# Patient Record
Sex: FEMALE | Race: WHITE | NOT HISPANIC OR LATINO | Employment: FULL TIME | URBAN - METROPOLITAN AREA
[De-identification: names, ages, dates, MRNs, and addresses within clinical notes are randomized per-mention and may not be internally consistent; named-entity substitution may affect disease eponyms.]

---

## 2018-07-11 ENCOUNTER — NON-PROVIDER VISIT (OUTPATIENT)
Dept: OCCUPATIONAL MEDICINE | Facility: CLINIC | Age: 43
End: 2018-07-11

## 2018-07-12 ENCOUNTER — NON-PROVIDER VISIT (OUTPATIENT)
Dept: OCCUPATIONAL MEDICINE | Facility: CLINIC | Age: 43
End: 2018-07-12

## 2018-07-12 DIAGNOSIS — Z02.1 DRUG SCREENING, PRE-EMPLOYMENT: ICD-10-CM

## 2018-07-12 PROCEDURE — 8907 PR URINE COLLECT ONLY: Performed by: PREVENTIVE MEDICINE

## 2018-11-01 ENCOUNTER — OFFICE VISIT (OUTPATIENT)
Dept: OTHER | Facility: IMAGING CENTER | Age: 43
End: 2018-11-01
Payer: COMMERCIAL

## 2018-11-01 VITALS
BODY MASS INDEX: 26.98 KG/M2 | RESPIRATION RATE: 14 BRPM | WEIGHT: 178 LBS | HEART RATE: 85 BPM | OXYGEN SATURATION: 100 % | HEIGHT: 68 IN | SYSTOLIC BLOOD PRESSURE: 110 MMHG | TEMPERATURE: 98.2 F | DIASTOLIC BLOOD PRESSURE: 70 MMHG

## 2018-11-01 DIAGNOSIS — F41.9 ANXIETY: ICD-10-CM

## 2018-11-01 DIAGNOSIS — K44.9 HIATAL HERNIA WITH GERD: ICD-10-CM

## 2018-11-01 DIAGNOSIS — K21.9 HIATAL HERNIA WITH GERD: ICD-10-CM

## 2018-11-01 DIAGNOSIS — Z00.00 PREVENTATIVE HEALTH CARE: ICD-10-CM

## 2018-11-01 DIAGNOSIS — Z23 NEEDS FLU SHOT: ICD-10-CM

## 2018-11-01 DIAGNOSIS — F17.200: ICD-10-CM

## 2018-11-01 PROCEDURE — 90686 IIV4 VACC NO PRSV 0.5 ML IM: CPT | Performed by: FAMILY MEDICINE

## 2018-11-01 PROCEDURE — 99204 OFFICE O/P NEW MOD 45 MIN: CPT | Performed by: FAMILY MEDICINE

## 2018-11-01 PROCEDURE — 90471 IMMUNIZATION ADMIN: CPT | Performed by: FAMILY MEDICINE

## 2018-11-01 RX ORDER — OMEPRAZOLE 20 MG/1
20 CAPSULE, DELAYED RELEASE ORAL DAILY
Status: ON HOLD | COMMUNITY
End: 2019-01-20

## 2018-11-01 ASSESSMENT — PATIENT HEALTH QUESTIONNAIRE - PHQ9: CLINICAL INTERPRETATION OF PHQ2 SCORE: 0

## 2018-11-01 NOTE — LETTER
Anonymess  Terrell Sanders D.O.  [address]  [place] [address]  Fax: [phone]   Authorization for Release/Disclosure of   Protected Health Information   Name: DAKSHA SENIOR : 1975 SSN: [id number]   Address: [address] [place] [address] [place] [address] Phone:    [phone] (home)    I authorize the entity listed below to release/disclose the PHI below to:   [facility]/Terrell Sanders D.O. and Terrell Sanders D.O.   Provider or Entity Name:     Address   City, State, Zip   Phone:      Fax:     Reason for request: continuity of care   Information to be released:    [  ] LAST COLONOSCOPY,  including any PATH REPORT and follow-up  [  ] LAST FIT/COLOGUARD RESULT [  ] LAST DEXA  [  ] LAST MAMMOGRAM  [  ] LAST PAP  [  ] LAST LABS [  ] RETINA EXAM REPORT  [  ] IMMUNIZATION RECORDS  [  ] Release all info      [  ] Check here and initial the line next to each item to release ALL health information INCLUDING  _____ Care and treatment for drug and / or alcohol abuse  _____ HIV testing, infection status, or AIDS  _____ Genetic Testing    DATES OF SERVICE OR TIME PERIOD TO BE DISCLOSED: _____________  I understand and acknowledge that:  * This Authorization may be revoked at any time by you in writing, except if your health information has already been used or disclosed.  * Your health information that will be used or disclosed as a result of you signing this authorization could be re-disclosed by the recipient. If this occurs, your re-disclosed health information may no longer be protected by State or Federal laws.  * You may refuse to sign this Authorization. Your refusal will not affect your ability to obtain treatment.  * This Authorization becomes effective upon signing and will  on (date) __________.      If no date is indicated, this Authorization will  one (1) year from the signature date.    Name: Daksha Senior    Signature:   Date:     2018       PLEASE FAX REQUESTED  RECORDS BACK TO: [phone]

## 2018-11-01 NOTE — PROGRESS NOTES
Chief Complaint   Patient presents with   • Gastrophageal Reflux     Daksha Senior is a 43 y.o. female who usually sees out of the area PCP presents today to establish care at [facility] and to discuss GERD.    HPI:  Anxiety  Increased her smoking with her increase in work stress and although she does not feel the panic level of her anxiety.  She moved to the area with the new job 6 months ago but has been very stressed with her new environment in the first 3 months.  Patient denies hyperventilating, no dyspnea, nor reduced work functionality during the peak anxiety.    Hiatal hernia with GERD  Patient had chronic issues with hiatal hernia but has not had feeling of reflux until her status post gastric sleeve 5 years ago.  She has been placed on omeprazole since then and has been fairly consistent except maybe for 1 day out of the week.  And since that patient would have the whole day of needing juan for about 4-5 pills in the 24 hours.  Patient described a sensation being burning in the epigastric area without pain or significant dyspnea.  Prior to moving team to the area patient had regular checkup forward the gastric sleeve and the EGD done in the last 5-month.    Preventative health care  Patient had family history of colon cancer and had an ovary colonoscopy done at age 40 with no reports of polyp during that time.    Depression Screening    Little interest or pleasure in doing things?  0 - not at all  Feeling down, depressed , or hopeless? 0 - not at all  Patient Health Questionnaire Score: 0    If depressive symptoms identified deferred to follow up visit unless specifically addressed in assesment and plan.      Interpretation of PHQ-9 Total Score   Score Severity   1-4 Minimal Depression   5-9 Mild Depression   10-14 Moderate Depression   15-19 Moderately Severe Depression   20-27 Severe Depression       Current medicines (including changes today)  Current Outpatient Prescriptions   Medication Sig Dispense  Refill   • B Complex Vitamins (VITAMIN-B COMPLEX PO) Take  by mouth.     • Multiple Vitamins-Minerals (MULTIVITAMIN ADULTS PO) Take  by mouth.     • omeprazole (PRILOSEC) 20 MG delayed-release capsule Take 20 mg by mouth every day.       No current facility-administered medications for this visit.      She  has no past medical history on file.  She  has no past surgical history on file.  Social History   Substance Use Topics   • Smoking status: Light Tobacco Smoker     Types: Cigarettes   • Smokeless tobacco: Never Used      Comment: socially   • Alcohol use 2.4 - 4.8 oz/week     2 - 4 Glasses of wine, 2 - 4 Cans of beer per week     Family History   Problem Relation Age of Onset   • Cancer Father      Family Status   Relation Status   • Fa (Not Specified)     Social History     Social History Narrative   • No narrative on file     ROS  Constitutional: Negative for fever, chills, weight loss and malaise/fatigue.   HENT: Negative for ear pain, nosebleeds, congestion, sore throat and neck pain.    Eyes: Negative for blurred vision.   Respiratory: Negative for cough, sputum production, shortness of breath and wheezing.    Cardiovascular: Negative for chest pain, palpitations, orthopnea and leg swelling.   Gastrointestinal: Negative for nausea, vomiting and abdominal pain. Positive for heartburn  Genitourinary: Negative for dysuria, urgency and frequency.   Musculoskeletal: Negative for myalgias, back pain and joint pain.   Skin: Negative for rash and itching.   Neurological: Negative for dizziness, tingling, tremors, sensory change, focal weakness and headaches.   Endo/Heme/Allergies: Does not bruise/bleed easily.   Psychiatric/Behavioral: Negative for depression, or memory loss.   Positive for anxiety  All other systems reviewed and are negative except as in HPI.    Labs reviewed with patient:  None reviewed, last labs done 3 years ago with normal results per patient.     Objective:   Blood pressure 110/70, pulse 85,  "temperature 36.8 °C (98.2 °F), resp. rate 14, height 1.727 m (5' 8\"), weight 80.7 kg (178 lb), SpO2 100 %. Body mass index is 27.06 kg/m².  Physical Exam:  Constitutional: Alert, no distress.  Skin: Warm, dry, good turgor, no rashes in visible areas.  Eye: Equal, round and reactive, conjunctiva clear, lids normal.  ENMT: Lips without lesions, good dentition, oropharynx clear.  Neck: Trachea midline, no masses, no thyromegaly.  Respiratory: Unlabored respiratory effort, lungs clear to auscultation, no wheezes, no ronchi.  Cardiovascular: Normal S1, S2, no murmur, no edema.  Abdomen: Soft, non-tender, no masses, no hepatosplenomegaly.  Psych: Alert and oriented x3, normal affect and mood.    Assessment and Plan:   The following treatment plan was discussed  1. Hiatal hernia with GERD  CBC WITH DIFFERENTIAL    COMP METABOLIC PANEL    TSH WITH REFLEX TO FT4    LIPID PROFILE    Continue omeprazole 20 mg daily, possible benefit from including slippery elm 400 mg twice a day with meal away from other medicatiopns intake within two hours.  Also discussed the benefit of using anti-inflammatory diet.  We will have laboratory monitoring upon the follow-up visit.   2. Anxiety      Improved in the last 3 months but still significant in that her craving for soical connection and social smoking can increase.  Discussed the possible benefit in private session acupuncture to reduce the smoking dependence and anxiety level.   3. Needs flu shot  Flu Quad Inj >3 Year Pre-Filled PF    Risks and benefit of flu vaccination discussed, patient's work place required for the flu shot evidence.  Discussed with patient to have vaccination performed today with andrographis 300 mg twice daily for the next 2 days.   4. Tobacco use disorder, mild, in controlled environment      Discussed of the barriers in smoking cessation, frequent smoke in the past and has increased in the last 3-month with the social situation of the smoking.  Encourage " patients efforts in smoking cessation in the consultation for smoking cessation over 15 minutes.   5. Preventative health care      Discussed the possble benefit in using additional garlic in cooking but combined with apple to reduce the reflux inducing potential.     Records requested.  Followup: Return in about 3 months (around 2/1/2019).    Spent 65 minutes in face-to-tace patient contact in which greater than 50% of the visit was spent in counseling and coordination of care including Anxiety management options, Answering patient questions about Smoking cessation, Concerns and potential risks related to Tobacco usage, Discussing in depth the importance of primary prevention of Hepatic failure, Discussing the nature of Tobacco cessation and Patient is also educated about lifestyle modifications which may improve Anxiety, gastroesophageal reflux disease, tobacco use disorder  We also reviewed PMH, family history, and each of her chronic diagnoses in regards to current management, specialty physicians, symptom control, and future planning.  Please refer to assessment and plan/discussion/recommendations for additional details.        I have worked with consultants from the vendor as well as technical experts from [employer] to optimize the interface. I have made every reasonable attempt to correct obvious errors, but I expect that there are errors of grammar and possibly content that I did not discover before finalizing the note.

## 2018-11-01 NOTE — ASSESSMENT & PLAN NOTE
Patient had chronic issues with hiatal hernia but has not had feeling of reflux until her status post gastric sleeve 5 years ago.  She has been placed on omeprazole since then and has been fairly consistent except maybe for 1 day out of the week.  And since that patient would have the whole day of needing juan for about 4-5 pills in the 24 hours.  Patient described a sensation being burning in the epigastric area without pain or significant dyspnea.  Prior to moving team to the area patient had regular checkup forward the gastric sleeve and the EGD done in the last 5-month.

## 2018-11-01 NOTE — ASSESSMENT & PLAN NOTE
Patient had family history of colon cancer and had an ovary colonoscopy done at age 40 with no reports of polyp during that time.

## 2018-11-01 NOTE — ASSESSMENT & PLAN NOTE
Increased her smoking with her increase in work stress and although she does not feel the panic level of her anxiety.  She moved to the area with the new job 6 months ago but has been very stressed with her new environment in the first 3 months.  Patient denies hyperventilating, no dyspnea, nor reduced work functionality during the peak anxiety.

## 2019-01-19 ENCOUNTER — HOSPITAL ENCOUNTER (OUTPATIENT)
Facility: MEDICAL CENTER | Age: 44
End: 2019-01-20
Attending: EMERGENCY MEDICINE | Admitting: HOSPITALIST
Payer: COMMERCIAL

## 2019-01-19 ENCOUNTER — APPOINTMENT (OUTPATIENT)
Dept: RADIOLOGY | Facility: MEDICAL CENTER | Age: 44
End: 2019-01-19
Payer: COMMERCIAL

## 2019-01-19 DIAGNOSIS — R07.9 ACUTE CHEST PAIN: ICD-10-CM

## 2019-01-19 LAB
ALBUMIN SERPL BCP-MCNC: 4.1 G/DL (ref 3.2–4.9)
ALBUMIN/GLOB SERPL: 1.6 G/DL
ALP SERPL-CCNC: 33 U/L (ref 30–99)
ALT SERPL-CCNC: 16 U/L (ref 2–50)
ANION GAP SERPL CALC-SCNC: 8 MMOL/L (ref 0–11.9)
AST SERPL-CCNC: 16 U/L (ref 12–45)
BASOPHILS # BLD AUTO: 0.5 % (ref 0–1.8)
BASOPHILS # BLD: 0.06 K/UL (ref 0–0.12)
BILIRUB SERPL-MCNC: 0.4 MG/DL (ref 0.1–1.5)
BUN SERPL-MCNC: 19 MG/DL (ref 8–22)
CALCIUM SERPL-MCNC: 9.4 MG/DL (ref 8.5–10.5)
CHLORIDE SERPL-SCNC: 107 MMOL/L (ref 96–112)
CO2 SERPL-SCNC: 24 MMOL/L (ref 20–33)
CREAT SERPL-MCNC: 0.81 MG/DL (ref 0.5–1.4)
EKG IMPRESSION: NORMAL
EOSINOPHIL # BLD AUTO: 0.18 K/UL (ref 0–0.51)
EOSINOPHIL NFR BLD: 1.4 % (ref 0–6.9)
ERYTHROCYTE [DISTWIDTH] IN BLOOD BY AUTOMATED COUNT: 42.9 FL (ref 35.9–50)
GLOBULIN SER CALC-MCNC: 2.6 G/DL (ref 1.9–3.5)
GLUCOSE SERPL-MCNC: 120 MG/DL (ref 65–99)
HCT VFR BLD AUTO: 35.6 % (ref 37–47)
HGB BLD-MCNC: 11.7 G/DL (ref 12–16)
IMM GRANULOCYTES # BLD AUTO: 0.03 K/UL (ref 0–0.11)
IMM GRANULOCYTES NFR BLD AUTO: 0.2 % (ref 0–0.9)
LIPASE SERPL-CCNC: 37 U/L (ref 11–82)
LYMPHOCYTES # BLD AUTO: 3.06 K/UL (ref 1–4.8)
LYMPHOCYTES NFR BLD: 24.4 % (ref 22–41)
MCH RBC QN AUTO: 28.6 PG (ref 27–33)
MCHC RBC AUTO-ENTMCNC: 32.9 G/DL (ref 33.6–35)
MCV RBC AUTO: 87 FL (ref 81.4–97.8)
MONOCYTES # BLD AUTO: 1.02 K/UL (ref 0–0.85)
MONOCYTES NFR BLD AUTO: 8.1 % (ref 0–13.4)
NEUTROPHILS # BLD AUTO: 8.19 K/UL (ref 2–7.15)
NEUTROPHILS NFR BLD: 65.4 % (ref 44–72)
NRBC # BLD AUTO: 0 K/UL
NRBC BLD-RTO: 0 /100 WBC
PLATELET # BLD AUTO: 310 K/UL (ref 164–446)
PMV BLD AUTO: 10.6 FL (ref 9–12.9)
POTASSIUM SERPL-SCNC: 3.8 MMOL/L (ref 3.6–5.5)
PROT SERPL-MCNC: 6.7 G/DL (ref 6–8.2)
RBC # BLD AUTO: 4.09 M/UL (ref 4.2–5.4)
SODIUM SERPL-SCNC: 139 MMOL/L (ref 135–145)
TROPONIN I SERPL-MCNC: <0.01 NG/ML (ref 0–0.04)
WBC # BLD AUTO: 12.5 K/UL (ref 4.8–10.8)

## 2019-01-19 PROCEDURE — 83540 ASSAY OF IRON: CPT

## 2019-01-19 PROCEDURE — 84484 ASSAY OF TROPONIN QUANT: CPT

## 2019-01-19 PROCEDURE — 80053 COMPREHEN METABOLIC PANEL: CPT

## 2019-01-19 PROCEDURE — 84443 ASSAY THYROID STIM HORMONE: CPT

## 2019-01-19 PROCEDURE — 83036 HEMOGLOBIN GLYCOSYLATED A1C: CPT

## 2019-01-19 PROCEDURE — 82728 ASSAY OF FERRITIN: CPT

## 2019-01-19 PROCEDURE — 83550 IRON BINDING TEST: CPT

## 2019-01-19 PROCEDURE — 85025 COMPLETE CBC W/AUTO DIFF WBC: CPT

## 2019-01-19 PROCEDURE — 93005 ELECTROCARDIOGRAM TRACING: CPT | Performed by: EMERGENCY MEDICINE

## 2019-01-19 PROCEDURE — 83690 ASSAY OF LIPASE: CPT

## 2019-01-19 PROCEDURE — 85652 RBC SED RATE AUTOMATED: CPT

## 2019-01-19 PROCEDURE — 85379 FIBRIN DEGRADATION QUANT: CPT

## 2019-01-19 PROCEDURE — 84703 CHORIONIC GONADOTROPIN ASSAY: CPT

## 2019-01-19 PROCEDURE — 71045 X-RAY EXAM CHEST 1 VIEW: CPT

## 2019-01-19 PROCEDURE — 36415 COLL VENOUS BLD VENIPUNCTURE: CPT

## 2019-01-19 PROCEDURE — 86140 C-REACTIVE PROTEIN: CPT

## 2019-01-19 PROCEDURE — 99285 EMERGENCY DEPT VISIT HI MDM: CPT

## 2019-01-19 PROCEDURE — 93005 ELECTROCARDIOGRAM TRACING: CPT

## 2019-01-19 RX ORDER — ASPIRIN 325 MG
325 TABLET ORAL ONCE
Status: COMPLETED | OUTPATIENT
Start: 2019-01-19 | End: 2019-01-20

## 2019-01-19 ASSESSMENT — PAIN SCALES - GENERAL: PAINLEVEL_OUTOF10: 3

## 2019-01-20 ENCOUNTER — APPOINTMENT (OUTPATIENT)
Dept: RADIOLOGY | Facility: MEDICAL CENTER | Age: 44
End: 2019-01-20
Attending: HOSPITALIST
Payer: COMMERCIAL

## 2019-01-20 ENCOUNTER — PATIENT OUTREACH (OUTPATIENT)
Dept: HEALTH INFORMATION MANAGEMENT | Facility: OTHER | Age: 44
End: 2019-01-20

## 2019-01-20 ENCOUNTER — APPOINTMENT (OUTPATIENT)
Dept: CARDIOLOGY | Facility: MEDICAL CENTER | Age: 44
End: 2019-01-20
Attending: HOSPITALIST
Payer: COMMERCIAL

## 2019-01-20 VITALS
OXYGEN SATURATION: 94 % | BODY MASS INDEX: 26.93 KG/M2 | WEIGHT: 177.69 LBS | DIASTOLIC BLOOD PRESSURE: 61 MMHG | HEART RATE: 85 BPM | RESPIRATION RATE: 17 BRPM | TEMPERATURE: 99.5 F | SYSTOLIC BLOOD PRESSURE: 134 MMHG | HEIGHT: 68 IN

## 2019-01-20 PROBLEM — D64.9 ANEMIA: Status: ACTIVE | Noted: 2019-01-20

## 2019-01-20 PROBLEM — D50.9 IRON DEFICIENCY ANEMIA: Status: ACTIVE | Noted: 2019-01-20

## 2019-01-20 PROBLEM — R07.81 PLEURITIC CHEST PAIN: Status: RESOLVED | Noted: 2019-01-20 | Resolved: 2019-01-20

## 2019-01-20 PROBLEM — R07.81 PLEURITIC CHEST PAIN: Status: ACTIVE | Noted: 2019-01-20

## 2019-01-20 LAB
CRP SERPL HS-MCNC: 0.32 MG/DL (ref 0–0.75)
D DIMER PPP IA.FEU-MCNC: <0.4 UG/ML (FEU) (ref 0–0.5)
EKG IMPRESSION: NORMAL
ERYTHROCYTE [SEDIMENTATION RATE] IN BLOOD BY WESTERGREN METHOD: 7 MM/HOUR (ref 0–20)
EST. AVERAGE GLUCOSE BLD GHB EST-MCNC: 114 MG/DL
FERRITIN SERPL-MCNC: 7.5 NG/ML (ref 10–291)
HBA1C MFR BLD: 5.6 % (ref 0–5.6)
HCG SERPL QL: NEGATIVE
IRON SATN MFR SERPL: 10 % (ref 15–55)
IRON SERPL-MCNC: 44 UG/DL (ref 40–170)
LV EJECT FRACT  99904: 65
LV EJECT FRACT MOD 2C 99903: 67.09
LV EJECT FRACT MOD 4C 99902: 60.34
LV EJECT FRACT MOD BP 99901: 63.46
TIBC SERPL-MCNC: 427 UG/DL (ref 250–450)
TROPONIN I SERPL-MCNC: <0.01 NG/ML (ref 0–0.04)
TROPONIN I SERPL-MCNC: <0.01 NG/ML (ref 0–0.04)
TSH SERPL DL<=0.005 MIU/L-ACNC: 1.82 UIU/ML (ref 0.38–5.33)

## 2019-01-20 PROCEDURE — 700102 HCHG RX REV CODE 250 W/ 637 OVERRIDE(OP): Performed by: HOSPITALIST

## 2019-01-20 PROCEDURE — A9270 NON-COVERED ITEM OR SERVICE: HCPCS | Performed by: HOSPITALIST

## 2019-01-20 PROCEDURE — 93010 ELECTROCARDIOGRAM REPORT: CPT | Performed by: INTERNAL MEDICINE

## 2019-01-20 PROCEDURE — G0378 HOSPITAL OBSERVATION PER HR: HCPCS

## 2019-01-20 PROCEDURE — 93005 ELECTROCARDIOGRAM TRACING: CPT | Performed by: HOSPITALIST

## 2019-01-20 PROCEDURE — A9502 TC99M TETROFOSMIN: HCPCS

## 2019-01-20 PROCEDURE — 93306 TTE W/DOPPLER COMPLETE: CPT

## 2019-01-20 PROCEDURE — 99236 HOSP IP/OBS SAME DATE HI 85: CPT | Performed by: HOSPITALIST

## 2019-01-20 PROCEDURE — 700111 HCHG RX REV CODE 636 W/ 250 OVERRIDE (IP)

## 2019-01-20 PROCEDURE — 84484 ASSAY OF TROPONIN QUANT: CPT

## 2019-01-20 PROCEDURE — 96372 THER/PROPH/DIAG INJ SC/IM: CPT

## 2019-01-20 PROCEDURE — A9270 NON-COVERED ITEM OR SERVICE: HCPCS | Performed by: STUDENT IN AN ORGANIZED HEALTH CARE EDUCATION/TRAINING PROGRAM

## 2019-01-20 PROCEDURE — 90471 IMMUNIZATION ADMIN: CPT

## 2019-01-20 PROCEDURE — 90732 PPSV23 VACC 2 YRS+ SUBQ/IM: CPT | Performed by: HOSPITALIST

## 2019-01-20 PROCEDURE — 700102 HCHG RX REV CODE 250 W/ 637 OVERRIDE(OP): Performed by: STUDENT IN AN ORGANIZED HEALTH CARE EDUCATION/TRAINING PROGRAM

## 2019-01-20 PROCEDURE — 700111 HCHG RX REV CODE 636 W/ 250 OVERRIDE (IP): Performed by: HOSPITALIST

## 2019-01-20 PROCEDURE — 36415 COLL VENOUS BLD VENIPUNCTURE: CPT

## 2019-01-20 PROCEDURE — 93306 TTE W/DOPPLER COMPLETE: CPT | Mod: 26 | Performed by: INTERNAL MEDICINE

## 2019-01-20 RX ORDER — ONDANSETRON 4 MG/1
4 TABLET, ORALLY DISINTEGRATING ORAL EVERY 4 HOURS PRN
Status: DISCONTINUED | OUTPATIENT
Start: 2019-01-20 | End: 2019-01-20 | Stop reason: HOSPADM

## 2019-01-20 RX ORDER — HYDROMORPHONE HYDROCHLORIDE 1 MG/ML
0.5 INJECTION, SOLUTION INTRAMUSCULAR; INTRAVENOUS; SUBCUTANEOUS
Status: DISCONTINUED | OUTPATIENT
Start: 2019-01-20 | End: 2019-01-20 | Stop reason: HOSPADM

## 2019-01-20 RX ORDER — OMEPRAZOLE 20 MG/1
20 CAPSULE, DELAYED RELEASE ORAL DAILY
Status: DISCONTINUED | OUTPATIENT
Start: 2019-01-20 | End: 2019-01-20 | Stop reason: HOSPADM

## 2019-01-20 RX ORDER — ONDANSETRON 2 MG/ML
4 INJECTION INTRAMUSCULAR; INTRAVENOUS EVERY 4 HOURS PRN
Status: DISCONTINUED | OUTPATIENT
Start: 2019-01-20 | End: 2019-01-20 | Stop reason: HOSPADM

## 2019-01-20 RX ORDER — ASPIRIN 325 MG
325 TABLET ORAL DAILY
Status: DISCONTINUED | OUTPATIENT
Start: 2019-01-20 | End: 2019-01-20 | Stop reason: HOSPADM

## 2019-01-20 RX ORDER — SUCRALFATE ORAL 1 G/10ML
1 SUSPENSION ORAL 4 TIMES DAILY
Qty: 560 ML | Refills: 0 | Status: SHIPPED | OUTPATIENT
Start: 2019-01-20 | End: 2019-02-22

## 2019-01-20 RX ORDER — OXYCODONE HYDROCHLORIDE 10 MG/1
10 TABLET ORAL
Status: DISCONTINUED | OUTPATIENT
Start: 2019-01-20 | End: 2019-01-20 | Stop reason: HOSPADM

## 2019-01-20 RX ORDER — PROMETHAZINE HYDROCHLORIDE 25 MG/1
12.5-25 SUPPOSITORY RECTAL EVERY 4 HOURS PRN
Status: DISCONTINUED | OUTPATIENT
Start: 2019-01-20 | End: 2019-01-20 | Stop reason: HOSPADM

## 2019-01-20 RX ORDER — AMOXICILLIN 250 MG
2 CAPSULE ORAL 2 TIMES DAILY
Status: DISCONTINUED | OUTPATIENT
Start: 2019-01-20 | End: 2019-01-20 | Stop reason: HOSPADM

## 2019-01-20 RX ORDER — BISACODYL 10 MG
10 SUPPOSITORY, RECTAL RECTAL
Status: DISCONTINUED | OUTPATIENT
Start: 2019-01-20 | End: 2019-01-20 | Stop reason: HOSPADM

## 2019-01-20 RX ORDER — OXYCODONE HYDROCHLORIDE 5 MG/1
5 TABLET ORAL
Status: DISCONTINUED | OUTPATIENT
Start: 2019-01-20 | End: 2019-01-20 | Stop reason: HOSPADM

## 2019-01-20 RX ORDER — HEPARIN SODIUM 5000 [USP'U]/ML
5000 INJECTION, SOLUTION INTRAVENOUS; SUBCUTANEOUS EVERY 8 HOURS
Status: DISCONTINUED | OUTPATIENT
Start: 2019-01-20 | End: 2019-01-20 | Stop reason: HOSPADM

## 2019-01-20 RX ORDER — LANOLIN ALCOHOL/MO/W.PET/CERES
325 CREAM (GRAM) TOPICAL 2 TIMES DAILY WITH MEALS
Qty: 60 TAB | Refills: 3 | Status: SHIPPED | OUTPATIENT
Start: 2019-01-20 | End: 2019-03-19 | Stop reason: CLARIF

## 2019-01-20 RX ORDER — POLYETHYLENE GLYCOL 3350 17 G/17G
1 POWDER, FOR SOLUTION ORAL
Status: DISCONTINUED | OUTPATIENT
Start: 2019-01-20 | End: 2019-01-20 | Stop reason: HOSPADM

## 2019-01-20 RX ORDER — REGADENOSON 0.08 MG/ML
INJECTION, SOLUTION INTRAVENOUS
Status: COMPLETED
Start: 2019-01-20 | End: 2019-01-20

## 2019-01-20 RX ORDER — ASPIRIN 300 MG/1
300 SUPPOSITORY RECTAL DAILY
Status: DISCONTINUED | OUTPATIENT
Start: 2019-01-20 | End: 2019-01-20 | Stop reason: HOSPADM

## 2019-01-20 RX ORDER — ASPIRIN 81 MG/1
324 TABLET, CHEWABLE ORAL DAILY
Status: DISCONTINUED | OUTPATIENT
Start: 2019-01-20 | End: 2019-01-20 | Stop reason: HOSPADM

## 2019-01-20 RX ORDER — PROMETHAZINE HYDROCHLORIDE 25 MG/1
12.5-25 TABLET ORAL EVERY 4 HOURS PRN
Status: DISCONTINUED | OUTPATIENT
Start: 2019-01-20 | End: 2019-01-20 | Stop reason: HOSPADM

## 2019-01-20 RX ORDER — OMEPRAZOLE 20 MG/1
20 CAPSULE, DELAYED RELEASE ORAL 2 TIMES DAILY
Qty: 60 CAP | Refills: 1 | Status: SHIPPED | OUTPATIENT
Start: 2019-01-20 | End: 2019-03-19 | Stop reason: CLARIF

## 2019-01-20 RX ADMIN — HEPARIN SODIUM 5000 UNITS: 5000 INJECTION, SOLUTION INTRAVENOUS; SUBCUTANEOUS at 02:22

## 2019-01-20 RX ADMIN — LIDOCAINE HYDROCHLORIDE 15 ML: 20 SOLUTION OROPHARYNGEAL at 01:57

## 2019-01-20 RX ADMIN — ASPIRIN 325 MG: 325 TABLET, FILM COATED ORAL at 00:05

## 2019-01-20 RX ADMIN — ASPIRIN 325 MG: 325 TABLET ORAL at 05:32

## 2019-01-20 RX ADMIN — PNEUMOCOCCAL VACCINE POLYVALENT 25 MCG
25; 25; 25; 25; 25; 25; 25; 25; 25; 25; 25; 25; 25; 25; 25; 25; 25; 25; 25; 25; 25; 25; 25 INJECTION, SOLUTION INTRAMUSCULAR; SUBCUTANEOUS at 12:23

## 2019-01-20 RX ADMIN — REGADENOSON 0.4 MG: 0.08 INJECTION, SOLUTION INTRAVENOUS at 10:09

## 2019-01-20 RX ADMIN — OMEPRAZOLE 20 MG: 20 CAPSULE, DELAYED RELEASE ORAL at 05:32

## 2019-01-20 ASSESSMENT — ENCOUNTER SYMPTOMS
NAUSEA: 0
BRUISES/BLEEDS EASILY: 0
DOUBLE VISION: 0
HALLUCINATIONS: 0
WEAKNESS: 0
PALPITATIONS: 0
DEPRESSION: 0
DIZZINESS: 0
SENSORY CHANGE: 0
HEMOPTYSIS: 0
EYE DISCHARGE: 0
FEVER: 0
DIAPHORESIS: 1
CHILLS: 0
ABDOMINAL PAIN: 0
HEARTBURN: 0
FOCAL WEAKNESS: 0
VOMITING: 0
COUGH: 0
FLANK PAIN: 0
SPEECH CHANGE: 0
SHORTNESS OF BREATH: 1
BLURRED VISION: 0
MYALGIAS: 0

## 2019-01-20 ASSESSMENT — PATIENT HEALTH QUESTIONNAIRE - PHQ9
SUM OF ALL RESPONSES TO PHQ9 QUESTIONS 1 AND 2: 0
2. FEELING DOWN, DEPRESSED, IRRITABLE, OR HOPELESS: NOT AT ALL
1. LITTLE INTEREST OR PLEASURE IN DOING THINGS: NOT AT ALL

## 2019-01-20 ASSESSMENT — LIFESTYLE VARIABLES
SUBSTANCE_ABUSE: 0
EVER_SMOKED: YES
ALCOHOL_USE: NO

## 2019-01-20 ASSESSMENT — PAIN SCALES - GENERAL: PAINLEVEL_OUTOF10: 2

## 2019-01-20 NOTE — ASSESSMENT & PLAN NOTE
Chest pain with good story, sudden onset radiating to jaw and arm  Does have leukocytosis no recent illnesses, will check esr and echo to r/o pericarditis   Trial GI cocktail for known gerd an hiatal hernia  Serial troponin and ekg   Cardiac monitoring   D dimer  Cardiac stress also ordered

## 2019-01-20 NOTE — DISCHARGE INSTRUCTIONS
Discharge Instructions    Discharged to home by car with relative. Discharged via walking, hospital escort: Yes.  Special equipment needed: Not Applicable    Be sure to schedule a follow-up appointment with your primary care doctor or any specialists as instructed.     Discharge Plan:   Diet Plan: Discussed  Activity Level: Discussed  Smoking Cessation Offered: Patient Refused (educated)  Confirmed Follow up Appointment: Patient to Call and Schedule Appointment  Confirmed Symptoms Management: Discussed  Medication Reconciliation Updated: Yes  Influenza Vaccine Indication: Not indicated: Previously immunized this influenza season and > 8 years of age    I understand that a diet low in cholesterol, fat, and sodium is recommended for good health. Unless I have been given specific instructions below for another diet, I accept this instruction as my diet prescription.   Other diet: heart healthy    Special Instructions: None    · Is patient discharged on Warfarin / Coumadin?   No               Depression / Suicide Risk    As you are discharged from this [facility] facility, it is important to learn how to keep safe from harming yourself.    Recognize the warning signs:  · Abrupt changes in personality, positive or negative- including increase in energy   · Giving away possessions  · Change in eating patterns- significant weight changes-  positive or negative  · Change in sleeping patterns- unable to sleep or sleeping all the time   · Unwillingness or inability to communicate  · Depression  · Unusual sadness, discouragement and loneliness  · Talk of wanting to die  · Neglect of personal appearance   · Rebelliousness- reckless behavior  · Withdrawal from people/activities they love  · Confusion- inability to concentrate     If you or a loved one observes any of these behaviors or has concerns about self-harm, here's what you can do:  · Talk about it- your feelings and reasons for harming yourself  · Remove any means  that you might use to hurt yourself (examples: pills, rope, extension cords, firearm)  · Get professional help from the community (Mental Health, Substance Abuse, psychological counseling)  · Do not be alone:Call your Safe Contact- someone whom you trust who will be there for you.  · Call your local CRISIS HOTLINE 229-9823 or 929-752-6734  · Call your local Children's Mobile Crisis Response Team [place] (853) 086-9484 or www.reBounces  · Call the toll free National Suicide Prevention Hotlines   · National Suicide Prevention Lifeline 352-751-WZFY (0352)  · National Hope Line Network 800-SUICIDE (124-6413)

## 2019-01-20 NOTE — ED TRIAGE NOTES
Patient to ED via [facility] from home. C/o of midsternal, crushing chest pain that radiates to bilat shoulders and both sides of her neck. Patient was sitting watching TV when pain started, states it gradually worsened over the course of several hours. Also c/o of SOB w deep inspiration and dizziness. Patient received 2 0.4mg of sublingual nitro en route which brought her pain from 8/10 to 3/10

## 2019-01-20 NOTE — DISCHARGE SUMMARY
Discharge Summary    CHIEF COMPLAINT ON ADMISSION  Chief Complaint   Patient presents with   • Chest Pain       Reason for Admission  EMS     Admission Date  1/19/2019    CODE STATUS  Full Code    HPI & HOSPITAL COURSE  This is a 43 y.o. female here with chest pain.  Please see the dictated history of present illness 1/19/2019 for complete details.  In short, patient has extensive GI history including reflux, hiatal hernia, gastric sleeve, as well as anxiety.  She developed chest pain radiating to the back, worse with lying flat and radiating to the left shoulder and jaw with shortness of breath and diaphoresis.  Initial EKG was interpreted as nonacute.  Initial troponin was negative.  She did have a mild leukocytosis at 12.5.  Mildly anemic at 11\35.  Iron saturation is low at 10, with normal iron and normal TIBC.  Chest x-ray was unremarkable.  She was therefore admitted to the observation unit for further monitoring and risk stratification.    Patient was given GI cocktail with reduction in her symptoms.  Serial troponins remain negative.  Echocardiogram is negative for evidence of heart disease or valvular problems.  D-dimer is negative.  Following morning the patient underwent nuclear medicine stress testing which was negative for reversible ischemia or wall motion abnormalities.    Due to the above, the patient will be given a prescription for Carafate, iron, and instructions to increase her PPI to twice daily.  She should follow-up with her PCP to discuss her response to this medication and obtain repeat iron studies in 1-2 months. Therefore, she is discharged in good and stable condition to home with close outpatient follow-up.    Discharge Date  1/20/2019    FOLLOW UP ITEMS POST DISCHARGE  None    DISCHARGE DIAGNOSES  Principal Problem:    Pleuritic chest pain POA: Unknown  Active Problems:    Hiatal hernia with GERD POA: Yes      Overview: S/p gastric sleeve 2013    Anxiety POA: Yes      Overview:  Resultant social smoking    Tobacco use disorder, mild, in controlled environment POA: Yes      Overview: Does not use in front of her kids, nor does she use at night to reduce       cough.    Anemia POA: Unknown  Resolved Problems:    * No resolved hospital problems. *      FOLLOW UP  No future appointments.  No follow-up provider specified.    MEDICATIONS ON DISCHARGE     Medication List      START taking these medications      Instructions   ferrous sulfate 325 (65 Fe) MG EC tablet   Take 1 Tab by mouth 2 times a day, with meals.  Dose:  325 mg     sucralfate 1 GM/10ML Susp  Commonly known as:  CARAFATE   Take 10 mL by mouth 4 times a day.  Dose:  1 g        CHANGE how you take these medications      Instructions   omeprazole 20 MG delayed-release capsule  What changed:  when to take this  Commonly known as:  PRILOSEC   Take 1 Cap by mouth 2 times a day.  Dose:  20 mg        CONTINUE taking these medications      Instructions   MULTIVITAMIN ADULTS PO   Take  by mouth.     VITAMIN-B COMPLEX PO   Take  by mouth.            Allergies  No Known Allergies    DIET  Orders Placed This Encounter   Procedures   • Diet Order Cardiac     Standing Status:   Standing     Number of Occurrences:   1     Order Specific Question:   Diet:     Answer:   Cardiac [6]       ACTIVITY  As tolerated.  Weight bearing as tolerated    CONSULTATIONS  None    PROCEDURES  None    LABORATORY  Lab Results   Component Value Date    SODIUM 139 01/19/2019    POTASSIUM 3.8 01/19/2019    CHLORIDE 107 01/19/2019    CO2 24 01/19/2019    GLUCOSE 120 (H) 01/19/2019    BUN 19 01/19/2019    CREATININE 0.81 01/19/2019        Lab Results   Component Value Date    WBC 12.5 (H) 01/19/2019    HEMOGLOBIN 11.7 (L) 01/19/2019    HEMATOCRIT 35.6 (L) 01/19/2019    PLATELETCT 310 01/19/2019        Total time of the discharge process exceeds 36 minutes.

## 2019-01-20 NOTE — CARE PLAN
Problem: Knowledge Deficit  Goal: Knowledge of the prescribed therapeutic regimen will improve  Outcome: PROGRESSING AS EXPECTED  Pt aware for stress test

## 2019-01-20 NOTE — H&P
Hospital Medicine History & Physical Note    Date of Service  1/20/2019    Primary Care Physician  Terrell Sanders D.O.    Consultants  none    Code Status  full    Chief Complaint  Chest pain    History of Presenting Illness  43 y.o. female who presented 1/19/2019 with past medical history of acid reflux, hiatal hernia, gastric sleeve and anxiety who presents with acute onset chest pain.  Patient was sitting and watching TV for prolonged period of time today.  Around 8 PM she developed acute onset chest pain that felt like a squeezing crushing pain.  8 out of 10 in severity continued pain in the emergency department.  This pain radiated to her back and was worse with lying flat.  Also radiated to the left shoulder and the jaw associated with shortness of breath diaphoresis.  Her pain improved with nitroglycerin and fentanyl.  She denies any recent travel or leg swelling.  She does smoke.  No known alleviating or exacerbating factors otherwise to her symptoms.    Review of Systems  Review of Systems   Constitutional: Positive for diaphoresis. Negative for chills and fever.   HENT: Negative for congestion, hearing loss and tinnitus.    Eyes: Negative for blurred vision, double vision and discharge.   Respiratory: Positive for shortness of breath. Negative for cough and hemoptysis.    Cardiovascular: Positive for chest pain. Negative for palpitations and leg swelling.   Gastrointestinal: Negative for abdominal pain, heartburn, nausea and vomiting.   Genitourinary: Negative for dysuria and flank pain.   Musculoskeletal: Negative for joint pain and myalgias.   Skin: Negative for rash.   Neurological: Negative for dizziness, sensory change, speech change, focal weakness and weakness.   Endo/Heme/Allergies: Negative for environmental allergies. Does not bruise/bleed easily.   Psychiatric/Behavioral: Negative for depression, hallucinations and substance abuse.       Past Medical History  Hiatal hernia, gerd,  anxiety    Surgical History  Gastric sleve     Family History  Reviewed and not pertinent    Social History   reports that she has been smoking Cigarettes.  She has never used smokeless tobacco. She reports that she drinks about 2.4 - 4.8 oz of alcohol per week . She reports that she does not use drugs.    Allergies  No Known Allergies    Medications  Prior to Admission Medications   Prescriptions Last Dose Informant Patient Reported? Taking?   B Complex Vitamins (VITAMIN-B COMPLEX PO) 1/19/2019 at Unknown time  Yes No   Sig: Take  by mouth.   Multiple Vitamins-Minerals (MULTIVITAMIN ADULTS PO) 1/19/2019 at Unknown time  Yes No   Sig: Take  by mouth.   omeprazole (PRILOSEC) 20 MG delayed-release capsule as needed  Yes No   Sig: Take 20 mg by mouth every day.      Facility-Administered Medications: None       Physical Exam  Temp:  [37.2 °C (99 °F)] 37.2 °C (99 °F)  Pulse:  [87] 87  Resp:  [19] 19  BP: (102)/(56) 102/56    Physical Exam   Constitutional: She is oriented to person, place, and time. She appears well-developed and well-nourished. She appears distressed.   HENT:   Head: Normocephalic and atraumatic.   Eyes: Pupils are equal, round, and reactive to light. Conjunctivae and EOM are normal.   Neck: Normal range of motion. Neck supple. No JVD present.   Cardiovascular: Normal rate, regular rhythm, normal heart sounds and intact distal pulses.    No murmur heard.  Pulmonary/Chest: Effort normal and breath sounds normal. No respiratory distress. She has no wheezes.   Abdominal: Soft. Bowel sounds are normal. She exhibits no distension. There is no tenderness.   Musculoskeletal: Normal range of motion. She exhibits no edema.   Neurological: She is alert and oriented to person, place, and time. She exhibits normal muscle tone.   Skin: Skin is warm and dry.   Psychiatric:   anxious   Nursing note and vitals reviewed.      Laboratory:  Recent Labs      01/19/19   2246   WBC  12.5*   RBC  4.09*   HEMOGLOBIN  11.7*    HEMATOCRIT  35.6*   MCV  87.0   MCH  28.6   MCHC  32.9*   RDW  42.9   PLATELETCT  310   MPV  10.6     Recent Labs      01/19/19   2246   SODIUM  139   POTASSIUM  3.8   CHLORIDE  107   CO2  24   GLUCOSE  120*   BUN  19   CREATININE  0.81   CALCIUM  9.4     Recent Labs      01/19/19 2246   ALTSGPT  16   ASTSGOT  16   ALKPHOSPHAT  33   TBILIRUBIN  0.4   LIPASE  37   GLUCOSE  120*                 Recent Labs      01/19/19 2246   TROPONINI  <0.01       Urinalysis:    No results found     Imaging:  DX-CHEST-LIMITED (1 VIEW)   Final Result      No radiographic evidence of acute cardiopulmonary process.      NM-CARDIAC STRESS TEST    (Results Pending)   EC-ECHOCARDIOGRAM COMPLETE W/O CONT    (Results Pending)         Assessment/Plan:  I anticipate this patient is appropriate for observation status at this time.    * Pleuritic chest pain   Assessment & Plan    Chest pain with good story, sudden onset radiating to jaw and arm  Does have leukocytosis no recent illnesses, will check esr and echo to r/o pericarditis   Trial GI cocktail for known gerd an hiatal hernia  Serial troponin and ekg   Cardiac monitoring   D dimer  Cardiac stress also ordered      Anemia   Assessment & Plan    Mild no evidence of bleeding   Will check iron panel      Tobacco use disorder, mild, in controlled environment- (present on admission)   Assessment & Plan    Encouraged cessation     Anxiety- (present on admission)   Assessment & Plan    Seems uncontrolled, consider SSRI therapy      Hiatal hernia with GERD- (present on admission)   Assessment & Plan    Resume home PPI   Trial on GI cocktail          VTE prophylaxis: heparin

## 2019-01-20 NOTE — PROGRESS NOTES
A/o, respirations are even and unlabored on room air ,assessment completed, vital signs stable, SR on the monitor with PVCs, pt complaining of chest pain 5/10 radiating to back,  updated communication board,  poc discussed and understood, verbalized understanding, pt aware NPO for stress test, all questions answered at this time , fall precautions in place, call button within reach, will continue to monitor

## 2019-01-20 NOTE — PROGRESS NOTES
Assessment completed. Pt A&Ox4. Respirations are even and unlabored on RA. Pt reports chest tightness, worse with inspiration and laying flat. Denies nausea. Monitors applied, VS stable, call light and belongings within reach. POC updated. Pt educated on room and call light, pt verbalized understanding. Communication board updated. Needs met. NPO for stress test.

## 2019-01-20 NOTE — PROGRESS NOTES
IV Dc 'd. Discharge instructions provided to patient. Pt verbalizes understanding. Pt states all questions have been answered. Copy of DC provided to patient. Signed copy in chart. 3 prescriptions sent to her pharmacy. Pt states all personal belongings are in possession.  Pt escorted off unit by tech via w/c without incident.

## 2019-01-20 NOTE — PROGRESS NOTES
Transported pt from ER to T207 via wheelchair , all belongings and charts with patient, hooked in the monitor-SR.

## 2019-02-21 DIAGNOSIS — K21.9 HIATAL HERNIA WITH GERD: ICD-10-CM

## 2019-02-21 DIAGNOSIS — K44.9 HIATAL HERNIA WITH GERD: ICD-10-CM

## 2019-02-22 ENCOUNTER — OFFICE VISIT (OUTPATIENT)
Dept: OTHER | Facility: IMAGING CENTER | Age: 44
End: 2019-02-22
Payer: COMMERCIAL

## 2019-02-22 VITALS
TEMPERATURE: 98.9 F | HEART RATE: 84 BPM | DIASTOLIC BLOOD PRESSURE: 70 MMHG | OXYGEN SATURATION: 99 % | WEIGHT: 173.5 LBS | SYSTOLIC BLOOD PRESSURE: 112 MMHG | RESPIRATION RATE: 14 BRPM | BODY MASS INDEX: 26.3 KG/M2 | HEIGHT: 68 IN

## 2019-02-22 DIAGNOSIS — K44.9 HIATAL HERNIA WITH GERD: ICD-10-CM

## 2019-02-22 DIAGNOSIS — D50.8 IRON DEFICIENCY ANEMIA SECONDARY TO INADEQUATE DIETARY IRON INTAKE: ICD-10-CM

## 2019-02-22 DIAGNOSIS — K21.9 HIATAL HERNIA WITH GERD: ICD-10-CM

## 2019-02-22 PROCEDURE — 99214 OFFICE O/P EST MOD 30 MIN: CPT | Performed by: FAMILY MEDICINE

## 2019-02-22 NOTE — PROGRESS NOTES
"Chief Complaint   Patient presents with   • Results     lab work    • Follow-Up     was in the ER about a month ago with chest pain    • Hiatal Hernia     Subjective:   Daksha Senior is a 43 y.o. female here today for multiple problems as listed below.      Iron deficiency anemia secondary to inadequate dietary iron intake  Her anemia has not caused any dizziness or lightheadedness and no fall.  She has not been having issue with continual iron supplementation and no gastric upset was noted.    Hiatal hernia with GERD  Patient has been on long term PPI and has not been consistent with her slippery elm since the incident from ED for the chest pressure and pain.  Patient had been found to have no cardiac issues and was informed of the possible hiatal hernia.     Current medicines (including changes today)  Current Outpatient Prescriptions   Medication Sig Dispense Refill   • omeprazole (PRILOSEC) 20 MG delayed-release capsule Take 1 Cap by mouth 2 times a day. 60 Cap 1   • ferrous sulfate 325 (65 Fe) MG EC tablet Take 1 Tab by mouth 2 times a day, with meals. 60 Tab 3   • B Complex Vitamins (VITAMIN-B COMPLEX PO) Take  by mouth.     • Multiple Vitamins-Minerals (MULTIVITAMIN ADULTS PO) Take  by mouth.       No current facility-administered medications for this visit.      She  has no past medical history on file.    ROS  No chest pain, no shortness of breath, no abdominal pain, no diarrhea/constipation, no urinary symptoms.     Objective:     Blood pressure 112/70, pulse 84, temperature 37.2 °C (98.9 °F), temperature source Temporal, resp. rate 14, height 1.727 m (5' 8\"), weight 78.7 kg (173 lb 8 oz), SpO2 99 %, not currently breastfeeding. Body mass index is 26.38 kg/m².  Physical Exam:  Alert, oriented in no acute distress.  Eye contact is good, speech goal directed, affect calm  HEENT: conjunctiva non-injected, sclera non-icteric.  Pinna normal. TM pearly gray. Oral mucous membranes pink and moist with no " lesions.  Neck: No adenopathy or masses in the neck or supraclavicular regions.  Lungs: clear to auscultation bilaterally with good excursion.  CV: regular rate and rhythm.  Abdomen: soft, nontender, No CVAT  Ext: no edema, color normal, vascularity normal, temperature normal    Assessment and Plan:   The following treatment plan was discussed  1. Hiatal hernia with GERD      Advised to use mastic gum 1000 milligrams daily for next 8 weeks or use  mg twice a day for next 6 weeks.   2. Iron deficiency anemia secondary to inadequate dietary iron intake      Discussed with patient of her CBC results as well as TSH, lipid panel, CMP.  Also will benefit from using iron rich food and vegetables to assist with the potential difficulty in absorption.     Followup: Return in about 4 weeks (around 3/22/2019).    Spent 25 minutes in face-to-tace patient contact in which greater than 50% of the visit was spent in counseling and coordination of care including hital hernia management options, Answering patient questions about iorn deficiency anemia, Concerns and potential risks related to anxiety and cardiac disease, Discussing in depth the importance of primary prevention of fracture, Discussing the nature of anemia and Patient is also educated about lifestyle modifications which may improve her GERD and hiatal hernia  We also reviewed PMH, family history, and each of her chronic diagnoses in regards to current management, specialty physicians, symptom control, and future planning. Please refer to assessment and plan/discussion/recommendations for additional details.        Please note that dictation has been dictated using voice recognition soft ware. I have made every reasonable attempt to correct obvious errors, but I expect that there are errors of grammar and possibly content that I did not discover before finalizing the note.

## 2019-02-25 NOTE — ASSESSMENT & PLAN NOTE
Her anemia has not caused any dizziness or lightheadedness and no fall.  She has not been having issue with continual iron supplementation and no gastric upset was noted.

## 2019-02-25 NOTE — ASSESSMENT & PLAN NOTE
Patient has been on long term PPI and has not been consistent with her slippery elm since the incident from ED for the chest pressure and pain.  Patient had been found to have no cardiac issues and was informed of the possible hiatal hernia.

## 2019-03-05 ENCOUNTER — OFFICE VISIT (OUTPATIENT)
Dept: OTHER | Facility: IMAGING CENTER | Age: 44
End: 2019-03-05
Payer: COMMERCIAL

## 2019-03-05 VITALS
SYSTOLIC BLOOD PRESSURE: 112 MMHG | DIASTOLIC BLOOD PRESSURE: 70 MMHG | BODY MASS INDEX: 26.22 KG/M2 | TEMPERATURE: 97.9 F | HEART RATE: 80 BPM | RESPIRATION RATE: 12 BRPM | HEIGHT: 68 IN | WEIGHT: 173 LBS | OXYGEN SATURATION: 100 %

## 2019-03-05 DIAGNOSIS — G44.221 CHRONIC TENSION-TYPE HEADACHE, INTRACTABLE: ICD-10-CM

## 2019-03-05 PROCEDURE — 99214 OFFICE O/P EST MOD 30 MIN: CPT | Performed by: FAMILY MEDICINE

## 2019-03-05 RX ORDER — IBUPROFEN 200 MG
200-600 TABLET ORAL EVERY 8 HOURS PRN
COMMUNITY

## 2019-03-05 RX ORDER — RIZATRIPTAN BENZOATE 5 MG/1
5 TABLET ORAL
Qty: 10 TAB | Refills: 3 | Status: SHIPPED | OUTPATIENT
Start: 2019-03-05 | End: 2019-03-18

## 2019-03-05 RX ORDER — DIPHENHYDRAMINE HCL 25 MG
25 TABLET ORAL EVERY 6 HOURS PRN
COMMUNITY
End: 2019-03-19 | Stop reason: CLARIF

## 2019-03-05 ASSESSMENT — PAIN SCALES - GENERAL: PAINLEVEL: 3=SLIGHT PAIN

## 2019-03-05 NOTE — PROGRESS NOTES
Chief Complaint   Patient presents with   • Headache     x 9 days, right sided, hazy vision     Subjective:   Daksha Senior is a 43 y.o. female here today for multiple problems as listed below.      Chronic tension-type headache, intractable  Patient described that she had chronic intermittent headache that is typically not like the pattern of this last 9 days.  Patient describes illness last 9 days patient had some right-sided headaches intermittently that seems to be more significant when she is hungry.  Patient described when she had some reflux sensation associated with this episode patient can take Tums which will relieve some of the symptoms though not completely.  There are also some pattern wise connection to the times that she seems to be more tired/stressed.  Patient describes some haze-like feelings of vision at the peripheral front when the headache starts to appear.  The vision disturbance seems to be fluctuating and non-sustained.  There are however not having nausea or vertigo sensation.  Patient also denies any loss of sensory or motor functions.  Patient described family had not seen any facial droop or gait disturbance.  Patient has a family history of migraine from the sister who has been taken sumatriptan.  Patient had not tried any other over-the-counter or family members prescription.       Current medicines (including changes today)  Current Outpatient Prescriptions   Medication Sig Dispense Refill   • diphenhydrAMINE (BENADRYL) 25 MG Tab Take 25 mg by mouth every 6 hours as needed for Sleep.     • ibuprofen (MOTRIN) 200 MG Tab Take 200 mg by mouth every 6 hours as needed.     • rizatriptan (MAXALT) 5 MG tablet Take 1 Tab by mouth Once PRN for Migraine for up to 1 dose. 10 Tab 3   • ferrous sulfate 325 (65 Fe) MG EC tablet Take 1 Tab by mouth 2 times a day, with meals. 60 Tab 3   • B Complex Vitamins (VITAMIN-B COMPLEX PO) Take  by mouth.     • Multiple Vitamins-Minerals (MULTIVITAMIN  "ADULTS PO) Take  by mouth.     • omeprazole (PRILOSEC) 20 MG delayed-release capsule Take 1 Cap by mouth 2 times a day. 60 Cap 1     No current facility-administered medications for this visit.      She  has no past medical history on file.    ROS  No chest pain, no shortness of breath, no abdominal pain, no diarrhea/constipation, no urinary symptoms.     Objective:     Blood pressure 112/70, pulse 80, temperature 36.6 °C (97.9 °F), resp. rate 12, height 1.727 m (5' 8\"), weight 78.5 kg (173 lb), SpO2 100 %, not currently breastfeeding. Body mass index is 26.3 kg/m².  Physical Exam:  Alert, oriented in no acute distress.  Eye contact is good, speech goal directed, affect calm  HEENT: conjunctiva non-injected, sclera non-icteric.  Pinna normal.   Neck: No adenopathy or masses in the neck or supraclavicular regions.  Lungs: clear to auscultation bilaterally with good excursion.  CV: regular rate and rhythm.  Abdomen: soft, nontender, No CVAT  Neuro: Negative Romberg, finger-nose-finger movement intact without intentional tremor, rapid alternating movement intact, heel-knee-shin and gait intact, negative for nystagmus, visual fields are full bilaterally, extraocular movements intact, negative facial asymmetry, negative tongue deviation, hearing, negative for dyarthia and aphasia, proprioception in bilateral upper and lower extremities are intact, two-point discrimination intact in facial, upper extremity, lower extremities.  Negative pronator drift , sharp, dull sensation bilaterally intact, negative for loss of frown line on the right forehead, motor strength 4/5 in bilateral upper and lower extremities.  Ext: no edema, color normal, vascularity normal, temperature normal    Assessment and Plan:   The following treatment plan was discussed  1. Chronic tension-type headache, intractable  rizatriptan (MAXALT) 5 MG tablet    MR-BRAIN-WITH & W/O    Discussed with patient of the family history of migraine, given the " correlation of patterns in her headache patient can possibly benefit from using Butterbur 50 mg tiwce daily as well as mastic gum 500 mg twice daily but with meal.  Given the lack of neurologic symptoms during our visit, patient is discussed about the warning signs of headaches that can present any neurologic disturbance, patient also will likely benefit from baseline brain imaging with anticipation of neurologic workup should headache not resolved with these measures.       Followup: Return in about 4 weeks (around 4/2/2019).    Spent 25 minutes in face-to-tace patient contact in which greater than 50% of the visit was spent in counseling and coordination of care including Headache management options, Answering patient questions about Epigastric discomfort, Concerns and potential risks related to PUD, Discussing in depth the importance of primary prevention of Gastric perforation, CVA, Discussing the nature of Dyspepsia and Patient is also educated about lifestyle modifications which may improve Headache, vision disturbance, anxiety, dyspepsia.  We also reviewed PMH, family history, and each of her chronic diagnoses in regards to current management, specialty physicians, symptom control, and future planning. Please refer to assessment and plan/discussion/recommendations for additional details.        Please note that dictation has been dictated using voice recognition soft ware. I have made every reasonable attempt to correct obvious errors, but I expect that there are errors of grammar and possibly content that I did not discover before finalizing the note.

## 2019-03-09 NOTE — ASSESSMENT & PLAN NOTE
Patient described that she had chronic intermittent headache that is typically not like the pattern of this last 9 days.  Patient describes illness last 9 days patient had some right-sided headaches intermittently that seems to be more significant when she is hungry.  Patient described when she had some reflux sensation associated with this episode patient can take Tums which will relieve some of the symptoms though not completely.  There are also some pattern wise connection to the times that she seems to be more tired/stressed.  Patient describes some haze-like feelings of vision at the peripheral front when the headache starts to appear.  The vision disturbance seems to be fluctuating and non-sustained.  There are however not having nausea or vertigo sensation.  Patient also denies any loss of sensory or motor functions.  Patient described family had not seen any facial droop or gait disturbance.  Patient has a family history of migraine from the sister who has been taken sumatriptan.  Patient had not tried any other over-the-counter or family members prescription.

## 2019-03-13 DIAGNOSIS — G44.221 CHRONIC TENSION-TYPE HEADACHE, INTRACTABLE: ICD-10-CM

## 2019-03-18 ENCOUNTER — OFFICE VISIT (OUTPATIENT)
Dept: OTHER | Facility: IMAGING CENTER | Age: 44
End: 2019-03-18
Payer: COMMERCIAL

## 2019-03-18 VITALS
DIASTOLIC BLOOD PRESSURE: 64 MMHG | WEIGHT: 172.62 LBS | TEMPERATURE: 98.8 F | HEART RATE: 85 BPM | RESPIRATION RATE: 14 BRPM | HEIGHT: 68 IN | BODY MASS INDEX: 26.16 KG/M2 | OXYGEN SATURATION: 100 % | SYSTOLIC BLOOD PRESSURE: 112 MMHG

## 2019-03-18 DIAGNOSIS — F41.9 ANXIETY: ICD-10-CM

## 2019-03-18 DIAGNOSIS — R90.82: ICD-10-CM

## 2019-03-18 DIAGNOSIS — K21.9 HIATAL HERNIA WITH GERD: ICD-10-CM

## 2019-03-18 DIAGNOSIS — K44.9 HIATAL HERNIA WITH GERD: ICD-10-CM

## 2019-03-18 DIAGNOSIS — G44.221 CHRONIC TENSION-TYPE HEADACHE, INTRACTABLE: ICD-10-CM

## 2019-03-18 DIAGNOSIS — G44.52 NEW DAILY PERSISTENT HEADACHE: ICD-10-CM

## 2019-03-18 PROCEDURE — 99214 OFFICE O/P EST MOD 30 MIN: CPT | Performed by: FAMILY MEDICINE

## 2019-03-18 RX ORDER — DEXAMETHASONE 0.5 MG/1
TABLET ORAL
Qty: 42 TAB | Refills: 0 | Status: SHIPPED | OUTPATIENT
Start: 2019-03-18 | End: 2019-03-18 | Stop reason: SDUPTHER

## 2019-03-18 RX ORDER — DEXAMETHASONE 0.5 MG/1
TABLET ORAL
Qty: 54 TAB | Refills: 0 | Status: ON HOLD
Start: 2019-03-18 | End: 2019-03-22

## 2019-03-18 NOTE — ASSESSMENT & PLAN NOTE
Patient experienced improvement with mastic gum and reports resolution of symptoms almost immediately.

## 2019-03-18 NOTE — PROGRESS NOTES
Chief Complaint   Patient presents with   • Headache     still there   • Results     MRI     Subjective:   Daksha Senior is a 43 y.o. female here today for multiple problems as listed below.      Hiatal hernia with GERD  Patient experienced improvement with mastic gum and reports resolution of symptoms almost immediately.    Anxiety  Worsened with the letter of results of her brain MRI.  In the meantime, can use Saffron 30 mg daily for the next 6 months.    New daily persistent headache  Patient's right-sided headache with vision peripheral vision has persisted in the last month despite of using butterbur and as needed triptan without any symptom modification.  Patient has not persist to have any other focal weakness or neurologic manifestation.  Patient's ophthalmologist follow-up has not revealed any retinal abnormalities patient did not have any family history of multiple sclerosis and while her sister has a history of migraines but typically was able to be managed by the triptan.     Current medicines (including changes today)  Current Outpatient Prescriptions   Medication Sig Dispense Refill   • [START ON 3/27/2019] vitamin D, Ergocalciferol, (DRISDOL) 30611 units Cap capsule Take 1 Cap by mouth every 7 days for 8 doses. 8 Cap 0   • omeprazole (PRILOSEC) 20 MG delayed-release capsule Take 1 Cap by mouth every day. 30 Cap 0   • NS SOLN 100 mL with methylPREDNISolone sod succ 1000 MG SOLR 1,000 mg 1,000 mg by Intravenous route every day for 2 days. 2000 mg 0   • ALPRAZolam (XANAX) 0.5 MG Tab Take 1 Tab by mouth 3 times a day as needed for Anxiety for up to 30 days. 30 Tab 0   • predniSONE (DELTASONE) 20 MG Tab Take 3.5 Tabs by mouth every day for 13 days. 46 Tab 0   • oxyCODONE immediate-release (ROXICODONE) 5 MG Tab Take 1 Tab by mouth every four hours as needed for Severe Pain for up to 10 days. 20 Tab 0   • NON SPECIFIED Take 1 Tab by mouth every day. [place] Gum     • NON SPECIFIED Take 1 Tab by mouth  "every day. Butter Bur     • ibuprofen (MOTRIN) 200 MG Tab Take 200-600 mg by mouth every 8 hours as needed.       No current facility-administered medications for this visit.      She  has no past medical history on file.    ROS  No chest pain, no shortness of breath, no abdominal pain, no diarrhea/constipation, no urinary symptoms.     Objective:     Blood pressure 112/64, pulse 85, temperature 37.1 °C (98.8 °F), temperature source Temporal, resp. rate 14, height 1.727 m (5' 8\"), weight 78.3 kg (172 lb 9.9 oz), SpO2 100 %, not currently breastfeeding. Body mass index is 26.25 kg/m².  Physical Exam:  Alert, oriented in no acute distress.  Eye contact is good, speech goal directed, affect calm  HEENT: conjunctiva non-injected, sclera non-icteric.  Pinna normal.   Neck: No adenopathy or masses in the neck or supraclavicular regions.  Lungs: clear to auscultation bilaterally with good excursion.  CV: regular rate and rhythm.  Abdomen: soft, nontender, No CVAT  Ext: no edema, color normal, vascularity normal, temperature normal    Assessment and Plan:   The following treatment plan was discussed  1.  Subacute daily persistent headache, intractable  AQUAPORIN-4 RECEPTOR AB    WESTERGREN SED RATE    CRP QUANTITIVE (NON-CARDIAC)    KEM TITER    DIXON AB IGG    ANTI-DNA (DS)    MR-CERVICAL SPINE-WITH & W/O    REFERRAL TO NEUROLOGY    DISCONTINUED: dexamethasone (DECADRON) 0.5 MG Tab    DISCONTINUED: dexamethasone (DECADRON) 0.5 MG Tab    CANCELED: REFERRAL TO NEUROLOGY    Consider freeze-dried skullcap 350 mg twice daily after the tapered course of dexamethasone starting with 2 mg twice daily.  Should the sympotms persist.  Stop John at this time.  Discussed with patient of her MRI has not been able to have contrast at that time that it would be beneficial for further investigation of the cervical spine with and without and perhaps repeat the brain MRI with contrast.  Discussed with patient of the ED precaution should " the symptoms not improve.  Discussed with patient and patient's sister of needed precaution.   2. Hiatal hernia with GERD      Continue with mastic gum 1000 mg.  Clinically improved discussed with patient of the importance in continuation gastrointestinal symptom improvement with the mucosa protecting.   3. Anxiety      Patient possibly can benefit from using continued acupucnture with additional help from qigong.  Also possibly consider utilizing lemon balm should the dexamethasone use resulted into described emotional stimulating side effect.   4. Confluent subcortical white matter abnormalities present on MRI  REFERRAL TO NEUROLOGY    Discussed with the result of confluent subcortical white matter abnormalities which had made her very anxious.  Discussed the continual vision disturbance but has not modified with triptan's.  Discussed with patient and patient agrees for the neurology referral for further investigation of the finding.  Discussed with patient in detail of the possible routine in diagnosis algorithm and possible lumbar puncture in the future.       Followup: Return in about 4 weeks (around 4/15/2019).    Spent 25 minutes in face-to-tace patient contact in which greater than 50% of the visit was spent in counseling and coordination of care including Headache management options, Answering patient questions about AQP4, Concerns and potential risks related to dexamethasone, Discussing in depth the importance of primary prevention of CVA and systemic immune response, Discussing the nature of MRI findings and Patient is also educated about lifestyle modifications which may improve her headache, her vision change, reflux  We also reviewed PMH, family history, and each of her chronic diagnoses in regards to current management, specialty physicians, symptom control, and future planning. Please refer to assessment and plan/discussion/recommendations for additional details.        Please note that dictation has  been dictated using voice recognition soft ware. I have made every reasonable attempt to correct obvious errors, but I expect that there are errors of grammar and possibly content that I did not discover before finalizing the note.

## 2019-03-18 NOTE — ASSESSMENT & PLAN NOTE
Worsened with the letter of results of her brain MRI.  In the meantime, can use Saffron 30 mg daily for the next 6 months.

## 2019-03-19 ENCOUNTER — HOSPITAL ENCOUNTER (INPATIENT)
Facility: MEDICAL CENTER | Age: 44
LOS: 3 days | DRG: 103 | End: 2019-03-22
Attending: EMERGENCY MEDICINE | Admitting: HOSPITALIST
Payer: COMMERCIAL

## 2019-03-19 ENCOUNTER — HOSPITAL ENCOUNTER (OUTPATIENT)
Dept: RADIOLOGY | Facility: MEDICAL CENTER | Age: 44
End: 2019-03-19

## 2019-03-19 ENCOUNTER — APPOINTMENT (OUTPATIENT)
Dept: RADIOLOGY | Facility: MEDICAL CENTER | Age: 44
DRG: 103 | End: 2019-03-19
Attending: HOSPITALIST
Payer: COMMERCIAL

## 2019-03-19 DIAGNOSIS — H53.8 BLURRY VISION, LEFT EYE: ICD-10-CM

## 2019-03-19 DIAGNOSIS — G35 MULTIPLE SCLEROSIS (HCC): ICD-10-CM

## 2019-03-19 DIAGNOSIS — R51.9 NONINTRACTABLE HEADACHE, UNSPECIFIED CHRONICITY PATTERN, UNSPECIFIED HEADACHE TYPE: ICD-10-CM

## 2019-03-19 DIAGNOSIS — F41.9 ANXIETY: ICD-10-CM

## 2019-03-19 DIAGNOSIS — G44.221 CHRONIC TENSION-TYPE HEADACHE, INTRACTABLE: ICD-10-CM

## 2019-03-19 PROBLEM — F17.200 SMOKER: Status: ACTIVE | Noted: 2019-03-19

## 2019-03-19 LAB
ALBUMIN SERPL BCP-MCNC: 4.9 G/DL (ref 3.2–4.9)
ALBUMIN/GLOB SERPL: 1.8 G/DL
ALP SERPL-CCNC: 32 U/L (ref 30–99)
ALT SERPL-CCNC: 17 U/L (ref 2–50)
ANION GAP SERPL CALC-SCNC: 11 MMOL/L (ref 0–11.9)
APPEARANCE UR: CLEAR
APTT PPP: 29.3 SEC (ref 24.7–36)
AST SERPL-CCNC: 23 U/L (ref 12–45)
BASOPHILS # BLD AUTO: 0.1 % (ref 0–1.8)
BASOPHILS # BLD: 0.01 K/UL (ref 0–0.12)
BILIRUB SERPL-MCNC: 0.6 MG/DL (ref 0.1–1.5)
BILIRUB UR QL STRIP.AUTO: NEGATIVE
BUN SERPL-MCNC: 11 MG/DL (ref 8–22)
CALCIUM SERPL-MCNC: 9.5 MG/DL (ref 8.5–10.5)
CHLORIDE SERPL-SCNC: 104 MMOL/L (ref 96–112)
CO2 SERPL-SCNC: 23 MMOL/L (ref 20–33)
COLOR UR: YELLOW
CREAT SERPL-MCNC: 0.73 MG/DL (ref 0.5–1.4)
EOSINOPHIL # BLD AUTO: 0 K/UL (ref 0–0.51)
EOSINOPHIL NFR BLD: 0 % (ref 0–6.9)
ERYTHROCYTE [DISTWIDTH] IN BLOOD BY AUTOMATED COUNT: 41.3 FL (ref 35.9–50)
GLOBULIN SER CALC-MCNC: 2.8 G/DL (ref 1.9–3.5)
GLUCOSE SERPL-MCNC: 109 MG/DL (ref 65–99)
GLUCOSE UR STRIP.AUTO-MCNC: NEGATIVE MG/DL
HCG SERPL QL: NEGATIVE
HCT VFR BLD AUTO: 38.3 % (ref 37–47)
HGB BLD-MCNC: 13.1 G/DL (ref 12–16)
IMM GRANULOCYTES # BLD AUTO: 0.02 K/UL (ref 0–0.11)
IMM GRANULOCYTES NFR BLD AUTO: 0.2 % (ref 0–0.9)
INR PPP: 1.12 (ref 0.87–1.13)
KETONES UR STRIP.AUTO-MCNC: 40 MG/DL
LEUKOCYTE ESTERASE UR QL STRIP.AUTO: NEGATIVE
LYMPHOCYTES # BLD AUTO: 0.84 K/UL (ref 1–4.8)
LYMPHOCYTES NFR BLD: 9.1 % (ref 22–41)
MAGNESIUM SERPL-MCNC: 1.9 MG/DL (ref 1.5–2.5)
MCH RBC QN AUTO: 30.2 PG (ref 27–33)
MCHC RBC AUTO-ENTMCNC: 34.2 G/DL (ref 33.6–35)
MCV RBC AUTO: 88.2 FL (ref 81.4–97.8)
MICRO URNS: ABNORMAL
MONOCYTES # BLD AUTO: 0.25 K/UL (ref 0–0.85)
MONOCYTES NFR BLD AUTO: 2.7 % (ref 0–13.4)
NEUTROPHILS # BLD AUTO: 8.16 K/UL (ref 2–7.15)
NEUTROPHILS NFR BLD: 87.9 % (ref 44–72)
NITRITE UR QL STRIP.AUTO: NEGATIVE
NRBC # BLD AUTO: 0 K/UL
NRBC BLD-RTO: 0 /100 WBC
PH UR STRIP.AUTO: 5.5 [PH]
PLATELET # BLD AUTO: 317 K/UL (ref 164–446)
PMV BLD AUTO: 10.9 FL (ref 9–12.9)
POTASSIUM SERPL-SCNC: 4.1 MMOL/L (ref 3.6–5.5)
PROT SERPL-MCNC: 7.7 G/DL (ref 6–8.2)
PROT UR QL STRIP: NEGATIVE MG/DL
PROTHROMBIN TIME: 14.5 SEC (ref 12–14.6)
RBC # BLD AUTO: 4.34 M/UL (ref 4.2–5.4)
RBC UR QL AUTO: NEGATIVE
SODIUM SERPL-SCNC: 138 MMOL/L (ref 135–145)
SP GR UR STRIP.AUTO: 1.01
UROBILINOGEN UR STRIP.AUTO-MCNC: 0.2 MG/DL
WBC # BLD AUTO: 9.3 K/UL (ref 4.8–10.8)

## 2019-03-19 PROCEDURE — 86235 NUCLEAR ANTIGEN ANTIBODY: CPT

## 2019-03-19 PROCEDURE — 86225 DNA ANTIBODY NATIVE: CPT

## 2019-03-19 PROCEDURE — 85730 THROMBOPLASTIN TIME PARTIAL: CPT

## 2019-03-19 PROCEDURE — 84703 CHORIONIC GONADOTROPIN ASSAY: CPT

## 2019-03-19 PROCEDURE — 83735 ASSAY OF MAGNESIUM: CPT

## 2019-03-19 PROCEDURE — 96365 THER/PROPH/DIAG IV INF INIT: CPT

## 2019-03-19 PROCEDURE — 85610 PROTHROMBIN TIME: CPT

## 2019-03-19 PROCEDURE — 85025 COMPLETE CBC W/AUTO DIFF WBC: CPT

## 2019-03-19 PROCEDURE — 80053 COMPREHEN METABOLIC PANEL: CPT

## 2019-03-19 PROCEDURE — 99223 1ST HOSP IP/OBS HIGH 75: CPT | Performed by: HOSPITALIST

## 2019-03-19 PROCEDURE — 700111 HCHG RX REV CODE 636 W/ 250 OVERRIDE (IP): Performed by: EMERGENCY MEDICINE

## 2019-03-19 PROCEDURE — 700102 HCHG RX REV CODE 250 W/ 637 OVERRIDE(OP): Performed by: HOSPITALIST

## 2019-03-19 PROCEDURE — A9270 NON-COVERED ITEM OR SERVICE: HCPCS | Performed by: HOSPITALIST

## 2019-03-19 PROCEDURE — 700111 HCHG RX REV CODE 636 W/ 250 OVERRIDE (IP): Performed by: HOSPITALIST

## 2019-03-19 PROCEDURE — 81003 URINALYSIS AUTO W/O SCOPE: CPT

## 2019-03-19 PROCEDURE — 700105 HCHG RX REV CODE 258: Performed by: EMERGENCY MEDICINE

## 2019-03-19 PROCEDURE — 99285 EMERGENCY DEPT VISIT HI MDM: CPT

## 2019-03-19 PROCEDURE — 700105 HCHG RX REV CODE 258: Performed by: HOSPITALIST

## 2019-03-19 PROCEDURE — 36415 COLL VENOUS BLD VENIPUNCTURE: CPT

## 2019-03-19 PROCEDURE — 86038 ANTINUCLEAR ANTIBODIES: CPT

## 2019-03-19 PROCEDURE — 94760 N-INVAS EAR/PLS OXIMETRY 1: CPT

## 2019-03-19 PROCEDURE — 770001 HCHG ROOM/CARE - MED/SURG/GYN PRIV*

## 2019-03-19 RX ORDER — DIAZEPAM 5 MG/1
7.5 TABLET ORAL ONCE
Status: COMPLETED | OUTPATIENT
Start: 2019-03-20 | End: 2019-03-19

## 2019-03-19 RX ORDER — PROMETHAZINE HYDROCHLORIDE 25 MG/1
12.5-25 TABLET ORAL EVERY 4 HOURS PRN
Status: DISCONTINUED | OUTPATIENT
Start: 2019-03-19 | End: 2019-03-22 | Stop reason: HOSPADM

## 2019-03-19 RX ORDER — VALPROATE SODIUM 100 MG/ML
500 INJECTION INTRAVENOUS ONCE
Status: COMPLETED | OUTPATIENT
Start: 2019-03-19 | End: 2019-03-20

## 2019-03-19 RX ORDER — OXYCODONE HYDROCHLORIDE 5 MG/1
2.5 TABLET ORAL
Status: DISCONTINUED | OUTPATIENT
Start: 2019-03-19 | End: 2019-03-22 | Stop reason: HOSPADM

## 2019-03-19 RX ORDER — CALCIUM CARBONATE 500 MG/1
500 TABLET, CHEWABLE ORAL 3 TIMES DAILY PRN
Status: DISCONTINUED | OUTPATIENT
Start: 2019-03-19 | End: 2019-03-22 | Stop reason: HOSPADM

## 2019-03-19 RX ORDER — MAGNESIUM SULFATE 1 G/100ML
1 INJECTION INTRAVENOUS ONCE
Status: COMPLETED | OUTPATIENT
Start: 2019-03-19 | End: 2019-03-19

## 2019-03-19 RX ORDER — ONDANSETRON 4 MG/1
4 TABLET, ORALLY DISINTEGRATING ORAL EVERY 4 HOURS PRN
Status: DISCONTINUED | OUTPATIENT
Start: 2019-03-19 | End: 2019-03-22 | Stop reason: HOSPADM

## 2019-03-19 RX ORDER — OXYCODONE HYDROCHLORIDE 5 MG/1
5 TABLET ORAL
Status: DISCONTINUED | OUTPATIENT
Start: 2019-03-19 | End: 2019-03-22 | Stop reason: HOSPADM

## 2019-03-19 RX ORDER — DIPHENHYDRAMINE HYDROCHLORIDE 50 MG/ML
25 INJECTION INTRAMUSCULAR; INTRAVENOUS ONCE
Status: COMPLETED | OUTPATIENT
Start: 2019-03-19 | End: 2019-03-19

## 2019-03-19 RX ORDER — IBUPROFEN 200 MG
200-600 TABLET ORAL EVERY 8 HOURS PRN
Status: DISCONTINUED | OUTPATIENT
Start: 2019-03-19 | End: 2019-03-21

## 2019-03-19 RX ORDER — POLYETHYLENE GLYCOL 3350 17 G/17G
1 POWDER, FOR SOLUTION ORAL
Status: DISCONTINUED | OUTPATIENT
Start: 2019-03-19 | End: 2019-03-22 | Stop reason: HOSPADM

## 2019-03-19 RX ORDER — AMOXICILLIN 250 MG
2 CAPSULE ORAL 2 TIMES DAILY
Status: DISCONTINUED | OUTPATIENT
Start: 2019-03-20 | End: 2019-03-22 | Stop reason: HOSPADM

## 2019-03-19 RX ORDER — METHYLPREDNISOLONE SODIUM SUCCINATE 125 MG/2ML
250 INJECTION, POWDER, LYOPHILIZED, FOR SOLUTION INTRAMUSCULAR; INTRAVENOUS ONCE
Status: DISCONTINUED | OUTPATIENT
Start: 2019-03-19 | End: 2019-03-19

## 2019-03-19 RX ORDER — ACETAMINOPHEN 325 MG/1
650 TABLET ORAL EVERY 6 HOURS PRN
Status: DISCONTINUED | OUTPATIENT
Start: 2019-03-19 | End: 2019-03-22 | Stop reason: HOSPADM

## 2019-03-19 RX ORDER — SODIUM CHLORIDE 9 MG/ML
INJECTION, SOLUTION INTRAVENOUS CONTINUOUS
Status: DISCONTINUED | OUTPATIENT
Start: 2019-03-19 | End: 2019-03-20

## 2019-03-19 RX ORDER — PROMETHAZINE HYDROCHLORIDE 25 MG/1
12.5-25 SUPPOSITORY RECTAL EVERY 4 HOURS PRN
Status: DISCONTINUED | OUTPATIENT
Start: 2019-03-19 | End: 2019-03-22 | Stop reason: HOSPADM

## 2019-03-19 RX ORDER — ONDANSETRON 2 MG/ML
4 INJECTION INTRAMUSCULAR; INTRAVENOUS EVERY 4 HOURS PRN
Status: DISCONTINUED | OUTPATIENT
Start: 2019-03-19 | End: 2019-03-22 | Stop reason: HOSPADM

## 2019-03-19 RX ORDER — MORPHINE SULFATE 4 MG/ML
2 INJECTION, SOLUTION INTRAMUSCULAR; INTRAVENOUS
Status: DISCONTINUED | OUTPATIENT
Start: 2019-03-19 | End: 2019-03-22 | Stop reason: HOSPADM

## 2019-03-19 RX ORDER — BISACODYL 10 MG
10 SUPPOSITORY, RECTAL RECTAL
Status: DISCONTINUED | OUTPATIENT
Start: 2019-03-19 | End: 2019-03-22 | Stop reason: HOSPADM

## 2019-03-19 RX ADMIN — ANTACID TABLETS 500 MG: 500 TABLET, CHEWABLE ORAL at 20:02

## 2019-03-19 RX ADMIN — SODIUM CHLORIDE: 9 INJECTION, SOLUTION INTRAVENOUS at 19:02

## 2019-03-19 RX ADMIN — MORPHINE SULFATE 2 MG: 4 INJECTION INTRAVENOUS at 19:01

## 2019-03-19 RX ADMIN — DIPHENHYDRAMINE HYDROCHLORIDE 25 MG: 50 INJECTION INTRAMUSCULAR; INTRAVENOUS at 22:32

## 2019-03-19 RX ADMIN — DIAZEPAM 7.5 MG: 5 TABLET ORAL at 23:47

## 2019-03-19 RX ADMIN — SODIUM CHLORIDE 250 MG: 9 INJECTION, SOLUTION INTRAVENOUS at 17:53

## 2019-03-19 RX ADMIN — IBUPROFEN 600 MG: 200 TABLET, FILM COATED ORAL at 20:03

## 2019-03-19 RX ADMIN — MAGNESIUM SULFATE IN DEXTROSE 1 G: 10 INJECTION, SOLUTION INTRAVENOUS at 22:32

## 2019-03-19 ASSESSMENT — COPD QUESTIONNAIRES
DO YOU EVER COUGH UP ANY MUCUS OR PHLEGM?: NO/ONLY WITH OCCASIONAL COLDS OR INFECTIONS
HAVE YOU SMOKED AT LEAST 100 CIGARETTES IN YOUR ENTIRE LIFE: NO/DON'T KNOW
DURING THE PAST 4 WEEKS HOW MUCH DID YOU FEEL SHORT OF BREATH: NONE/LITTLE OF THE TIME
COPD SCREENING SCORE: 0

## 2019-03-19 ASSESSMENT — ENCOUNTER SYMPTOMS
TREMORS: 0
NAUSEA: 0
DOUBLE VISION: 0
PHOTOPHOBIA: 1
FEVER: 0
CHILLS: 0
BACK PAIN: 0
DIZZINESS: 0
ORTHOPNEA: 0
SINUS PAIN: 0
DEPRESSION: 0
PALPITATIONS: 0
WEAKNESS: 0
HEMOPTYSIS: 0
COUGH: 0
NECK PAIN: 0
HEADACHES: 1
MYALGIAS: 0
HEARTBURN: 0
BLURRED VISION: 1
TINGLING: 0

## 2019-03-19 ASSESSMENT — COGNITIVE AND FUNCTIONAL STATUS - GENERAL
SUGGESTED CMS G CODE MODIFIER DAILY ACTIVITY: CI
MOBILITY SCORE: 24
DAILY ACTIVITIY SCORE: 23
DRESSING REGULAR LOWER BODY CLOTHING: A LITTLE
SUGGESTED CMS G CODE MODIFIER MOBILITY: CH

## 2019-03-19 ASSESSMENT — LIFESTYLE VARIABLES
EVER_SMOKED: NEVER
DO YOU DRINK ALCOHOL: NO

## 2019-03-19 ASSESSMENT — PATIENT HEALTH QUESTIONNAIRE - PHQ9
1. LITTLE INTEREST OR PLEASURE IN DOING THINGS: NOT AT ALL
2. FEELING DOWN, DEPRESSED, IRRITABLE, OR HOPELESS: NOT AT ALL
SUM OF ALL RESPONSES TO PHQ9 QUESTIONS 1 AND 2: 0

## 2019-03-19 NOTE — ED NOTES
Pt ambulatory to . Agree with triage note. Pt changed into gown and connected to monitor. VS stable, HR decreased. PIV initiated, blood drawn and sent to lab. Pt sister now at bedside. no unilateral weakness assessed. PERRLA. Pt reports some numbness to arms occasionally. Chart up for ERP.

## 2019-03-19 NOTE — ED NOTES
Pt ambulatory to restroom now, clean catch instructions given, pt verbalized understanding. Urine sent to lab.

## 2019-03-19 NOTE — ED PROVIDER NOTES
"ED Provider Note    CHIEF COMPLAINT  Chief Complaint   Patient presents with   • Headache   • Blurred Vision       HPI  Daksha Senior is a 43 y.o. female who presents for evaluation of headache and blurred vision.  Patient states she has been having over a month of right-sided headache along with some blurred vision in her right eye.  The patient was seen by her optometrist and evaluation showed no evidence of papilledema or other abnormalities on funduscopic exam.  The patient ultimately had an MRI at [facility] which showed findings suggestive of multiple sclerosis.  The patient was seen by her primary care physician yesterday and started on Decadron.  Patient presents because of increased headache over the right side along with blurred vision in both her right and now her left eyes.  She denies: Fever, chills, URI symptoms, cardiorespiratory symptoms, gastrointestinal symptoms, genitourinary symptoms, unilateral motor weakness or paresthesias, vertigo, ataxia.  No other acute symptomatology or complaints.    REVIEW OF SYSTEMS  See HPI for further details.  Patient's had previous cardiac workup recently which was negative.  There is no history of: Hypertension, diabetes, thyroid dysfunction, seizures, heart disease, cancer, stroke.  Patient has an IUD in place.  All other systems negative.    PAST MEDICAL HISTORY  No past medical history on file.    FAMILY HISTORY  Family History   Problem Relation Age of Onset   • Cancer Father        SOCIAL HISTORY  Positive tobacco use; positive alcohol use;    SURGICAL HISTORY  No past surgical history on file.    CURRENT MEDICATIONS  See nurse's notes    ALLERGIES  No Known Allergies    PHYSICAL EXAM  VITAL SIGNS: /76   Pulse 84   Temp 37.1 °C (98.7 °F) (Temporal)   Resp 16   Ht 1.727 m (5' 8\")   Wt 77 kg (169 lb 12.1 oz)   SpO2 98%   BMI 25.81 kg/m²    Constitutional: Pleasant 43-year-old female, well developed, Well nourished, No acute distress, " Non-toxic appearance.   HENT: Normocephalic, Atraumatic, Nares:Clear, Oropharynx: moist, well hydrated, posterior pharynx:clear   Eyes: PERRL, EOMI, no APD, no visual field defects, conjunctiva normal, No discharge.   Neck: Normal range of motion, No tenderness, Supple, No stridor.  No appreciable thyromegaly; no carotid bruit;  Lymphatic: No lymphadenopathy noted.   Cardiovascular: Regular rate and rhythm without mumurs, gallups, rubs   Thorax & Lungs: Normal Equal breath sounds, No respiratory distress, No wheezing, no stridor, no rales. No chest tenderness.   Abdomen: Soft, nontender, nondistended, no organomegaly, positive bowel sounds normal in quality. No guarding or rebound.  Skin: Good skin turgor, pink, warm, dry. No rashes, petechiae, purpura. Normal capillary refill.   Back: No tenderness, No CVA tenderness.   Extremities: Intact distal pulses, No edema, No tenderness, No cyanosis,  Vascular: Pulses are 2+, symmetric in the upper and lower extremities.  Musculoskeletal: Good range of motion in all major joints. No tenderness to palpation or major deformities noted.   Neurologic: Alert & oriented x 3,  No gross focal deficits noted.  Motor 5/5; sensation intact light touch; finger-nose normal;  Psychiatric: Affect normal, Judgment normal, Mood normal.       RADIOLOGY/PROCEDURES  MRI from [place] diagnostic showed:  Midline structures are intact.  Vascular flow voids at the base of the brain are patent.  No intra-or extra axial fluid collections.  There is no evidence of restricted diffusion to suggest the presence of acute infarct.  No masses, midline shift or mass-effect.  There is a moderate burden of periventricular and deep white arm matter signal abnormality.  These white matter plaques demonstrate perpendicular configuration with the ependymal surface with a flame-shaped, characteristic of multiple sclerosis.  Mild chronic mucosal thickening in the right maxillary sinus.  Mastoid air cells are clear.       COURSE & MEDICAL DECISION MAKING  Pertinent Labs & Imaging studies reviewed. (See chart for details)  1.  Saline lock  2.  Solu-Medrol 250 mg IV    Laboratory studies: CBC shows white count 9.3, 87% neutrophils, 9% lymphocytes, 2% monocytes, hemoglobin 13.1 hematocrit 30.3; CMP within normal; coags within normal; beta-hCG is negative;    At this time, patient presents complaining of headache along with increased blurred vision in her right eye along with her left eye.  The most likely cause of this would seem to be an exacerbation of recently diagnosed multiple sclerosis.  At this time, I spoke with the hospitalist on call.  The patient will be admitted for further monitoring, treatment, and care.    FINAL IMPRESSION  1. Multiple sclerosis (HCC)    2. Blurry vision, left eye    3. Nonintractable headache, unspecified chronicity pattern, unspecified headache type        PLAN  1.  Patient will be admitted for further monitoring, treatment, and care.    Electronically signed by: Guy G Gansert, 3/19/2019 2:55 PM

## 2019-03-19 NOTE — ED TRIAGE NOTES
"Pt c/o headache and blurred vision to right eye since feb. Pt had MRI and [facility] and told possible MS. Pt c/o worsened blurred vision now to bilateral eyes and seeing \"bright lights\". Pt states she is currently on steroids since yesterday. Pt c/o pain behind eye and down into shoulders.   "

## 2019-03-19 NOTE — ED NOTES
Med Rec completed per patient  Allergies reviewed  No ORAL antibiotics in last 30 days    Patient has had 3 doses of her Dexamethasone

## 2019-03-20 PROBLEM — G35 MULTIPLE SCLEROSIS (HCC): Status: ACTIVE | Noted: 2019-03-20

## 2019-03-20 LAB
25(OH)D3 SERPL-MCNC: 20 NG/ML (ref 30–100)
ANION GAP SERPL CALC-SCNC: 8 MMOL/L (ref 0–11.9)
BUN SERPL-MCNC: 14 MG/DL (ref 8–22)
CALCIUM SERPL-MCNC: 9 MG/DL (ref 8.5–10.5)
CHLORIDE SERPL-SCNC: 107 MMOL/L (ref 96–112)
CO2 SERPL-SCNC: 23 MMOL/L (ref 20–33)
CREAT SERPL-MCNC: 0.76 MG/DL (ref 0.5–1.4)
ERYTHROCYTE [DISTWIDTH] IN BLOOD BY AUTOMATED COUNT: 40.4 FL (ref 35.9–50)
ERYTHROCYTE [SEDIMENTATION RATE] IN BLOOD BY WESTERGREN METHOD: 1 MM/HOUR (ref 0–20)
GLUCOSE BLD-MCNC: 161 MG/DL (ref 65–99)
GLUCOSE BLD-MCNC: 167 MG/DL (ref 65–99)
GLUCOSE SERPL-MCNC: 160 MG/DL (ref 65–99)
HCT VFR BLD AUTO: 36.5 % (ref 37–47)
HGB BLD-MCNC: 12.3 G/DL (ref 12–16)
MCH RBC QN AUTO: 29.6 PG (ref 27–33)
MCHC RBC AUTO-ENTMCNC: 33.7 G/DL (ref 33.6–35)
MCV RBC AUTO: 88 FL (ref 81.4–97.8)
PLATELET # BLD AUTO: 312 K/UL (ref 164–446)
PMV BLD AUTO: 11 FL (ref 9–12.9)
POTASSIUM SERPL-SCNC: 4.3 MMOL/L (ref 3.6–5.5)
RBC # BLD AUTO: 4.15 M/UL (ref 4.2–5.4)
SODIUM SERPL-SCNC: 138 MMOL/L (ref 135–145)
WBC # BLD AUTO: 8.4 K/UL (ref 4.8–10.8)

## 2019-03-20 PROCEDURE — 70553 MRI BRAIN STEM W/O & W/DYE: CPT

## 2019-03-20 PROCEDURE — A9270 NON-COVERED ITEM OR SERVICE: HCPCS | Performed by: PSYCHIATRY & NEUROLOGY

## 2019-03-20 PROCEDURE — 700105 HCHG RX REV CODE 258: Performed by: PSYCHIATRY & NEUROLOGY

## 2019-03-20 PROCEDURE — A9270 NON-COVERED ITEM OR SERVICE: HCPCS | Performed by: HOSPITALIST

## 2019-03-20 PROCEDURE — 700102 HCHG RX REV CODE 250 W/ 637 OVERRIDE(OP): Performed by: PSYCHIATRY & NEUROLOGY

## 2019-03-20 PROCEDURE — 700117 HCHG RX CONTRAST REV CODE 255: Performed by: HOSPITALIST

## 2019-03-20 PROCEDURE — 80048 BASIC METABOLIC PNL TOTAL CA: CPT

## 2019-03-20 PROCEDURE — 36415 COLL VENOUS BLD VENIPUNCTURE: CPT

## 2019-03-20 PROCEDURE — 82962 GLUCOSE BLOOD TEST: CPT | Mod: 91

## 2019-03-20 PROCEDURE — 700102 HCHG RX REV CODE 250 W/ 637 OVERRIDE(OP): Performed by: HOSPITALIST

## 2019-03-20 PROCEDURE — 82306 VITAMIN D 25 HYDROXY: CPT

## 2019-03-20 PROCEDURE — 86140 C-REACTIVE PROTEIN: CPT

## 2019-03-20 PROCEDURE — 700101 HCHG RX REV CODE 250: Performed by: HOSPITALIST

## 2019-03-20 PROCEDURE — 86618 LYME DISEASE ANTIBODY: CPT

## 2019-03-20 PROCEDURE — 770001 HCHG ROOM/CARE - MED/SURG/GYN PRIV*

## 2019-03-20 PROCEDURE — 700111 HCHG RX REV CODE 636 W/ 250 OVERRIDE (IP): Performed by: PSYCHIATRY & NEUROLOGY

## 2019-03-20 PROCEDURE — 85027 COMPLETE CBC AUTOMATED: CPT

## 2019-03-20 PROCEDURE — 99233 SBSQ HOSP IP/OBS HIGH 50: CPT | Performed by: HOSPITALIST

## 2019-03-20 PROCEDURE — 85652 RBC SED RATE AUTOMATED: CPT

## 2019-03-20 PROCEDURE — 700105 HCHG RX REV CODE 258: Performed by: HOSPITALIST

## 2019-03-20 PROCEDURE — A9585 GADOBUTROL INJECTION: HCPCS | Performed by: HOSPITALIST

## 2019-03-20 PROCEDURE — 99223 1ST HOSP IP/OBS HIGH 75: CPT | Mod: GC | Performed by: PSYCHIATRY & NEUROLOGY

## 2019-03-20 PROCEDURE — 700111 HCHG RX REV CODE 636 W/ 250 OVERRIDE (IP): Performed by: HOSPITALIST

## 2019-03-20 RX ORDER — OMEPRAZOLE 20 MG/1
20 CAPSULE, DELAYED RELEASE ORAL DAILY
Status: DISCONTINUED | OUTPATIENT
Start: 2019-03-20 | End: 2019-03-22 | Stop reason: HOSPADM

## 2019-03-20 RX ORDER — SODIUM CHLORIDE 9 MG/ML
INJECTION, SOLUTION INTRAVENOUS CONTINUOUS
Status: DISPENSED | OUTPATIENT
Start: 2019-03-20 | End: 2019-03-21

## 2019-03-20 RX ORDER — ERGOCALCIFEROL 1.25 MG/1
50000 CAPSULE ORAL
Status: DISCONTINUED | OUTPATIENT
Start: 2019-03-20 | End: 2019-03-22 | Stop reason: HOSPADM

## 2019-03-20 RX ORDER — ALPRAZOLAM 0.5 MG/1
0.5 TABLET ORAL 3 TIMES DAILY PRN
Status: DISCONTINUED | OUTPATIENT
Start: 2019-03-20 | End: 2019-03-22 | Stop reason: HOSPADM

## 2019-03-20 RX ORDER — GADOBUTROL 604.72 MG/ML
10 INJECTION INTRAVENOUS ONCE
Status: COMPLETED | OUTPATIENT
Start: 2019-03-20 | End: 2019-03-20

## 2019-03-20 RX ORDER — DEXTROSE MONOHYDRATE 25 G/50ML
25 INJECTION, SOLUTION INTRAVENOUS
Status: DISCONTINUED | OUTPATIENT
Start: 2019-03-20 | End: 2019-03-22 | Stop reason: HOSPADM

## 2019-03-20 RX ADMIN — ACETAMINOPHEN 650 MG: 325 TABLET, FILM COATED ORAL at 06:37

## 2019-03-20 RX ADMIN — SENNOSIDES AND DOCUSATE SODIUM 2 TABLET: 8.6; 5 TABLET ORAL at 06:00

## 2019-03-20 RX ADMIN — INSULIN HUMAN 3 UNITS: 100 INJECTION, SOLUTION PARENTERAL at 22:01

## 2019-03-20 RX ADMIN — OXYCODONE HYDROCHLORIDE 5 MG: 5 TABLET ORAL at 18:45

## 2019-03-20 RX ADMIN — ANTACID TABLETS 500 MG: 500 TABLET, CHEWABLE ORAL at 23:08

## 2019-03-20 RX ADMIN — ERGOCALCIFEROL 50000 UNITS: 1.25 CAPSULE ORAL at 21:44

## 2019-03-20 RX ADMIN — MORPHINE SULFATE 2 MG: 4 INJECTION INTRAVENOUS at 09:01

## 2019-03-20 RX ADMIN — LIDOCAINE HYDROCHLORIDE 30 ML: 20 SOLUTION OROPHARYNGEAL at 12:58

## 2019-03-20 RX ADMIN — ANTACID TABLETS 500 MG: 500 TABLET, CHEWABLE ORAL at 08:31

## 2019-03-20 RX ADMIN — SODIUM CHLORIDE: 9 INJECTION, SOLUTION INTRAVENOUS at 12:37

## 2019-03-20 RX ADMIN — SENNOSIDES AND DOCUSATE SODIUM 2 TABLET: 8.6; 5 TABLET ORAL at 18:00

## 2019-03-20 RX ADMIN — ALPRAZOLAM 0.5 MG: 0.5 TABLET ORAL at 12:36

## 2019-03-20 RX ADMIN — OMEPRAZOLE 20 MG: 20 CAPSULE, DELAYED RELEASE ORAL at 10:27

## 2019-03-20 RX ADMIN — GADOBUTROL 10 ML: 604.72 INJECTION INTRAVENOUS at 00:36

## 2019-03-20 RX ADMIN — ALPRAZOLAM 0.5 MG: 0.5 TABLET ORAL at 23:06

## 2019-03-20 RX ADMIN — SODIUM CHLORIDE 1000 MG: 9 INJECTION, SOLUTION INTRAVENOUS at 12:59

## 2019-03-20 RX ADMIN — LIDOCAINE HYDROCHLORIDE 20 ML: 10 INJECTION, SOLUTION INFILTRATION; PERINEURAL at 13:00

## 2019-03-20 RX ADMIN — OXYCODONE HYDROCHLORIDE 5 MG: 5 TABLET ORAL at 06:37

## 2019-03-20 RX ADMIN — VALPROATE SODIUM 500 MG: 500 INJECTION INTRAVENOUS at 01:31

## 2019-03-20 ASSESSMENT — ENCOUNTER SYMPTOMS
SHORTNESS OF BREATH: 0
RESPIRATORY NEGATIVE: 1
CARDIOVASCULAR NEGATIVE: 1
SORE THROAT: 0
GASTROINTESTINAL NEGATIVE: 1
NEUROLOGICAL NEGATIVE: 1
MUSCULOSKELETAL NEGATIVE: 1
DOUBLE VISION: 0
NERVOUS/ANXIOUS: 0
BLURRED VISION: 1
CONSTITUTIONAL NEGATIVE: 1
PALPITATIONS: 0
MYALGIAS: 0
BACK PAIN: 0
HEADACHES: 1
CHILLS: 0
NAUSEA: 0
DOUBLE VISION: 1
VOMITING: 0
COUGH: 0
DIZZINESS: 0
EYE PAIN: 0
PSYCHIATRIC NEGATIVE: 1
PHOTOPHOBIA: 0
DEPRESSION: 0
FEVER: 0

## 2019-03-20 NOTE — PROGRESS NOTES
Pt medicated for c/o HA with morphine IV PRN. NS infusion started. Discussed pt's care with NOC RN.

## 2019-03-20 NOTE — DISCHARGE PLANNING
Current documentation reveals a potential for acute inpatient rehabilitation, pending final w/u and TX evals.  Please consider a PMR referral to assist with discharge planning.

## 2019-03-20 NOTE — ED NOTES
Report received from YUSRA Tatum.    Patient resting on gurney. No acute distress. Denies needs at this time. Call bell within reach. Family remains at bedside. Updated regarding plan of care - awaiting admit.

## 2019-03-20 NOTE — PROGRESS NOTES
[facility] Medicine Daily Progress Note    Date of Service  3/20/2019    Chief Complaint  43 y.o. female admitted 3/19/2019 with right-sided headache and blurry vision.      Hospital Course    Patient is a 43-year-old female with a history of anxiety presented to the blurry vision.  Her symptoms have been ongoing and persistent but fluctuating in intensity for approximately 1 month.  She has seen an optometrist and was told her vision is normal.  She had an outpatient MRI which showed concerning findings for multiple sclerosis.  She presented to the hospital for further evaluation.  MRI with and without contrast is consistent with classic multiple sclerosis.  Neurology was consulted.  MRI of her cervical spine has been ordered.  She will need a lumbar puncture after the MRI.  Studies have been ordered as well.  She is on high-dose steroids for 5 days and then will need to be on prednisone for another 11 days.      Interval Problem Update  No acute events  MRI abnormal  Continues to complain of a headache    Consultants/Specialty  Neurology    Code Status  Full code    Disposition  Home in 1-2 days hopefully    Review of Systems  Review of Systems   Constitutional: Negative for chills and fever.   HENT: Negative for hearing loss and sore throat.    Eyes: Positive for blurred vision (Right eye). Negative for double vision.   Respiratory: Negative for cough and shortness of breath.    Cardiovascular: Negative for chest pain and palpitations.   Gastrointestinal: Negative for nausea and vomiting.   Genitourinary: Negative for dysuria and frequency.   Musculoskeletal: Negative for back pain and myalgias.   Skin: Negative for itching and rash.   Neurological: Positive for headaches. Negative for dizziness.   Psychiatric/Behavioral: Negative for depression. The patient is not nervous/anxious.         Physical Exam  Temp:  [36 °C (96.8 °F)-37.2 °C (98.9 °F)] 36.5 °C (97.7 °F)  Pulse:  [] 91  Resp:  [16-18] 18  BP:  "()/(60-71) 102/71  SpO2:  [95 %-100 %] 99 %    Physical Exam   Constitutional: She is oriented to person, place, and time. She appears well-developed and well-nourished.   HENT:   Head: Normocephalic and atraumatic.   Eyes: Pupils are equal, round, and reactive to light. Conjunctivae and EOM are normal.   Neck: Normal range of motion. Neck supple.   Cardiovascular: Normal rate and regular rhythm.    Pulmonary/Chest: Effort normal and breath sounds normal.   Abdominal: Soft. Bowel sounds are normal. She exhibits no distension.   Musculoskeletal: Normal range of motion. She exhibits no edema or tenderness.   Neurological: She is alert and oriented to person, place, and time.   Skin: Skin is warm and dry.   Nursing note and vitals reviewed.      Fluids    Intake/Output Summary (Last 24 hours) at 03/20/19 1620  Last data filed at 03/19/19 1902   Gross per 24 hour   Intake              250 ml   Output                0 ml   Net              250 ml       Laboratory  Recent Labs      03/19/19   1445  03/20/19   0231   WBC  9.3  8.4   RBC  4.34  4.15*   HEMOGLOBIN  13.1  12.3   HEMATOCRIT  38.3  36.5*   MCV  88.2  88.0   MCH  30.2  29.6   MCHC  34.2  33.7   RDW  41.3  40.4   PLATELETCT  317  312   MPV  10.9  11.0     Recent Labs      03/19/19   1445  03/20/19   0231   SODIUM  138  138   POTASSIUM  4.1  4.3   CHLORIDE  104  107   CO2  23  23   GLUCOSE  109*  160*   BUN  11  14   CREATININE  0.73  0.76   CALCIUM  9.5  9.0     Recent Labs      03/19/19   1445   APTT  29.3   INR  1.12               Imaging  MR-BRAIN-WITH & W/O   Final Result      There are multiple abnormal T2 hyperintensities in the subcortical and periventricular white matter consistent with demyelinating plaques. Abnormal contrast enhancement is seen in the 2 of the periventricular lesions likely representing \"active\"    demyelinating lesions.      OUTSIDE IMAGES-MR BRAIN   Final Result      MR-CERVICAL SPINE-WITH & W/O    (Results Pending)    "     Assessment/Plan  * Multiple sclerosis (HCC)- (present on admission)   Assessment & Plan    New diagnosis  MRI of her brain has been reviewed  Neurology following  MRI of her cervical spine has been ordered  Neurology recommended lumbar puncture which will need to be done after MRI  Lumbar puncture CSF studies have been ordered  High-dose steroids for 5 days; she can continue these on an outpatient basis once lumbar puncture and MRI have been done  She will need oral steroids thereafter for 11 days  Pain control for her headache     Headache- (present on admission)   Assessment & Plan    Findings consistent with MS  Pain control and symptom control     Smoker- (present on admission)   Assessment & Plan    Needs to quit     Anxiety- (present on admission)   Assessment & Plan    Xanax as needed     Hiatal hernia with GERD- (present on admission)   Assessment & Plan    Continue as needed Tums          VTE prophylaxis: SCDs

## 2019-03-20 NOTE — H&P
Hospital Medicine History & Physical Note    Date of Service  3/19/2019    Primary Care Physician  Terrell Sanders D.O.    Consultants  Neurology (phone)    Code Status  Full code    Chief Complaint  Right-sided headache    History of Presenting Illness  43 y.o. female who presented 3/19/2019 with past medical history of anxiety, smoker, is coming today complaining of right-sided headache and right-sided blurry vision for a month, she denies any trauma, headache is constant, intensity can increased up to 10 out of 10, and then will decrease to 3 out of 10, complaining of mild photophobia, denies any nausea or vomiting, no focal weakness, no abdominal pain diarrhea or constipation, patient was seen by primary care physician and had a MRI of the brain without contrast showing possible MS patient was started on oral Decadron with mild improvement on her headache, patient was also seen by ophthalmology as outpatient as per patient she was told that eye examination was normal, patient in the emergency room was started on IV Solu-Medrol, I have discussed with neurology who recommended to get a new MRI brain with contrast for better assessment, will continue with conservative management/supportive management, patient denies any history of chronic migraines or family history or personal history of MS, patient will be admitted to neurology floor, patient expressed understanding of her plan of care and agree with it, all questions answered.    Review of Systems  Review of Systems   Constitutional: Negative for chills and fever.   HENT: Negative for congestion, hearing loss, sinus pain and tinnitus.    Eyes: Positive for blurred vision (Right-sided) and photophobia (Mild). Negative for double vision.   Respiratory: Negative for cough and hemoptysis.    Cardiovascular: Negative for chest pain, palpitations and orthopnea.   Gastrointestinal: Negative for heartburn and nausea.   Genitourinary: Negative for dysuria, frequency and  urgency.   Musculoskeletal: Negative for back pain, myalgias and neck pain.   Neurological: Positive for headaches. Negative for dizziness, tingling, tremors and weakness.   Psychiatric/Behavioral: Negative for depression and suicidal ideas.       Past Medical History  Anxiety    Surgical History  No recent surgeries    Family History  family history includes Cancer in her father.     Social History   reports that she has been smoking Cigarettes.  She has never used smokeless tobacco. She reports that she drinks about 1.2 oz of alcohol per week . She reports that she does not use drugs.    Allergies  No Known Allergies    Medications  Prior to Admission Medications   Prescriptions Last Dose Informant Patient Reported? Taking?   NON SPECIFIED 1 WEEK Patient Yes Yes   Sig: Take 1 Tab by mouth every day. [place] Gum   NON SPECIFIED 1 WEEK Patient Yes Yes   Sig: Take 1 Tab by mouth every day. Butter Bur   dexamethasone (DECADRON) 0.5 MG Tab 3/19/2019 at 1200 Patient No No   Si mg morning and noon for 3 days then 1 mg twice daily for 4 days.  Then 0.5 mg morning and noon for 5 days, 0.5 mg every morning for 4 days.   ibuprofen (MOTRIN) 200 MG Tab 3 DAYS at PRN Patient Yes No   Sig: Take 200-600 mg by mouth every 8 hours as needed.      Facility-Administered Medications: None       Physical Exam  Temp:  [37.1 °C (98.7 °F)] 37.1 °C (98.7 °F)  Pulse:  [] 102  Resp:  [16-20] 16  BP: (104-130)/(60-88) 104/64  SpO2:  [98 %-100 %] 100 %    Physical Exam   Constitutional: She is oriented to person, place, and time. She appears well-developed and well-nourished. No distress.   HENT:   Head: Normocephalic and atraumatic.   Mouth/Throat: No oropharyngeal exudate.   Eyes: Pupils are equal, round, and reactive to light. Conjunctivae and EOM are normal. No scleral icterus.   Neck: Normal range of motion. Neck supple. No JVD present.   Cardiovascular: Regular rhythm and normal heart sounds.  Exam reveals no friction rub.     Pulmonary/Chest: Effort normal and breath sounds normal. No respiratory distress. She has no wheezes. She has no rales.   Abdominal: Soft. Bowel sounds are normal. She exhibits no distension. There is no tenderness. There is no rebound.   Musculoskeletal: Normal range of motion. She exhibits no tenderness.   Lymphadenopathy:     She has no cervical adenopathy.   Neurological: She is alert and oriented to person, place, and time. She exhibits normal muscle tone.   Skin: Skin is dry. No erythema.   Psychiatric: She has a normal mood and affect. Her behavior is normal.   Nursing note and vitals reviewed.      Laboratory:  Recent Labs      03/19/19   1445   WBC  9.3   RBC  4.34   HEMOGLOBIN  13.1   HEMATOCRIT  38.3   MCV  88.2   MCH  30.2   MCHC  34.2   RDW  41.3   PLATELETCT  317   MPV  10.9     Recent Labs      03/19/19   1445   SODIUM  138   POTASSIUM  4.1   CHLORIDE  104   CO2  23   GLUCOSE  109*   BUN  11   CREATININE  0.73   CALCIUM  9.5     Recent Labs      03/19/19   1445   ALTSGPT  17   ASTSGOT  23   ALKPHOSPHAT  32   TBILIRUBIN  0.6   GLUCOSE  109*     Recent Labs      03/19/19   1445   APTT  29.3   INR  1.12             No results for input(s): TROPONINI in the last 72 hours.    Urinalysis:    Recent Labs      03/19/19   1645   SPECGRAVITY  1.014   GLUCOSEUR  Negative   KETONES  40*   NITRITE  Negative   LEUKESTERAS  Negative        Imaging:  OUTSIDE IMAGES-MR BRAIN   Final Result      MR-BRAIN-WITH    (Results Pending)         Assessment/Plan:  I anticipate this patient will require at least two midnights for appropriate medical management, necessitating inpatient admission.    Headache- (present on admission)   Assessment & Plan    Right-sided headache with right-sided blurry vision with MRI without contrast at outside facility showing possible MS I have discussed with neurology Dr. Granados who recommended getting MRI with contrast, will treat for possible migraine with IV mag, IV Depakote (pregnancy test  negative), IV Benadryl.     Smoker- (present on admission)   Assessment & Plan    Needs to quit     Anxiety- (present on admission)   Assessment & Plan    Stable we will continue p.o. diazepam 50 minutes before MRI.     Hiatal hernia with GERD- (present on admission)   Assessment & Plan    Continue as needed Tums         VTE prophylaxis: SCDs

## 2019-03-20 NOTE — ASSESSMENT & PLAN NOTE
New diagnosis  MRI of her brain has been reviewed  Neurology following  MRI of her cervical spine unremarkable  Lumbar puncture to be done with interventional radiology this afternoon  Lumbar puncture CSF studies have been ordered  High-dose steroids for 5 days; she can continue these on an outpatient basis once lumbar puncture and MRI have been done  She will need oral steroids thereafter for 11 days  Pain control for her headache

## 2019-03-20 NOTE — ED NOTES
Report given via telephone to Neurosurgery RN Le.    Patient transported to floor via gurney in stable condition accompanied by transport and family. All belongings accounted for.

## 2019-03-20 NOTE — PROGRESS NOTES
Received report from NOC RN, all questions answered, call light within reach, bed locked and in lowest position. Hourly rounding in place.

## 2019-03-20 NOTE — DISCHARGE PLANNING
Care Transition Team Assessment    Information Source  Orientation : Oriented x 4  Who is responsible for making decisions for patient? : Patient         Elopement Risk  Legal Hold: No  Ambulatory or Self Mobile in Wheelchair: Yes  Disoriented: No  Psychiatric Symptoms: None  History of Wandering: No  Elopement this Admit: No  Vocalizing Wanting to Leave: No  Displays Behaviors, Body Language Wanting to Leave: No-Not at Risk for Elopement  Elopement Risk: Not at Risk for Elopement    Interdisciplinary Discharge Planning  Does Admitting Nurse Feel This Could be a Complex Discharge?: No  Primary Care Physician: Dr. Terrell Sanders  Lives with - Patient's Self Care Capacity: Spouse, Child Less than 18 Years of Age  Patient or legal guardian wants to designate a caregiver (see row info): No  Support Systems: Spouse / Significant Other  Housing / Facility: 2 Story House (14 steps)  Do You Take your Prescribed Medications Regularly: Yes  Able to Return to Previous ADL's: Yes  Mobility Issues: No  Prior Services: Home-Independent  Patient Expects to be Discharged to:: home  Assistance Needed: No  Durable Medical Equipment: Not Applicable    Discharge Preparedness  What is your plan after discharge?: Home with help  What are your discharge supports?: Spouse  Prior Functional Level: Ambulatory, Drives Self, Independent with Activities of Daily Living, Independent with Medication Management  Difficulity with ADLs: None  Difficulity with IADLs: None    Functional Assesment  Prior Functional Level: Ambulatory, Drives Self, Independent with Activities of Daily Living, Independent with Medication Management    Finances  Financial Barriers to Discharge: No  Prescription Coverage: Yes    Vision / Hearing Impairment  Right Eye Vision: Wears Glasses  Left Eye Vision: Wears Glasses              Domestic Abuse  Have you ever been the victim of abuse or violence?: No              Anticipated Discharge Information  Anticipated discharge  disposition: Home

## 2019-03-20 NOTE — DISCHARGE PLANNING
Anticipated Discharge Disposition:   Home with outpt IV steroid infusion.     Action:  Discussed with IDT :  MD indicated that the plan will be [facility] for IV steroid infusion.   MS work up being completed.     Met with pt   Pt is agreeable with [facility].  She works in the community and drives.  also works and may not be able to help her. Pt has 3 minor children ages 5,9,11 years old. Pt is normally independent.   Pt mentioned that she still needs to do an MRI and LP then the MD will decide when she can be discharge home with [facility]     Barriers to Discharge:   Medical Clearance  [facility] set up      Plan:   Will need IV steroid prescription for [facility].  Call [facility] to set up appointments once prescription is received.   Update pt.

## 2019-03-20 NOTE — CONSULTS
Neurology consult     The reason for this consult is MS  Note Author: Luis Antonio Grande M.D.       Name Daksha Senior 1975   Age/Sex 43 y.o. female   MRN [id number]       Chief Complaint:   R sided headache     HPI:  43 year old female with past medical history of anxiety came with headache for a month, The headache is constant on the R side, not radiated, increasing and decreasing in severity but no triggers, no nausea or vomiting and no weakness or numbness on her arms or legs, she has pain on her R eye when she moves she looks around, no redness or drainage on her eyes, MRI was done as out patient and suspect MS, started with decdran she received only one dose and later she had severe blurry vision so she decided to come to the hospital, MRI at the hospital showed active demyelination lesions and sulemedrol 1 g has been started.   The patient denied any other symptoms in all her life, no numbness or weakness and no other neuro deficit, no family history of MS or immune diseases.      Review of Systems   Constitutional: Negative.    HENT: Negative.    Eyes: Positive for double vision. Negative for photophobia and pain.   Respiratory: Negative.    Cardiovascular: Negative.    Gastrointestinal: Negative.    Genitourinary: Negative.    Musculoskeletal: Negative.    Skin: Negative.    Neurological: Negative.    Psychiatric/Behavioral: Negative.                Past Surgical History:  No past surgical history on file.    Current Outpatient Medications:  Home Medications     Reviewed by Yoseph Bolivar (Pharmacy Tech) on 19 at 1639  Med List Status: Complete   Medication Last Dose Status   dexamethasone (DECADRON) 0.5 MG Tab 3/19/2019 Active   ibuprofen (MOTRIN) 200 MG Tab 3 DAYS Active   NON SPECIFIED 1 WEEK Active   NON SPECIFIED 1 WEEK Active                Medication Allergy/Sensitivities:  No Known Allergies      Family History (mandatory)   Family History   Problem Relation Age of  "Onset   • Cancer Father        Social History (mandatory)   Social History     Social History   • Marital status:      Spouse name: N/A   • Number of children: N/A   • Years of education: N/A     Occupational History   • Not on file.     Social History Main Topics   • Smoking status: Light Tobacco Smoker     Types: Cigarettes   • Smokeless tobacco: Never Used      Comment: socially   • Alcohol use 1.2 oz/week     2 Glasses of wine per week   • Drug use: No   • Sexual activity: Yes     Partners: Male     Birth control/ protection: IUD     Other Topics Concern   • Not on file     Social History Narrative   • No narrative on file       Physical Exam     Vitals:    03/19/19 1941 03/19/19 2308 03/20/19 0400 03/20/19 0724   BP:  (!) 97/60 100/60 102/71   Pulse: 96 75 62 91   Resp: 18 18 16 18   Temp:  36.4 °C (97.5 °F) 36 °C (96.8 °F) 36.5 °C (97.7 °F)   TempSrc:  Temporal Temporal Temporal   SpO2: 98% 96% 95% 99%   Weight:       Height:         Body mass index is 25.81 kg/m².  /71   Pulse 91   Temp 36.5 °C (97.7 °F) (Temporal)   Resp 18   Ht 1.727 m (5' 8\")   Wt 77 kg (169 lb 12.1 oz)   SpO2 99%   Breastfeeding? No   BMI 25.81 kg/m²   O2 therapy: Pulse Oximetry: 99 %, O2 (LPM): 0, FiO2%: 21 %, O2 Delivery: None (Room Air)    Physical Exam   Constitutional: She is oriented to person, place, and time and well-developed, well-nourished, and in no distress. No distress.   Neck: No JVD present. No tracheal deviation present.   Cardiovascular: Normal rate.    No murmur heard.  Pulmonary/Chest: No respiratory distress. She has no wheezes.   Abdominal: She exhibits no distension. There is no tenderness.   Musculoskeletal: She exhibits no edema.   Neurological: She is alert and oriented to person, place, and time. She is not agitated. She displays normal reflexes. No cranial nerve deficit or sensory deficit. She displays no Babinski's sign on the right side. She displays no Babinski's sign on the left side.   "   Reflex Scores:       Tricep reflexes are 2+ on the right side and 2+ on the left side.       Bicep reflexes are 2+ on the right side and 2+ on the left side.       Brachioradialis reflexes are 2+ on the right side and 2+ on the left side.       Patellar reflexes are 2+ on the right side and 2+ on the left side.       Achilles reflexes are 2+ on the right side and 2+ on the left side.  Slight dilation on the R pupils and slow reaction to the light.    Skin: No rash noted. No erythema.         Data Review       Lab Data Review:  Recent Results (from the past 24 hour(s))   URINALYSIS CULTURE, IF INDICATED    Collection Time: 03/19/19  4:45 PM   Result Value Ref Range    Color Yellow     Character Clear     Specific Gravity 1.014 <1.035    Ph 5.5 5.0 - 8.0    Glucose Negative Negative mg/dL    Ketones 40 (A) Negative mg/dL    Protein Negative Negative mg/dL    Bilirubin Negative Negative    Urobilinogen, Urine 0.2 Negative    Nitrite Negative Negative    Leukocyte Esterase Negative Negative    Occult Blood Negative Negative    Micro Urine Req see below    CBC without Differential    Collection Time: 03/20/19  2:31 AM   Result Value Ref Range    WBC 8.4 4.8 - 10.8 K/uL    RBC 4.15 (L) 4.20 - 5.40 M/uL    Hemoglobin 12.3 12.0 - 16.0 g/dL    Hematocrit 36.5 (L) 37.0 - 47.0 %    MCV 88.0 81.4 - 97.8 fL    MCH 29.6 27.0 - 33.0 pg    MCHC 33.7 33.6 - 35.0 g/dL    RDW 40.4 35.9 - 50.0 fL    Platelet Count 312 164 - 446 K/uL    MPV 11.0 9.0 - 12.9 fL   Basic Metabolic Panel (BMP)    Collection Time: 03/20/19  2:31 AM   Result Value Ref Range    Sodium 138 135 - 145 mmol/L    Potassium 4.3 3.6 - 5.5 mmol/L    Chloride 107 96 - 112 mmol/L    Co2 23 20 - 33 mmol/L    Glucose 160 (H) 65 - 99 mg/dL    Bun 14 8 - 22 mg/dL    Creatinine 0.76 0.50 - 1.40 mg/dL    Calcium 9.0 8.5 - 10.5 mg/dL    Anion Gap 8.0 0.0 - 11.9   ESTIMATED GFR    Collection Time: 03/20/19  2:31 AM   Result Value Ref Range    GFR If  >60 >60  "mL/min/1.73 m 2    GFR If Non African American >60 >60 mL/min/1.73 m 2       Imaging/Procedures Review:      MR-BRAIN-WITH & W/O   Final Result      There are multiple abnormal T2 hyperintensities in the subcortical and periventricular white matter consistent with demyelinating plaques. Abnormal contrast enhancement is seen in the 2 of the periventricular lesions likely representing \"active\"    demyelinating lesions.      OUTSIDE IMAGES-MR BRAIN   Final Result               Assessment/Plan       43 year old female no significant pmx came with R side headache:    # Possible MS:  - headache and blurry vision.  - no other episodes.  - MRI active periventricular demyelinating lesions.   - Her symptoms come with optic neuritis but with the lesions on her brain most Likely she has MS.   - Her vision is intact on both sides.   - continue high dose of steroid 1 g daily for 3- 5 days depends on her response.   - LP to be done after cervical spine MRI  - oral prednisone after IV.  - follow up with ophthalmologist as outpatient.   - Check Vit D and check for Lym disease.         Quality-Core Measures    "

## 2019-03-21 ENCOUNTER — APPOINTMENT (OUTPATIENT)
Dept: RADIOLOGY | Facility: MEDICAL CENTER | Age: 44
DRG: 103 | End: 2019-03-21
Attending: HOSPITALIST
Payer: COMMERCIAL

## 2019-03-21 ENCOUNTER — APPOINTMENT (OUTPATIENT)
Dept: RADIOLOGY | Facility: MEDICAL CENTER | Age: 44
DRG: 103 | End: 2019-03-21
Attending: PSYCHIATRY & NEUROLOGY
Payer: COMMERCIAL

## 2019-03-21 PROBLEM — E55.9 VITAMIN D DEFICIENCY: Status: ACTIVE | Noted: 2019-03-21

## 2019-03-21 LAB
ANION GAP SERPL CALC-SCNC: 10 MMOL/L (ref 0–11.9)
BASOPHILS # BLD AUTO: 0.3 % (ref 0–1.8)
BASOPHILS # BLD: 0.04 K/UL (ref 0–0.12)
BUN SERPL-MCNC: 16 MG/DL (ref 8–22)
BURR CELLS/RBC NFR CSF MANUAL: 0 %
BURR CELLS/RBC NFR CSF MANUAL: 0 %
CALCIUM SERPL-MCNC: 8.8 MG/DL (ref 8.5–10.5)
CHLORIDE SERPL-SCNC: 110 MMOL/L (ref 96–112)
CLARITY CSF: CLEAR
CLARITY CSF: CLEAR
CO2 SERPL-SCNC: 20 MMOL/L (ref 20–33)
COLOR CSF: COLORLESS
COLOR CSF: COLORLESS
COLOR SPUN CSF: COLORLESS
COLOR SPUN CSF: COLORLESS
CREAT SERPL-MCNC: 0.59 MG/DL (ref 0.5–1.4)
CRP SERPL HS-MCNC: 0.19 MG/DL (ref 0–0.75)
EOSINOPHIL # BLD AUTO: 0 K/UL (ref 0–0.51)
EOSINOPHIL NFR BLD: 0 % (ref 0–6.9)
ERYTHROCYTE [DISTWIDTH] IN BLOOD BY AUTOMATED COUNT: 42.5 FL (ref 35.9–50)
GLUCOSE BLD-MCNC: 128 MG/DL (ref 65–99)
GLUCOSE BLD-MCNC: 134 MG/DL (ref 65–99)
GLUCOSE BLD-MCNC: 157 MG/DL (ref 65–99)
GLUCOSE CSF-MCNC: 89 MG/DL (ref 40–80)
GLUCOSE SERPL-MCNC: 146 MG/DL (ref 65–99)
HCT VFR BLD AUTO: 37 % (ref 37–47)
HGB BLD-MCNC: 12.1 G/DL (ref 12–16)
IMM GRANULOCYTES # BLD AUTO: 0.09 K/UL (ref 0–0.11)
IMM GRANULOCYTES NFR BLD AUTO: 0.6 % (ref 0–0.9)
LYMPHOCYTES # BLD AUTO: 0.82 K/UL (ref 1–4.8)
LYMPHOCYTES NFR BLD: 5.7 % (ref 22–41)
LYMPHOCYTES NFR CSF: 84 %
MCH RBC QN AUTO: 29.8 PG (ref 27–33)
MCHC RBC AUTO-ENTMCNC: 32.7 G/DL (ref 33.6–35)
MCV RBC AUTO: 91.1 FL (ref 81.4–97.8)
MONOCYTES # BLD AUTO: 0.36 K/UL (ref 0–0.85)
MONOCYTES NFR BLD AUTO: 2.5 % (ref 0–13.4)
MONONUC CELLS NFR CSF: 16 %
NEUTROPHILS # BLD AUTO: 13.19 K/UL (ref 2–7.15)
NEUTROPHILS NFR BLD: 90.9 % (ref 44–72)
NRBC # BLD AUTO: 0 K/UL
NRBC BLD-RTO: 0 /100 WBC
PLATELET # BLD AUTO: 310 K/UL (ref 164–446)
PMV BLD AUTO: 11.2 FL (ref 9–12.9)
POTASSIUM SERPL-SCNC: 4.5 MMOL/L (ref 3.6–5.5)
PROT CSF-MCNC: 48 MG/DL (ref 15–45)
RBC # BLD AUTO: 4.06 M/UL (ref 4.2–5.4)
RBC # CSF: 3 CELLS/UL
RBC # CSF: 7 CELLS/UL
SODIUM SERPL-SCNC: 140 MMOL/L (ref 135–145)
SPECIMEN VOL CSF: 9 ML
SPECIMEN VOL CSF: 9 ML
TUBE # CSF: 1
TUBE # CSF: 3
WBC # BLD AUTO: 14.5 K/UL (ref 4.8–10.8)
WBC # CSF: 7 CELLS/UL (ref 0–10)
WBC # CSF: 9 CELLS/UL (ref 0–10)

## 2019-03-21 PROCEDURE — 700102 HCHG RX REV CODE 250 W/ 637 OVERRIDE(OP): Performed by: HOSPITALIST

## 2019-03-21 PROCEDURE — A9270 NON-COVERED ITEM OR SERVICE: HCPCS | Performed by: HOSPITALIST

## 2019-03-21 PROCEDURE — 700111 HCHG RX REV CODE 636 W/ 250 OVERRIDE (IP): Performed by: HOSPITALIST

## 2019-03-21 PROCEDURE — 700111 HCHG RX REV CODE 636 W/ 250 OVERRIDE (IP): Performed by: PSYCHIATRY & NEUROLOGY

## 2019-03-21 PROCEDURE — 99232 SBSQ HOSP IP/OBS MODERATE 35: CPT | Performed by: HOSPITALIST

## 2019-03-21 PROCEDURE — 770001 HCHG ROOM/CARE - MED/SURG/GYN PRIV*

## 2019-03-21 PROCEDURE — 86618 LYME DISEASE ANTIBODY: CPT

## 2019-03-21 PROCEDURE — 700105 HCHG RX REV CODE 258: Performed by: HOSPITALIST

## 2019-03-21 PROCEDURE — 80048 BASIC METABOLIC PNL TOTAL CA: CPT

## 2019-03-21 PROCEDURE — 82042 OTHER SOURCE ALBUMIN QUAN EA: CPT

## 2019-03-21 PROCEDURE — 009U3ZX DRAINAGE OF SPINAL CANAL, PERCUTANEOUS APPROACH, DIAGNOSTIC: ICD-10-PCS | Performed by: RADIOLOGY

## 2019-03-21 PROCEDURE — 72156 MRI NECK SPINE W/O & W/DYE: CPT

## 2019-03-21 PROCEDURE — 84157 ASSAY OF PROTEIN OTHER: CPT

## 2019-03-21 PROCEDURE — 36415 COLL VENOUS BLD VENIPUNCTURE: CPT

## 2019-03-21 PROCEDURE — 700105 HCHG RX REV CODE 258: Performed by: PSYCHIATRY & NEUROLOGY

## 2019-03-21 PROCEDURE — 83916 OLIGOCLONAL BANDS: CPT

## 2019-03-21 PROCEDURE — 82784 ASSAY IGA/IGD/IGG/IGM EACH: CPT

## 2019-03-21 PROCEDURE — 82040 ASSAY OF SERUM ALBUMIN: CPT

## 2019-03-21 PROCEDURE — 700117 HCHG RX CONTRAST REV CODE 255: Performed by: PSYCHIATRY & NEUROLOGY

## 2019-03-21 PROCEDURE — 82945 GLUCOSE OTHER FLUID: CPT

## 2019-03-21 PROCEDURE — 85025 COMPLETE CBC W/AUTO DIFF WBC: CPT

## 2019-03-21 PROCEDURE — 62270 DX LMBR SPI PNXR: CPT

## 2019-03-21 PROCEDURE — 82962 GLUCOSE BLOOD TEST: CPT

## 2019-03-21 PROCEDURE — B01B1ZZ FLUOROSCOPY OF SPINAL CORD USING LOW OSMOLAR CONTRAST: ICD-10-PCS | Performed by: RADIOLOGY

## 2019-03-21 PROCEDURE — A9585 GADOBUTROL INJECTION: HCPCS | Performed by: PSYCHIATRY & NEUROLOGY

## 2019-03-21 PROCEDURE — 99232 SBSQ HOSP IP/OBS MODERATE 35: CPT | Performed by: PSYCHIATRY & NEUROLOGY

## 2019-03-21 PROCEDURE — 89051 BODY FLUID CELL COUNT: CPT

## 2019-03-21 RX ORDER — DIPHENHYDRAMINE HYDROCHLORIDE 50 MG/ML
25 INJECTION INTRAMUSCULAR; INTRAVENOUS ONCE
Status: COMPLETED | OUTPATIENT
Start: 2019-03-21 | End: 2019-03-21

## 2019-03-21 RX ORDER — IBUPROFEN 200 MG
200-600 TABLET ORAL EVERY 8 HOURS PRN
Status: DISCONTINUED | OUTPATIENT
Start: 2019-03-22 | End: 2019-03-22 | Stop reason: HOSPADM

## 2019-03-21 RX ORDER — KETOROLAC TROMETHAMINE 30 MG/ML
30 INJECTION, SOLUTION INTRAMUSCULAR; INTRAVENOUS EVERY 6 HOURS PRN
Status: DISCONTINUED | OUTPATIENT
Start: 2019-03-21 | End: 2019-03-22 | Stop reason: HOSPADM

## 2019-03-21 RX ORDER — GADOBUTROL 604.72 MG/ML
7.5 INJECTION INTRAVENOUS ONCE
Status: COMPLETED | OUTPATIENT
Start: 2019-03-21 | End: 2019-03-21

## 2019-03-21 RX ORDER — SODIUM CHLORIDE 9 MG/ML
INJECTION, SOLUTION INTRAVENOUS CONTINUOUS
Status: DISPENSED | OUTPATIENT
Start: 2019-03-21 | End: 2019-03-22

## 2019-03-21 RX ADMIN — OXYCODONE HYDROCHLORIDE 5 MG: 5 TABLET ORAL at 15:06

## 2019-03-21 RX ADMIN — ACETAMINOPHEN 650 MG: 325 TABLET, FILM COATED ORAL at 15:06

## 2019-03-21 RX ADMIN — OXYCODONE HYDROCHLORIDE 5 MG: 5 TABLET ORAL at 04:27

## 2019-03-21 RX ADMIN — ANTACID TABLETS 500 MG: 500 TABLET, CHEWABLE ORAL at 13:33

## 2019-03-21 RX ADMIN — KETOROLAC TROMETHAMINE 30 MG: 30 INJECTION, SOLUTION INTRAMUSCULAR; INTRAVENOUS at 12:56

## 2019-03-21 RX ADMIN — MORPHINE SULFATE 2 MG: 4 INJECTION INTRAVENOUS at 15:53

## 2019-03-21 RX ADMIN — SENNOSIDES AND DOCUSATE SODIUM 2 TABLET: 8.6; 5 TABLET ORAL at 04:27

## 2019-03-21 RX ADMIN — ANTACID TABLETS 500 MG: 500 TABLET, CHEWABLE ORAL at 21:34

## 2019-03-21 RX ADMIN — OMEPRAZOLE 20 MG: 20 CAPSULE, DELAYED RELEASE ORAL at 04:27

## 2019-03-21 RX ADMIN — ALPRAZOLAM 0.5 MG: 0.5 TABLET ORAL at 12:55

## 2019-03-21 RX ADMIN — ALPRAZOLAM 0.5 MG: 0.5 TABLET ORAL at 07:45

## 2019-03-21 RX ADMIN — OXYCODONE HYDROCHLORIDE 5 MG: 5 TABLET ORAL at 09:30

## 2019-03-21 RX ADMIN — GADOBUTROL 7.5 ML: 604.72 INJECTION INTRAVENOUS at 08:48

## 2019-03-21 RX ADMIN — SODIUM CHLORIDE: 9 INJECTION, SOLUTION INTRAVENOUS at 21:42

## 2019-03-21 RX ADMIN — SODIUM CHLORIDE: 9 INJECTION, SOLUTION INTRAVENOUS at 12:59

## 2019-03-21 RX ADMIN — SENNOSIDES AND DOCUSATE SODIUM 2 TABLET: 8.6; 5 TABLET ORAL at 17:03

## 2019-03-21 RX ADMIN — INSULIN HUMAN 3 UNITS: 100 INJECTION, SOLUTION PARENTERAL at 11:05

## 2019-03-21 RX ADMIN — MAGNESIUM HYDROXIDE 30 ML: 400 SUSPENSION ORAL at 11:00

## 2019-03-21 RX ADMIN — SODIUM CHLORIDE: 9 INJECTION, SOLUTION INTRAVENOUS at 04:27

## 2019-03-21 RX ADMIN — DIPHENHYDRAMINE HYDROCHLORIDE 25 MG: 50 INJECTION INTRAMUSCULAR; INTRAVENOUS at 17:03

## 2019-03-21 RX ADMIN — LIDOCAINE HYDROCHLORIDE 30 ML: 20 SOLUTION OROPHARYNGEAL at 15:06

## 2019-03-21 RX ADMIN — ACETAMINOPHEN 650 MG: 325 TABLET, FILM COATED ORAL at 07:54

## 2019-03-21 RX ADMIN — PROCHLORPERAZINE EDISYLATE 10 MG: 5 INJECTION INTRAMUSCULAR; INTRAVENOUS at 17:05

## 2019-03-21 RX ADMIN — SODIUM CHLORIDE 1000 MG: 9 INJECTION, SOLUTION INTRAVENOUS at 04:28

## 2019-03-21 ASSESSMENT — ENCOUNTER SYMPTOMS
BLURRED VISION: 1
HEADACHES: 1
SHORTNESS OF BREATH: 0
NAUSEA: 0
WEAKNESS: 0
BACK PAIN: 0
VOMITING: 0
DEPRESSION: 0
DIZZINESS: 0
FEVER: 0
NERVOUS/ANXIOUS: 0
SORE THROAT: 0
COUGH: 0
MYALGIAS: 0
DOUBLE VISION: 0

## 2019-03-21 NOTE — PROGRESS NOTES
Pt A/Ox4. Pt reports headache to right side of her head, reports blurred vision in right eye that she states has been present since start of headache several days ago. Pt has IV present and patent, saline locked. No skin issues to note. Call light within reach. No complaints at this time. MRI/Lumbar puncture scheduled for today, pt aware of POC.

## 2019-03-21 NOTE — PROGRESS NOTES
1330- Pt left with transport to LP procedure.  1450 - Pt arrived to floor with transport from LP procedure. Pt educated on need to lay flat x4hrs.

## 2019-03-22 ENCOUNTER — PATIENT OUTREACH (OUTPATIENT)
Dept: HEALTH INFORMATION MANAGEMENT | Facility: OTHER | Age: 44
End: 2019-03-22

## 2019-03-22 VITALS
WEIGHT: 169.75 LBS | DIASTOLIC BLOOD PRESSURE: 73 MMHG | OXYGEN SATURATION: 97 % | TEMPERATURE: 98.5 F | HEART RATE: 77 BPM | RESPIRATION RATE: 18 BRPM | HEIGHT: 68 IN | SYSTOLIC BLOOD PRESSURE: 104 MMHG | BODY MASS INDEX: 25.73 KG/M2

## 2019-03-22 LAB
B BURGDOR AB SER IA-ACNC: 0.17 LIV (ref 0–1.2)
DSDNA AB TITR SER CLIF: NORMAL {TITER}
ENA SM IGG SER-ACNC: 0 AU/ML (ref 0–40)
GLUCOSE BLD-MCNC: 112 MG/DL (ref 65–99)
GLUCOSE BLD-MCNC: 85 MG/DL (ref 65–99)
NUCLEAR IGG SER QL IA: NORMAL

## 2019-03-22 PROCEDURE — A9270 NON-COVERED ITEM OR SERVICE: HCPCS | Performed by: HOSPITALIST

## 2019-03-22 PROCEDURE — 700102 HCHG RX REV CODE 250 W/ 637 OVERRIDE(OP): Performed by: HOSPITALIST

## 2019-03-22 PROCEDURE — 700105 HCHG RX REV CODE 258: Performed by: PSYCHIATRY & NEUROLOGY

## 2019-03-22 PROCEDURE — 82962 GLUCOSE BLOOD TEST: CPT

## 2019-03-22 PROCEDURE — 700111 HCHG RX REV CODE 636 W/ 250 OVERRIDE (IP): Performed by: PSYCHIATRY & NEUROLOGY

## 2019-03-22 PROCEDURE — 700111 HCHG RX REV CODE 636 W/ 250 OVERRIDE (IP): Performed by: HOSPITALIST

## 2019-03-22 PROCEDURE — 99239 HOSP IP/OBS DSCHRG MGMT >30: CPT | Performed by: HOSPITALIST

## 2019-03-22 RX ORDER — OXYCODONE HYDROCHLORIDE 5 MG/1
5 TABLET ORAL EVERY 4 HOURS PRN
Qty: 20 TAB | Refills: 0 | Status: SHIPPED | OUTPATIENT
Start: 2019-03-22 | End: 2019-04-01

## 2019-03-22 RX ORDER — ERGOCALCIFEROL 1.25 MG/1
50000 CAPSULE ORAL
Qty: 8 CAP | Refills: 0 | Status: SHIPPED | OUTPATIENT
Start: 2019-03-27 | End: 2019-05-16

## 2019-03-22 RX ORDER — OMEPRAZOLE 20 MG/1
20 CAPSULE, DELAYED RELEASE ORAL DAILY
Qty: 30 CAP | Refills: 0 | Status: SHIPPED | OUTPATIENT
Start: 2019-03-23 | End: 2019-07-17

## 2019-03-22 RX ORDER — PREDNISONE 20 MG/1
70 TABLET ORAL DAILY
Qty: 39 TAB | Refills: 0 | Status: SHIPPED | OUTPATIENT
Start: 2019-03-22 | End: 2019-03-22

## 2019-03-22 RX ORDER — PREDNISONE 20 MG/1
70 TABLET ORAL DAILY
Qty: 46 TAB | Refills: 0 | Status: SHIPPED | OUTPATIENT
Start: 2019-03-22 | End: 2019-03-26

## 2019-03-22 RX ORDER — ALPRAZOLAM 0.5 MG/1
0.5 TABLET ORAL 3 TIMES DAILY PRN
Qty: 30 TAB | Refills: 0 | Status: SHIPPED | OUTPATIENT
Start: 2019-03-22 | End: 2019-04-21

## 2019-03-22 RX ADMIN — ANTACID TABLETS 500 MG: 500 TABLET, CHEWABLE ORAL at 00:17

## 2019-03-22 RX ADMIN — ACETAMINOPHEN 650 MG: 325 TABLET, FILM COATED ORAL at 13:31

## 2019-03-22 RX ADMIN — MAGNESIUM HYDROXIDE 30 ML: 400 SUSPENSION ORAL at 07:28

## 2019-03-22 RX ADMIN — SODIUM CHLORIDE 1000 MG: 9 INJECTION, SOLUTION INTRAVENOUS at 09:48

## 2019-03-22 RX ADMIN — OMEPRAZOLE 20 MG: 20 CAPSULE, DELAYED RELEASE ORAL at 06:23

## 2019-03-22 RX ADMIN — KETOROLAC TROMETHAMINE 30 MG: 30 INJECTION, SOLUTION INTRAMUSCULAR; INTRAVENOUS at 07:29

## 2019-03-22 RX ADMIN — KETOROLAC TROMETHAMINE 30 MG: 30 INJECTION, SOLUTION INTRAMUSCULAR; INTRAVENOUS at 15:48

## 2019-03-22 RX ADMIN — OXYCODONE HYDROCHLORIDE 5 MG: 5 TABLET ORAL at 13:31

## 2019-03-22 RX ADMIN — SENNOSIDES AND DOCUSATE SODIUM 2 TABLET: 8.6; 5 TABLET ORAL at 06:00

## 2019-03-22 NOTE — PROGRESS NOTES
RN MOBILITY NOTE     Surgery patient?: no  Date of surgery: n/a  Ambulated 50 ft on day of surgery? (N/A if today is not date of surgery): n/a  Number of times ambulated 50 feet or greater today: 5  Patient has been up to chair, edge of bed or HOB 90 degrees for all meals?: yes  Goal met? (goal is ambulating at least 50 feet 2 times on day shift, one time on night shift): yes  If patient did not meet mobility goal, why?: n/a

## 2019-03-22 NOTE — PROGRESS NOTES
Pt A/ox4. Denies numbness/tingling. Reports improved blurred vision to right eye.Reporting headache on the right/left anterior sides of her head, as opposed to just the right side. Pt has IV present and patent. No further complaints at this time. Family at bedside.

## 2019-03-22 NOTE — DISCHARGE PLANNING
Anticipated Discharge Disposition:   Home , MD recommends [facility] for IV steroid x2 days.     Action:   Received a written prescription from Dr. Little for the IV steroid.   Called [phone] for [facility]. Erika from [facility] stated that they cannot accept an order from the hospitalist because if there is a reaction then they will not be able to get a hold of the hospitalist. It has to be an outpt MD or PCP.    Called Dr. Terrell Sanders's office but no answer so  faxed a copy of the IV steroid order and asked if Dr. Terrell Sanders can cosign order.    Barriers to Discharge:   Pending [facility] set up.    Plan:   Update Dr. Little if [facility] can accomodate pt.   Will wait for Dr. Terrell Sanders's office to respond to request.

## 2019-03-22 NOTE — PROGRESS NOTES
"NEUROLOGY CONSULT PROGRESS NOTE:    Requesting Physician: Ana Little M.D.  Admission Date: 3/19/2019    Reason for Consult: optic neuritis, MS suspected.    Subjective:  Pt reports doing better w/ her vision but having migrainous headaches spreading to L side w/ sparkles in both eyes. No new deficits reported.     Physical Exam:  /70   Pulse 70   Temp 36.9 °C (98.4 °F) (Temporal)   Resp 17   Ht 1.727 m (5' 8\")   Wt 77 kg (169 lb 12.1 oz)   SpO2 99%   Breastfeeding? No   BMI 25.81 kg/m²     1. GEN: comfortable, in NAD.   2. EYES: Pupils see below, non-icteric sclerae, moist conjunctivae; no lid-lag.  3. NECK: Trachea midline, supple.   4. EXT: pedal pulses 2+ bilaterally, no digital cyanosis.   5. PSYCH: normal mood and affect, oriented to self, place, persons & time.     6. Neurological Examination:  1. Higher Functions: alert and oriented to self, place, time, person, normal language, speech, prosody and voice. Intact short and long term memories. Intact simple & complex calculations. No left to right confusion.  2. Cranial Nerves:  CN I: Not examined  CN II: normal acuity 20/20 even in OD and normal fields to confrontation  CN III, IV, VI: EOMI; no nystagmus; pupils 4mm->2mm bilaterally to light direct & consensual and accommodation. +RAPD due sluggish reflex in OD  CN V: sensation intact bilaterally  CN VII: face symmetric  CN VIII: hearing intact to finger rub bilaterally  CN IX, X: palate elevates symmetrically  CN XI: SCMs & trapezii 5/5 bilaterally  CN XII: tongue protrudes midline  3. Motor: normal muscle bulks, normal tones, 5/5 symmetric strength in b/l UEs and LEs.  4. Sensory: intact to pinprick and temp, light touch, vibration and proprioception of big toes.  5. Coordination/Gait: normal finger to nose and heel to shin test. Normal rapid fingers and foot motions.   6. Involuntary Movements/Tremor: none  7. Reflexes: 2/2 symmetric throughout, no cross adduction, No Babinski. No clonus. " No Martins.  8. Gait: deferred      Labs: personally reviewed    Recent Labs      03/19/19   1445  03/20/19   0231  03/21/19   0232   SODIUM  138  138  140   POTASSIUM  4.1  4.3  4.5   CHLORIDE  104  107  110   CO2  23  23  20   GLUCOSE  109*  160*  146*   BUN  11  14  16     Radiology/Reports: independently reviewed by me (see below).    ASSESSMENT:  43 yr old lady with anxiety disorder only admitted on 3/19/19 for >1 months of hot flushes in the chest, R eye blurriness, pain on motion, desaturation of red and green and sluggish reflex (RAPD) all indicate optic neuritis and MRI brain showing demyelinating lesions highly suggestive of MS. MRI cervical spine showed no lesion making Neuromyelitis optica unlikely.  CSF collected today. Vitamin D very low. Her R vision improving well w/ steroid but new migraine persisting.     RECOMMENDATIONS:  - ordered IV combination for the migraine headache: IV Compazine 10 mg + IV Benadryl 25 mg.  - continue IV solumedrol 1000 mg for total 5 days. It can be continued as outpatient.  After that she will benefit from Prednisone 70 mg daily for 11 days only. No taper needed since it is a short course.   - Ordered Ergocalciferol 50,000 u q7days. Target D 25OH level >70.  - F/u w/ Dr. Mariah Reid for further eval and tx.   - No further emergent neurological intervention needed.     Neurology signing off. Thank you for the consultation. Please feel free to call back w/ any further questions.    The above was discussed in details with the primary team including Dr. Ana Little M.D.    I have spent 28 minutes total more than half of which was spent reviewing the images and lab results with the patient and the sister present, and answering all questions and explaining in details the assessment and recommendation sections above as well as coordinating the care with the primary team. The patient and the family expressed their understanding and agreement to the above.    Sukhjinder Myers  "Darian Gonzáles MD  Acute Neurology Consultant  Tiger Text ID \"Darian Gonzáles\"    "

## 2019-03-22 NOTE — DISCHARGE PLANNING
Anticipated Discharge Disposition:   Home with help    Action:    Tiger text to Dr. Little regarding coordination for home infusion today or tomorrow. Dr. Little replied and will wait until tomorrow to make decision.  Pt may have to stay for duration of Solumedrol.    Barriers to Discharge:    Headache Pain control    Plan:    Collaborate with medical team

## 2019-03-22 NOTE — DISCHARGE PLANNING
note:  Erika from [facility] said that even if we give them the personal number of Dr. Little she would not be able to accept the order. They need an outpt MD to co sign the order.     Attempted to call Dr. Terrell Sanders's office.     Per Vicky at Dr. Sanders's office, MD will sign the IV steroid prescription.   Ilir said she has available schedules for Saturday and Sunday.     Addendum:  Erika at [facility] gave pt a schedule for tomorrow , Saturday at 345 pm. RN hospitalist Amisha aware and she will let the pt know. Dr. Little aware. CN aware.

## 2019-03-22 NOTE — DISCHARGE INSTRUCTIONS
Discharge Instructions    Discharged to home by car with relative. Discharged via wheelchair, hospital escort: Yes.  Special equipment needed: Not Applicable    Be sure to schedule a follow-up appointment with your primary care doctor or any specialists as instructed.     Discharge Plan:   Diet Plan: Discussed  Activity Level: Discussed  Confirmed Follow up Appointment: Patient to Call and Schedule Appointment  Confirmed Symptoms Management: Discussed  Medication Reconciliation Updated: Yes  Influenza Vaccine Indication: Not indicated: Previously immunized this influenza season and > 8 years of age    I understand that a diet low in cholesterol, fat, and sodium is recommended for good health. Unless I have been given specific instructions below for another diet, I accept this instruction as my diet prescription.   Other diet: n/a    Special Instructions: None    · Is patient discharged on Warfarin / Coumadin? No       Multiple Sclerosis  Multiple sclerosis (MS) is a disease of the central nervous system. It leads to the loss of the insulating covering of the nerves (myelin sheath) of your brain. When this happens, brain signals do not get sent properly or may not get sent at all. The age of onset of MS varies.  What are the causes?  The cause of MS is unknown. However, it is more common in the [place] than in the [place].  What increases the risk?  There is a higher number of women with MS than men. MS is not an illness that is passed down to you from your family members (inherited). However, your risk of MS is higher if you have a relative with MS.  What are the signs or symptoms?  The symptoms of MS occur in episodes or attacks. These attacks may last weeks to months. There may be long periods of almost no symptoms between attacks. The symptoms of MS vary. This is because of the many different ways it affects the central nervous system. The main symptoms of MS include:  · Vision  problems and eye pain.  · Numbness.  · Weakness.  · Inability to move your arms, hands, feet, or legs (paralysis).  · Balance problems.  · Tremors.  How is this diagnosed?  Your health care provider can diagnose MS with the help of imaging exams and lab tests. These may include specialized X-ray exams and spinal fluid tests. The best imaging exam to confirm a diagnosis of MS is an MRI.  How is this treated?  There is no known cure for MS, but there are medicines that can decrease the number and frequency of attacks. Steroids are often used for short-term relief. Physical and occupational therapy may also help. There are also many new alternative or complementary treatments available to help control the symptoms of MS. Ask your health care provider if any of these other options are right for you.  Follow these instructions at home:  · Take medicines as directed by your health care provider.  · Exercise as directed by your health care provider.  Contact a health care provider if:  You begin to feel depressed.  Get help right away if:  · You develop paralysis.  · You have problems with bladder, bowel, or sexual function.  · You develop mental changes, such as forgetfulness or mood swings.  · You have a period of uncontrolled movements (seizure).  This information is not intended to replace advice given to you by your health care provider. Make sure you discuss any questions you have with your health care provider.  Document Released: 12/15/2001 Document Revised: 05/25/2017 Document Reviewed: 08/25/2014  [employer] Patient Education © 2017 [employer]     Alprazolam tablets  What is this medicine?  ALPRAZOLAM (al PRAY sterling el) is a benzodiazepine. It is used to treat anxiety and panic attacks.  This medicine may be used for other purposes; ask your health care provider or pharmacist if you have questions.  COMMON BRAND NAME(S): Xanax  What should I tell my health care provider before I take this  medicine?  They need to know if you have any of these conditions:  -an alcohol or drug abuse problem  -bipolar disorder, depression, psychosis or other mental health conditions  -glaucoma  -kidney or liver disease  -lung or breathing disease  -myasthenia gravis  -Parkinson's disease  -porphyria  -seizures or a history of seizures  -suicidal thoughts  -an unusual or allergic reaction to alprazolam, other benzodiazepines, foods, dyes, or preservatives  -pregnant or trying to get pregnant  -breast-feeding  How should I use this medicine?  Take this medicine by mouth with a glass of water. Follow the directions on the prescription label. Take your medicine at regular intervals. Do not take it more often than directed. Do not stop taking except on your doctor's advice.  A special MedGuide will be given to you by the pharmacist with each prescription and refill. Be sure to read this information carefully each time.  Talk to your pediatrician regarding the use of this medicine in children. Special care may be needed.  Overdosage: If you think you have taken too much of this medicine contact a poison control center or emergency room at once.  NOTE: This medicine is only for you. Do not share this medicine with others.  What if I miss a dose?  If you miss a dose, take it as soon as you can. If it is almost time for your next dose, take only that dose. Do not take double or extra doses.  What may interact with this medicine?  Do not take this medicine with any of the following medications:  -certain antiviral medicines for HIV or AIDS like delavirdine, indinavir  -certain medicines for fungal infections like ketoconazole and itraconazole  -narcotic medicines for cough  -sodium oxybate  This medicine may also interact with the following medications:  -alcohol  -antihistamines for allergy, cough and cold  -certain antibiotics like clarithromycin, erythromycin, isoniazid, rifampin, rifapentine, rifabutin, and  troleandomycin  -certain medicines for blood pressure, heart disease, irregular heart beat  -certain medicines for depression, like amitriptyline, fluoxetine, sertraline  -certain medicines for seizures like carbamazepine, oxcarbazepine, phenobarbital, phenytoin, primidone  -cimetidine  -cyclosporine  -female hormones, like estrogens or progestins and birth control pills, patches, rings, or injections  -general anesthetics like halothane, isoflurane, methoxyflurane, propofol  -grapefruit juice  -local anesthetics like lidocaine, pramoxine, tetracaine  -medicines that relax muscles for surgery  -narcotic medicines for pain  -other antiviral medicines for HIV or AIDS  -phenothiazines like chlorpromazine, mesoridazine, prochlorperazine, thioridazine  This list may not describe all possible interactions. Give your health care provider a list of all the medicines, herbs, non-prescription drugs, or dietary supplements you use. Also tell them if you smoke, drink alcohol, or use illegal drugs. Some items may interact with your medicine.  What should I watch for while using this medicine?  Tell your doctor or health care professional if your symptoms do not start to get better or if they get worse.  Do not stop taking except on your doctor's advice. You may develop a severe reaction. Your doctor will tell you how much medicine to take.  You may get drowsy or dizzy. Do not drive, use machinery, or do anything that needs mental alertness until you know how this medicine affects you. To reduce the risk of dizzy and fainting spells, do not stand or sit up quickly, especially if you are an older patient. Alcohol may increase dizziness and drowsiness. Avoid alcoholic drinks.  If you are taking another medicine that also causes drowsiness, you may have more side effects. Give your health care provider a list of all medicines you use. Your doctor will tell you how much medicine to take. Do not take more medicine than directed. Call  emergency for help if you have problems breathing or unusual sleepiness.  What side effects may I notice from receiving this medicine?  Side effects that you should report to your doctor or health care professional as soon as possible:  -allergic reactions like skin rash, itching or hives, swelling of the face, lips, or tongue  -breathing problems  -confusion  -loss of balance or coordination  -signs and symptoms of low blood pressure like dizziness; feeling faint or lightheaded, falls; unusually weak or tired  -suicidal thoughts or other mood changes  Side effects that usually do not require medical attention (report to your doctor or health care professional if they continue or are bothersome):  -dizziness  -dry mouth  -nausea, vomiting  -tiredness  This list may not describe all possible side effects. Call your doctor for medical advice about side effects. You may report side effects to FDA at 3-153-FDA-8407.  Where should I keep my medicine?  Keep out of the reach of children. This medicine can be abused. Keep your medicine in a safe place to protect it from theft. Do not share this medicine with anyone. Selling or giving away this medicine is dangerous and against the law.  Store at room temperature between 20 and 25 degrees C (68 and 77 degrees F). This medicine may cause accidental overdose and death if taken by other adults, children, or pets. Mix any unused medicine with a substance like cat litter or coffee grounds. Then throw the medicine away in a sealed container like a sealed bag or a coffee can with a lid. Do not use the medicine after the expiration date.  NOTE: This sheet is a summary. It may not cover all possible information. If you have questions about this medicine, talk to your doctor, pharmacist, or health care provider.  © 2018 [employer] (2016-09-15 13:47:25)    Methylprednisolone Suspension for Injection  What is this medicine?  METHYLPREDNISOLONE (meth ill pred NISS oh lone) is  a corticosteroid. It is commonly used to treat inflammation of the skin, joints, lungs, and other organs. Common conditions treated include asthma, allergies, and arthritis. It is also used for other conditions, such as blood disorders and diseases of the adrenal glands.  This medicine may be used for other purposes; ask your health care provider or pharmacist if you have questions.  COMMON BRAND NAME(S): Depmedalone-40, Depmedalone-80, Depo-Medrol  What should I tell my health care provider before I take this medicine?  They need to know if you have any of these conditions:  -Cushing's syndrome  -eye disease, vision problems  -diabetes  -glaucoma  -heart disease  -high blood pressure  -infection (especially a virus infection such as chickenpox, cold sores, or herpes)  -liver disease  -mental illness  -myasthenia gravis  -osteoporosis  -recently received or scheduled to receive a vaccine  -seizures  -stomach or intestine problems  -thyroid disease  -an unusual or allergic reaction to lactose, methylprednisolone, other medicines, foods, dyes, or preservatives  -pregnant or trying to get pregnant  -breast-feeding  How should I use this medicine?  This medicine is for injection into a muscle, joint, or other tissue. It is given by a health care professional in a hospital or clinic setting.  Talk to your pediatrician regarding the use of this medicine in children. While this drug may be prescribed for selected conditions, precautions do apply.  Overdosage: If you think you have taken too much of this medicine contact a poison control center or emergency room at once.  NOTE: This medicine is only for you. Do not share this medicine with others.  What if I miss a dose?  This does not apply.  What may interact with this medicine?  Do not take this medicine with any of the following medications:  -alefacept  -echinacea  -iopamidol  -live virus vaccines  -metyrapone  -mifepristone  This medicine may also interact with the  following medications:  -amphotericin B  -aspirin and aspirin-like medicines  -certain antibiotics like erythromycin, clarithromycin, troleandomycin  -certain medicines for diabetes  -certain medicines for fungal infections like ketoconazole  -certain medicines for seizures like carbamazepine, phenobarbital, phenytoin  -certain medicines that treat or prevent blood clots like warfarin  -cyclosporine  -digoxin  -diuretics  -female hormones, like estrogens and birth control pills  -isoniazid  -NSAIDs, medicines for pain inflammation, like ibuprofen or naproxen  -other medicines for myasthenia gravis  -rifampin  -vaccines  This list may not describe all possible interactions. Give your health care provider a list of all the medicines, herbs, non-prescription drugs, or dietary supplements you use. Also tell them if you smoke, drink alcohol, or use illegal drugs. Some items may interact with your medicine.  What should I watch for while using this medicine?  Tell your doctor or healthcare professional if your symptoms do not start to get better or if they get worse. Do not stop taking except on your doctor's advice. You may develop a severe reaction. Your doctor will tell you how much medicine to take.  Your condition will be monitored carefully while you are receiving this medicine.  This medicine may increase your risk of getting an infection. Tell your doctor or health care professional if you are around anyone with measles or chickenpox, or if you develop sores or blisters that do not heal properly.  This medicine may affect blood sugar levels. If you have diabetes, check with your doctor or health care professional before you change your diet or the dose of your diabetic medicine.  Tell your doctor or health care professional right away if you have any change in your eyesight.  Using this medicine for a long time may increase your risk of low bone mass. Talk to your doctor about bone health.  What side effects may I  notice from receiving this medicine?  Side effects that you should report to your doctor or health care professional as soon as possible:  -allergic reactions like skin rash, itching or hives, swelling of the face, lips, or tongue  -bloody or tarry stools  -changes in vision  -hallucination, loss of contact with reality  -muscle cramps  -muscle pain  -palpitations  -signs and symptoms of high blood sugar such as dizziness; dry mouth; dry skin; fruity breath; nausea; stomach pain; increased hunger or thirst; increased urination  -signs and symptoms of infection like fever or chills; cough; sore throat; pain or trouble passing urine  -trouble passing urine or change in the amount of urine  Side effects that usually do not require medical attention (report to your doctor or health care professional if they continue or are bothersome):  -changes in emotions or mood  -constipation  -diarrhea  -excessive hair growth on the face or body  -headache  -nausea, vomiting  -pain, redness, or irritation at site where injected  -trouble sleeping  -weight gain  This list may not describe all possible side effects. Call your doctor for medical advice about side effects. You may report side effects to FDA at 6-473-FDA-7743.  Where should I keep my medicine?  This drug is given in a hospital or clinic and will not be stored at home.  NOTE: This sheet is a summary. It may not cover all possible information. If you have questions about this medicine, talk to your doctor, pharmacist, or health care provider.  © 2018 [employer] (2017-02-23 16:17:02)    Omeprazole capsules (sprinkle caps) - Rx  What is this medicine?  OMEPRAZOLE (oh ME pray zol) prevents the production of acid in the stomach. It is used to treat gastroesophageal reflux disease (GERD), ulcers, certain bacteria in the stomach, inflammation of the esophagus, and Zollinger-Forrest Syndrome. It is also used to treat other conditions that cause too much stomach  acid.  This medicine may be used for other purposes; ask your health care provider or pharmacist if you have questions.  COMMON BRAND NAME(S): Prilosec  What should I tell my health care provider before I take this medicine?  They need to know if you have any of these conditions:  -liver disease  -low levels of magnesium in the blood  -lupus  -an unusual or allergic reaction to omeprazole, other medicines, foods, dyes, or preservatives  -pregnant or trying to get pregnant  -breast-feeding  How should I use this medicine?  Take this medicine by mouth with a glass of water. Follow the directions on the prescription label. Do not crush, break or chew the capsules. They can be opened and the contents sprinkled on a small amount of applesauce or yogurt, given with fruit juices, or swallowed immediately with water. This medicine works best if taken on an empty stomach 30 to 60 minutes before breakfast. Take your doses at regular intervals. Do not take your medicine more often than directed.  Talk to your pediatrician regarding the use of this medicine in children. Special care may be needed.  Overdosage: If you think you have taken too much of this medicine contact a poison control center or emergency room at once.  NOTE: This medicine is only for you. Do not share this medicine with others.  What if I miss a dose?  If you miss a dose, take it as soon as you can. If it is almost time for your next dose, take only that dose. Do not take double or extra doses.  What may interact with this medicine?  Do not take this medicine with any of the following medications:  -atazanavir  -clopidogrel  -nelfinavir  This medicine may also interact with the following medications:  -ampicillin  -certain medicines for anxiety or sleep  -certain medicines that treat or prevent blood clots like warfarin  -cyclosporine  -diazepam  -digoxin  -disulfiram  -diuretics  -iron salts  -methotrexate  -mycophenolate mofetil  -phenytoin  -prescription  medicine for fungal or yeast infection like itraconazole, ketoconazole, voriconazole  -saquinavir  -tacrolimus  This list may not describe all possible interactions. Give your health care provider a list of all the medicines, herbs, non-prescription drugs, or dietary supplements you use. Also tell them if you smoke, drink alcohol, or use illegal drugs. Some items may interact with your medicine.  What should I watch for while using this medicine?  It can take several days before your stomach pain gets better. Check with your doctor or health care professional if your condition does not start to get better, or if it gets worse.  You may need blood work done while you are taking this medicine.  What side effects may I notice from receiving this medicine?  Side effects that you should report to your doctor or health care professional as soon as possible:  -allergic reactions like skin rash, itching or hives, swelling of the face, lips, or tongue  -bone, muscle or joint pain  -breathing problems  -chest pain or chest tightness  -dark yellow or brown urine  -dizziness  -fast, irregular heartbeat  -feeling faint or lightheaded  -fever or sore throat  -muscle spasm  -palpitations  -rash on cheeks or arms that gets worse in the sun  -redness, blistering, peeling or loosening of the skin, including inside the mouth  -seizures  -tremors  -unusual bleeding or bruising  -unusually weak or tired  -yellowing of the eyes or skin  Side effects that usually do not require medical attention (report to your doctor or health care professional if they continue or are bothersome):  -constipation  -diarrhea  -dry mouth  -headache  -nausea  This list may not describe all possible side effects. Call your doctor for medical advice about side effects. You may report side effects to FDA at 5-114-FDA-5409.  Where should I keep my medicine?  Keep out of the reach of children.  Store at room temperature between 15 and 30 degrees C (59 and 86  degrees F). Protect from light and moisture. Throw away any unused medicine after the expiration date.  NOTE: This sheet is a summary. It may not cover all possible information. If you have questions about this medicine, talk to your doctor, pharmacist, or health care provider.  © 2018 [employer] (2017-01-19 12:18:47)    Prednisone tablets  What is this medicine?  PREDNISONE (PRED ni sone) is a corticosteroid. It is commonly used to treat inflammation of the skin, joints, lungs, and other organs. Common conditions treated include asthma, allergies, and arthritis. It is also used for other conditions, such as blood disorders and diseases of the adrenal glands.  This medicine may be used for other purposes; ask your health care provider or pharmacist if you have questions.  COMMON BRAND NAME(S): Deltasone, Predone, Sterapred, Sterapred DS  What should I tell my health care provider before I take this medicine?  They need to know if you have any of these conditions:  -Cushing's syndrome  -diabetes  -glaucoma  -heart disease  -high blood pressure  -infection (especially a virus infection such as chickenpox, cold sores, or herpes)  -kidney disease  -liver disease  -mental illness  -myasthenia gravis  -osteoporosis  -seizures  -stomach or intestine problems  -thyroid disease  -an unusual or allergic reaction to lactose, prednisone, other medicines, foods, dyes, or preservatives  -pregnant or trying to get pregnant  -breast-feeding  How should I use this medicine?  Take this medicine by mouth with a glass of water. Follow the directions on the prescription label. Take this medicine with food. If you are taking this medicine once a day, take it in the morning. Do not take more medicine than you are told to take. Do not suddenly stop taking your medicine because you may develop a severe reaction. Your doctor will tell you how much medicine to take. If your doctor wants you to stop the medicine, the dose may be  slowly lowered over time to avoid any side effects.  Talk to your pediatrician regarding the use of this medicine in children. Special care may be needed.  Overdosage: If you think you have taken too much of this medicine contact a poison control center or emergency room at once.  NOTE: This medicine is only for you. Do not share this medicine with others.  What if I miss a dose?  If you miss a dose, take it as soon as you can. If it is almost time for your next dose, talk to your doctor or health care professional. You may need to miss a dose or take an extra dose. Do not take double or extra doses without advice.  What may interact with this medicine?  Do not take this medicine with any of the following medications:  -metyrapone  -mifepristone  This medicine may also interact with the following medications:  -aminoglutethimide  -amphotericin B  -aspirin and aspirin-like medicines  -barbiturates  -certain medicines for diabetes, like glipizide or glyburide  -cholestyramine  -cholinesterase inhibitors  -cyclosporine  -digoxin  -diuretics  -ephedrine  -female hormones, like estrogens and birth control pills  -isoniazid  -ketoconazole  -NSAIDS, medicines for pain and inflammation, like ibuprofen or naproxen  -phenytoin  -rifampin  -toxoids  -vaccines  -warfarin  This list may not describe all possible interactions. Give your health care provider a list of all the medicines, herbs, non-prescription drugs, or dietary supplements you use. Also tell them if you smoke, drink alcohol, or use illegal drugs. Some items may interact with your medicine.  What should I watch for while using this medicine?  Visit your doctor or health care professional for regular checks on your progress. If you are taking this medicine over a prolonged period, carry an identification card with your name and address, the type and dose of your medicine, and your doctor's name and address.  This medicine may increase your risk of getting an  infection. Tell your doctor or health care professional if you are around anyone with measles or chickenpox, or if you develop sores or blisters that do not heal properly.  If you are going to have surgery, tell your doctor or health care professional that you have taken this medicine within the last twelve months.  Ask your doctor or health care professional about your diet. You may need to lower the amount of salt you eat.  This medicine may affect blood sugar levels. If you have diabetes, check with your doctor or health care professional before you change your diet or the dose of your diabetic medicine.  What side effects may I notice from receiving this medicine?  Side effects that you should report to your doctor or health care professional as soon as possible:  -allergic reactions like skin rash, itching or hives, swelling of the face, lips, or tongue  -changes in emotions or moods  -changes in vision  -depressed mood  -eye pain  -fever or chills, cough, sore throat, pain or difficulty passing urine  -increased thirst  -swelling of ankles, feet  Side effects that usually do not require medical attention (report to your doctor or health care professional if they continue or are bothersome):  -confusion, excitement, restlessness  -headache  -nausea, vomiting  -skin problems, acne, thin and shiny skin  -trouble sleeping  -weight gain  This list may not describe all possible side effects. Call your doctor for medical advice about side effects. You may report side effects to FDA at 7-712-FDA-7446.  Where should I keep my medicine?  Keep out of the reach of children.  Store at room temperature between 15 and 30 degrees C (59 and 86 degrees F). Protect from light. Keep container tightly closed. Throw away any unused medicine after the expiration date.  NOTE: This sheet is a summary. It may not cover all possible information. If you have questions about this medicine, talk to your doctor, pharmacist, or health care  provider.  © 2018 [employer]/Gold Standard (2012-08-02 10:57:14)    Vitamin D Deficiency  Introduction  Vitamin D deficiency is when your body does not have enough vitamin D. Vitamin D is important because:  · It helps your body use other minerals that your body needs.  · It helps keep your bones strong and healthy.  · It may help to prevent some diseases.  · It helps your heart and other muscles work well.  You can get vitamin D by:  · Eating foods with vitamin D in them.  · Drinking or eating milk or other foods that have had vitamin D added to them.  · Taking a vitamin D supplement.  · Being in the sun.  Not getting enough vitamin D can make your bones become soft. It can also cause other health problems.  Follow these instructions at home:  · Take medicines and supplements only as told by your doctor.  · Eat foods that have vitamin D. These include:  ¨ Dairy products, cereals, or juices with added vitamin D. Check the label for vitamin D.  ¨ Fatty fish like salmon or trout.  ¨ Eggs.  ¨ Oysters.  · Do not use tanning beds.  · Stay at a healthy weight. Lose weight, if needed.  · Keep all follow-up visits as told by your doctor. This is important.  Contact a doctor if:  · Your symptoms do not go away.  · You feel sick to your stomach (nauseous).  · You throw up (vomit).  · You poop less often than usual or you have trouble pooping (constipation).  This information is not intended to replace advice given to you by your health care provider. Make sure you discuss any questions you have with your health care provider.  Document Released: 12/06/2012 Document Revised: 05/25/2017 Document Reviewed: 05/04/2016  © 2017 [employer]    Depression / Suicide Risk    As you are discharged from this [facility] facility, it is important to learn how to keep safe from harming yourself.    Recognize the warning signs:  · Abrupt changes in personality, positive or negative- including increase in energy   · Giving away  possessions  · Change in eating patterns- significant weight changes-  positive or negative  · Change in sleeping patterns- unable to sleep or sleeping all the time   · Unwillingness or inability to communicate  · Depression  · Unusual sadness, discouragement and loneliness  · Talk of wanting to die  · Neglect of personal appearance   · Rebelliousness- reckless behavior  · Withdrawal from people/activities they love  · Confusion- inability to concentrate     If you or a loved one observes any of these behaviors or has concerns about self-harm, here's what you can do:  · Talk about it- your feelings and reasons for harming yourself  · Remove any means that you might use to hurt yourself (examples: pills, rope, extension cords, firearm)  · Get professional help from the community (Mental Health, Substance Abuse, psychological counseling)  · Do not be alone:Call your Safe Contact- someone whom you trust who will be there for you.  · Call your local CRISIS HOTLINE 142-6045 or 070-718-7766  · Call your local Children's Mobile Crisis Response Team [place] (744) 997-3556 or www.appsFreedom  · Call the toll free National Suicide Prevention Hotlines   · National Suicide Prevention Lifeline 928-266-QUCH (0903)  · National Hope Line Network 800-SUICIDE (717-7957)

## 2019-03-22 NOTE — PROGRESS NOTES
Discharge instructions reviewed. Encouraged and answered all questions. Discharge to home with family. No needs.

## 2019-03-23 ENCOUNTER — OUTPATIENT INFUSION SERVICES (OUTPATIENT)
Dept: ONCOLOGY | Facility: MEDICAL CENTER | Age: 44
End: 2019-03-23
Attending: FAMILY MEDICINE
Payer: COMMERCIAL

## 2019-03-23 VITALS
HEART RATE: 62 BPM | DIASTOLIC BLOOD PRESSURE: 74 MMHG | OXYGEN SATURATION: 100 % | HEIGHT: 67 IN | RESPIRATION RATE: 18 BRPM | WEIGHT: 179.68 LBS | BODY MASS INDEX: 28.2 KG/M2 | TEMPERATURE: 97.8 F | SYSTOLIC BLOOD PRESSURE: 114 MMHG

## 2019-03-23 PROBLEM — G44.52 NEW DAILY PERSISTENT HEADACHE: Status: ACTIVE | Noted: 2019-03-19

## 2019-03-23 LAB — B BURGDOR AB CSF IA-ACNC: 0.06 LIV

## 2019-03-23 PROCEDURE — 700105 HCHG RX REV CODE 258: Performed by: HOSPITALIST

## 2019-03-23 PROCEDURE — 700111 HCHG RX REV CODE 636 W/ 250 OVERRIDE (IP): Performed by: HOSPITALIST

## 2019-03-23 PROCEDURE — 96365 THER/PROPH/DIAG IV INF INIT: CPT

## 2019-03-23 RX ORDER — PREGABALIN 50 MG/1
50 CAPSULE ORAL DAILY
Qty: 14 CAP | Refills: 0 | Status: SHIPPED | OUTPATIENT
Start: 2019-03-23 | End: 2019-04-06

## 2019-03-23 RX ORDER — ONDANSETRON 4 MG/1
4 TABLET, ORALLY DISINTEGRATING ORAL EVERY 6 HOURS PRN
Qty: 20 TAB | Refills: 0 | Status: SHIPPED | OUTPATIENT
Start: 2019-03-23 | End: 2019-08-07

## 2019-03-23 RX ADMIN — SODIUM CHLORIDE 1000 MG: 9 INJECTION, SOLUTION INTRAVENOUS at 15:56

## 2019-03-23 NOTE — PROGRESS NOTES
Patient arrived ambulatory to the [facility] for dose 1/2 of Solumedrol outpatient. Reviewed vital signs, labs, and physician order. Patient denies S&S of infection, or bleeding. IV access established in right AC, visualized brisk blood return. Solumedrol administered, no adverse reaction observed. IV flushed per protocol, catheter removed with tip intact, gauze and coban dressing placed. Confirmed upcoming appointment time on 3/24/19 with patient. Patient left the [facility] ambulatory in no sign of distress.

## 2019-03-23 NOTE — DISCHARGE SUMMARY
Discharge Summary    CHIEF COMPLAINT ON ADMISSION  Chief Complaint   Patient presents with   • Headache   • Blurred Vision       Reason for Admission  HEADACHE      Admission Date  3/19/2019    CODE STATUS  Prior    HPI & HOSPITAL COURSE    Patient is a 43-year-old female with a history of anxiety presented to the blurry vision.  Her symptoms have been ongoing and persistent but fluctuating in intensity for approximately 1 month.  She has seen an optometrist and was told her vision is normal.  She had an outpatient MRI which showed concerning findings for multiple sclerosis.  She presented to the hospital for further evaluation.  MRI with and without contrast is consistent with classic multiple sclerosis.  Neurology was consulted.  MRI of her cervical spine has been ordered.  She will need a lumbar puncture after the MRI.  Studies have been ordered as well.  She is on high-dose steroids for 5 days and then will need to be on prednisone for another 11 days.     MRI of her brain as well as cervical spine were obtained.  MRI of her brain did show demyelinating lesions suggestive of multiple sclerosis.  MRI of her cervical spine was unremarkable.  Patient was seen by interventional radiology and had a lumbar puncture.  At the time of discharge her CSF studies were still pending.    She was also found to have vitamin D deficiency was started on vitamin D 50,000 units weekly.  Her target vitamin D levels are greater than 70.    At the time of discharge she continues to have a migraine type headache and complains of intermittent vision issues.  She will be discharged home with outpatient follow-up with neurology Dr. Reid and with her PCP.  She will need 2 more days of IV Solu-Medrol to be done at the infusion center.  Following this she will need 11 days of prednisone 70 mg daily.  She does not need a taper.  There were no other significant events during the hospitalization per    Therefore, she is discharged in good and  stable condition to home with close outpatient follow-up.    The patient met 2-midnight criteria for an inpatient stay at the time of discharge.    Discharge Date  3/22/2019    FOLLOW UP ITEMS POST DISCHARGE  Follow-up CSF studies with PCP or neurology    DISCHARGE DIAGNOSES  Principal Problem:    Multiple sclerosis (HCC) POA: Yes  Active Problems:    Hiatal hernia with GERD POA: Yes      Overview: S/p gastric sleeve 2013    Anxiety POA: Yes      Overview: Resultant social smoking    Smoker POA: Yes    Headache POA: Yes    Vitamin D deficiency POA: Yes  Resolved Problems:    * No resolved hospital problems. *      FOLLOW UP  Future Appointments  Date Time Provider Department Center   3/23/2019 3:45 PM [facility]   [facility]2019 [facility]   3/26/2019 11:00 AM Terrell Sanders D.O. [facility] None     Terrell Sanders D.O.  [address]  [address]  [place] [address]  [phone]          Mariah Reid D.O.  [address]  [place] [address]  [phone]    Call        MEDICATIONS ON DISCHARGE     Medication List      START taking these medications      Instructions   ALPRAZolam 0.5 MG Tabs  Commonly known as:  XANAX   Take 1 Tab by mouth 3 times a day as needed for Anxiety for up to 30 days.  Dose:  0.5 mg     NS SOLN 100 mL with methylPREDNISolone sod succ 1000 MG SOLR 1,000 mg   1,000 mg by Intravenous route every day for 2 days.  Dose:  1 g     omeprazole 20 MG delayed-release capsule  Commonly known as:  PRILOSEC   Take 1 Cap by mouth every day.  Dose:  20 mg     ondansetron 4 MG Tbdp  Commonly known as:  ZOFRAN ODT   Take 1 Tab by mouth every 6 hours as needed for Nausea.  Dose:  4 mg     oxyCODONE immediate-release 5 MG Tabs  Commonly known as:  ROXICODONE   Take 1 Tab by mouth every four hours as needed for Severe Pain for up to 10 days.  Dose:  5 mg     predniSONE 20 MG Tabs  Commonly known as:  DELTASONE   Take 3.5 Tabs by mouth every day for 13  days.  Dose:  70 mg     pregabalin 50 MG capsule  Commonly known as:  LYRICA   Take 1 Cap by mouth every day for 14 days.  Dose:  50 mg     vitamin D (Ergocalciferol) 39961 units Caps capsule  Start taking on:  3/27/2019  Commonly known as:  DRISDOL   Take 1 Cap by mouth every 7 days for 8 doses.  Dose:  97292 Units        CONTINUE taking these medications      Instructions   ibuprofen 200 MG Tabs  Commonly known as:  MOTRIN   Take 200-600 mg by mouth every 8 hours as needed.  Dose:  200-600 mg     NON SPECIFIED   Take 1 Tab by mouth every day. [place] Gum  Dose:  1 Tab     NON SPECIFIED   Take 1 Tab by mouth every day. Butter Bur  Dose:  1 Tab        STOP taking these medications    dexamethasone 0.5 MG Tabs  Commonly known as:  DECADRON            Allergies  No Known Allergies    DIET  No orders of the defined types were placed in this encounter.      ACTIVITY  As tolerated.  Weight bearing as tolerated    CONSULTATIONS  Neurology    PROCEDURES  Lumbar puncture    LABORATORY  Lab Results   Component Value Date    SODIUM 140 03/21/2019    POTASSIUM 4.5 03/21/2019    CHLORIDE 110 03/21/2019    CO2 20 03/21/2019    GLUCOSE 146 (H) 03/21/2019    BUN 16 03/21/2019    CREATININE 0.59 03/21/2019        Lab Results   Component Value Date    WBC 14.5 (H) 03/21/2019    HEMOGLOBIN 12.1 03/21/2019    HEMATOCRIT 37.0 03/21/2019    PLATELETCT 310 03/21/2019        Total time of the discharge process exceeds 42 minutes.

## 2019-03-23 NOTE — ASSESSMENT & PLAN NOTE
Patient's right-sided headache with vision peripheral vision has persisted in the last month despite of using butterbur and as needed triptan without any symptom modification.  Patient has not persist to have any other focal weakness or neurologic manifestation.  Patient's ophthalmologist follow-up has not revealed any retinal abnormalities patient did not have any family history of multiple sclerosis and while her sister has a history of migraines but typically was able to be managed by the triptan.

## 2019-03-24 ENCOUNTER — OUTPATIENT INFUSION SERVICES (OUTPATIENT)
Dept: ONCOLOGY | Facility: MEDICAL CENTER | Age: 44
End: 2019-03-24
Attending: FAMILY MEDICINE
Payer: COMMERCIAL

## 2019-03-24 VITALS
SYSTOLIC BLOOD PRESSURE: 125 MMHG | DIASTOLIC BLOOD PRESSURE: 77 MMHG | WEIGHT: 177.25 LBS | RESPIRATION RATE: 18 BRPM | BODY MASS INDEX: 27.82 KG/M2 | HEIGHT: 67 IN | HEART RATE: 59 BPM | TEMPERATURE: 98.4 F | OXYGEN SATURATION: 100 %

## 2019-03-24 DIAGNOSIS — G35 MULTIPLE SCLEROSIS (HCC): ICD-10-CM

## 2019-03-24 LAB
OLIGOCLONAL BANDS CSF ELPH-IMP: ABNORMAL
OLIGOCLONAL BANDS CSF IEF: 6 BANDS (ref 0–1)
OLIGOCLONAL BANDS.IT SER+CSF QL: POSITIVE

## 2019-03-24 PROCEDURE — 700111 HCHG RX REV CODE 636 W/ 250 OVERRIDE (IP): Performed by: FAMILY MEDICINE

## 2019-03-24 PROCEDURE — 700105 HCHG RX REV CODE 258: Performed by: FAMILY MEDICINE

## 2019-03-24 PROCEDURE — 96365 THER/PROPH/DIAG IV INF INIT: CPT

## 2019-03-24 RX ADMIN — SODIUM CHLORIDE 1000 MG: 9 INJECTION, SOLUTION INTRAVENOUS at 16:19

## 2019-03-25 NOTE — PROGRESS NOTES
Pt returns to infusion center for day 2/2 Solumedrol infusion.  PIV established, brisk blood return noted.  Pt tolerated infusion without incident over 30 minutes.  Pt with new dx of MS; emotional support provided.  Pt left infusion center ambulatory and in good condition.  Further follow up to be determined by MD's office.

## 2019-03-26 ENCOUNTER — DOCUMENTATION (OUTPATIENT)
Dept: NEUROLOGY | Facility: MEDICAL CENTER | Age: 44
End: 2019-03-26

## 2019-03-26 ENCOUNTER — OFFICE VISIT (OUTPATIENT)
Dept: OTHER | Facility: IMAGING CENTER | Age: 44
End: 2019-03-26
Payer: COMMERCIAL

## 2019-03-26 ENCOUNTER — OFFICE VISIT (OUTPATIENT)
Dept: NEUROLOGY | Facility: MEDICAL CENTER | Age: 44
End: 2019-03-26
Payer: COMMERCIAL

## 2019-03-26 VITALS
SYSTOLIC BLOOD PRESSURE: 122 MMHG | HEART RATE: 65 BPM | RESPIRATION RATE: 16 BRPM | DIASTOLIC BLOOD PRESSURE: 80 MMHG | WEIGHT: 175 LBS | TEMPERATURE: 98.3 F | HEIGHT: 67 IN | OXYGEN SATURATION: 99 % | BODY MASS INDEX: 27.47 KG/M2

## 2019-03-26 VITALS
DIASTOLIC BLOOD PRESSURE: 82 MMHG | SYSTOLIC BLOOD PRESSURE: 118 MMHG | RESPIRATION RATE: 16 BRPM | BODY MASS INDEX: 27.47 KG/M2 | OXYGEN SATURATION: 96 % | HEIGHT: 67 IN | HEART RATE: 85 BPM | TEMPERATURE: 98 F | WEIGHT: 175 LBS

## 2019-03-26 DIAGNOSIS — R51.9 NONINTRACTABLE HEADACHE, UNSPECIFIED CHRONICITY PATTERN, UNSPECIFIED HEADACHE TYPE: ICD-10-CM

## 2019-03-26 DIAGNOSIS — G35 MULTIPLE SCLEROSIS (HCC): ICD-10-CM

## 2019-03-26 DIAGNOSIS — G44.52 NEW DAILY PERSISTENT HEADACHE: ICD-10-CM

## 2019-03-26 DIAGNOSIS — H46.9 RIGHT OPTIC NEURITIS: ICD-10-CM

## 2019-03-26 PROCEDURE — 99205 OFFICE O/P NEW HI 60 MIN: CPT | Performed by: PSYCHIATRY & NEUROLOGY

## 2019-03-26 PROCEDURE — 99214 OFFICE O/P EST MOD 30 MIN: CPT | Performed by: FAMILY MEDICINE

## 2019-03-26 RX ORDER — AMITRIPTYLINE HYDROCHLORIDE 25 MG/1
25 TABLET, FILM COATED ORAL NIGHTLY PRN
Qty: 30 TAB | Refills: 2 | Status: SHIPPED | OUTPATIENT
Start: 2019-03-26 | End: 2019-04-24 | Stop reason: SDUPTHER

## 2019-03-26 RX ORDER — CLEMASTINE FUMARATE 2.68 MG
2 TABLET ORAL 2 TIMES DAILY
Qty: 120 TAB | Refills: 2 | Status: SHIPPED | OUTPATIENT
Start: 2019-03-26 | End: 2019-05-15 | Stop reason: SDUPTHER

## 2019-03-26 NOTE — LETTER
[facility]   [address], [address]  [place] [address]  Phone: [phone]  Fax: [phone]              Daksha Senior  1975    Encounter Date: 3/26/2019    Edith Krause M.D.          PROGRESS NOTE:  Pharmacy called stating that patient's Clemastine fumarate 2.68 mg tab written for 2 tablets twice daily as above recommended dosing.  Patient is following Dr. Mariah Reid for MS.  Dr. Reid provided a prescription today for that dosing.  On chart review of this looks like a new medication.  Dr. Reid is currently unavailable.  Will start patient on medication 2.68 mg tablet 1 tablet twice daily for the next week until Dr. Reid returns.    Edith Krause MD  Neurology - Neurophysiology  [facility]  Office: [phone]            No Recipients

## 2019-03-26 NOTE — PATIENT INSTRUCTIONS
Start freeze-dried skullcap 350 mg twice daily, update through mychart of her symptoms and can consider alpha lipoic acid 1400 mg daily.    Start Saffron 30 mg nightly for the next 10 months.    Use meditaiton and qigong alternating with acupuncture.

## 2019-03-26 NOTE — PROGRESS NOTES
CC: Multiple Sclerosis       HPI:    Daksha Senior is a pleasant 43 y.o. Female with pmhx of gastric sleeve in 2013, and hiatal hernia, who presents today in initial hospital follow from a recent hospitalization where she was diagnosed with right eye optic neuritis and multiple sclerosis. She was seen by Dr. Darian Gonzáles who discussed the diagnosis with her. She is also under the care of primary care provider Terrell Sanders D.O. She is accompanied by her sister to today's visit.    In early March 2019, Ms. Senior developed a headache. She vividly recalls the onset of the headache because she was at an [employer] viewing party and thought perhaps she had too much to drink, but the pain persisted for one month and proved to be refractory to over the counter medications such as Tylenol. The pain was located on the right side of her head, behind her right eye, and felt worse with eye movement. She went to see her optometrist but there were no refraction changes. She noted a grayish, garcia tinge, to her vision in the right eye which was also blurry. She had a retinal scan that was reportedly negative.  She went to see her PCP because of the pain. Dr. Sanders ordered a brain MRI which she completed at [facility] on 3/13/19 which showed supratentorial white matter lesions in a juxtacortical and periventricular location concerning for MS. She had refused gadolinium at that time on the recommendation of the technicians.     She followed up with Dr. Sanders who discussed her MRI results with her and prescribed her oral steroids. The following day, she was looking out the window and noted vision changes. She went to work where she told a coworker and they recommended she go to the ER.     She was admitted to [facility] from 3/18/19- 3/24/19 where she had a repeat brain MRI with gadolinium that showed multiple enhancing lesions as well as a cervical spine MRI which was negative, and a lumbar puncture  which showed the presence of 6 unique oligoclonal bands. She was treated with 3 days of high dose IV Solumedrol with an additional 2 days as an outpatient followed by 70mg of Prednisone which she is still taking as of this morning.    The only previous neurological symptoms she could recall were random muscle twitches. She had been hospitalized in January 2019 because of an episode of  chest pressure and pain. She thought she was having a heart attack and called 911 and came to the ER. She had a full cardiac workup and it was negative.     Her eye pain improved after receiving steroids, but she is still left with a constant headache. She has tried multiple medications including Tylenol, Aleve, Excedrine migraine, Maxalt, Lyrica, and Oxycodone. None of these medications have been effective at treating her head pain. It is mostly concentrated in the right frontal area, but at times spread across to the left side. There is no photophobia, phonophobia, nausea or vomiting associated with the headaches.    MS History:  Diagnosed: March 2019  Lumbar puncture:  Yes - positive for 6 unique oligoclonal bands.  First presentation: Right eye optic neuritis  Disease modifying treatment:    Current: None   Previous: None  Former or other neurologist: Dr. Darian Gonzáles - [facility] ER.  Last attack: March 2019  Last MRI: Brain MRI 3/20/19, C-spine 3/21/19 both through [facility].      ROS:   Constitutional: No fevers or chills.  Eyes: + blurry vision in the right eye.  ENT: No dysphagia or hearing loss.  Respiratory: No cough or shortness of breath.  Cardiovascular: No chest pain or palpitations.  GI: No nausea, vomiting, or diarrhea.  : No urinary incontinence or dysuria.  Musculoskeletal: No joint swelling or arthralgias.  Skin: No skin rashes.  Neuro: + headaches, deniesdizziness, or tremors.  Endocrine: No heat or cold intolerance. No polydipsia or polyuria.  Psych:  + anxiety.  Heme/Lymph: No easy bruising or swollen lymph nodes.  All  other review of systems were reviewed and were negative.     Past Medical History:   Past Medical History:   Diagnosis Date   • GERD (gastroesophageal reflux disease)        Past Surgical History:   Past Surgical History:   Procedure Laterality Date   • GASTRIC BANDING LAPAROSCOPIC     • PRIMARY C SECTION         Social History:   Social History     Social History   • Marital status:      Spouse name: N/A   • Number of children: N/A   • Years of education: N/A     Occupational History   • Not on file.     Social History Main Topics   • Smoking status: Light Tobacco Smoker     Types: Cigarettes   • Smokeless tobacco: Never Used      Comment: socially <1 pack a week.   • Alcohol use 1.2 oz/week     2 Glasses of wine per week   • Drug use: No   • Sexual activity: Yes     Partners: Male     Birth control/ protection: IUD     Other Topics Concern   • Not on file     Social History Narrative   • No narrative on file       Family Hx:   Family History   Problem Relation Age of Onset   • Cancer Father 68        Colon   • Thyroid Mother    • Alcohol/Drug Brother    • Cancer Maternal Grandmother         Breast   • Other Paternal Grandmother         Macular degeneration   • Allergies Son    • No Known Problems Daughter    • No Known Problems Daughter        Current Medications:   Current Outpatient Prescriptions:   •  amitriptyline (ELAVIL) 25 MG Tab, Take 1 Tab by mouth at bedtime as needed for up to 90 days. (Patient not taking: Reported on 3/26/2019), Disp: 30 Tab, Rfl: 2  •  Clemastine Fumarate 2.68 MG Tab, Take 2 Tabs by mouth 2 Times a Day. (Patient not taking: Reported on 3/26/2019), Disp: 120 Tab, Rfl: 2  •  pregabalin (LYRICA) 50 MG capsule, Take 1 Cap by mouth every day for 14 days., Disp: 14 Cap, Rfl: 0  •  [START ON 3/27/2019] vitamin D, Ergocalciferol, (DRISDOL) 59005 units Cap capsule, Take 1 Cap by mouth every 7 days for 8 doses., Disp: 8 Cap, Rfl: 0  •  NON SPECIFIED, Take 1 Tab by mouth every day. [place]  "Gum, Disp: , Rfl:   •  ondansetron (ZOFRAN ODT) 4 MG TABLET DISPERSIBLE, Take 1 Tab by mouth every 6 hours as needed for Nausea., Disp: 20 Tab, Rfl: 0  •  omeprazole (PRILOSEC) 20 MG delayed-release capsule, Take 1 Cap by mouth every day., Disp: 30 Cap, Rfl: 0  •  ALPRAZolam (XANAX) 0.5 MG Tab, Take 1 Tab by mouth 3 times a day as needed for Anxiety for up to 30 days., Disp: 30 Tab, Rfl: 0  •  oxyCODONE immediate-release (ROXICODONE) 5 MG Tab, Take 1 Tab by mouth every four hours as needed for Severe Pain for up to 10 days., Disp: 20 Tab, Rfl: 0  •  NON SPECIFIED, Take 1 Tab by mouth every day. Butter Bur, Disp: , Rfl:   •  ibuprofen (MOTRIN) 200 MG Tab, Take 200-600 mg by mouth every 8 hours as needed., Disp: , Rfl:     Allergies: No Known Allergies      Physical Exam:     Ambulatory Vitals  Encounter Vitals  Temperature: 36.7 °C (98 °F)  Temp src: Temporal  Blood Pressure: 118/82  Pulse: 85  Respiration: 16  Pulse Oximetry: 96 %  Weight: 79.4 kg (175 lb)  Height: 170.2 cm (5' 7\")  BMI (Calculated): 27.41    Constitutional: Well-developed, well-nourished, good hygiene. Appears stated age.  Cardiovascular: RRR, with no murmurs, rubs or gallops. No carotid bruits. No peripheral edema, pedal pulses intact.   Respiratory: Lungs CTA B/L, no W/R/R.   Skin: Warm, dry, intact. No rashes observed.  Eyes: Sclera anicteric.  Neurologic:   Mental Status: Awake, alert, oriented x 3.   Speech: Fluent with normal prosody.   Memory: Able to recall 3 words at 1 minute and 5 minutes. Able to recall recent and remote events accurately.    Concentration: Attentive. Able to focus on history and follow multi-step commands.   Fund of Knowledge: Appropriate.   Cranial Nerves:    CN II: PERRL. No afferent pupillary defect.    CN III, IV, VI: Good eye closure, EOMI.     CN V: Facial sensation intact and symmetric.     CN VII: No facial asymmetry.     CN VIII: Hearing intact.     CN IX and X: Palate elevates symmetrically. Normal gag " reflex.    CN XI: Symmetric shoulder shrug.     CN XII: Tongue midline.    Sensory: Slightly decreased temperature sensation subjectively in the right lower extremity. Vibration sense intact.    Coordination: No evidence of past-pointing on finger to nose testing, no dysdiadochokinesia. Heel to shin intact.    DTR's: 2+ in the biceps, triceps, brachioradialis, 3+ in the knees, 2+ in the ankles without clonus.    Babinski: Toes downgoing bilaterally.   Romberg: Negative.   Movements: No tremors observed.   Musculoskeletal:    Strength: Full strength.   Deltoids      R 5/5 L 5/5  Biceps        R 5/5 L 5/5  Triceps       R 5/5 L 5/5  Wrist Ext    R 5/5 L 5/5  Finger Flex R 5/5 L 5/5  Finger Ext   R 5/5 L 5/5  Hip Flex      R 5/5 L 5/5  Knee Flex   R 5/5 L 5/5  Knee Ext    R 5/5 L 5/5  Ankle DF    R 5/5 L 5/5  Ankle PF    R 5/5 L 5/5   Gait: Steady, narrow based. Difficulty with tandem walking.    Tone: Normal bulk and tone.   Joints: No swelling.     Labs Reviewed:  Results for MONE CAO (MRN [id number]) as of 3/26/2019 08:19   Ref. Range 3/21/2019 13:52 3/21/2019 13:52   Number Of Tubes Unknown 3 3   Volume Latest Units: mL 9.0 9.0   Color-Body Fluid Unknown Colorless Colorless   Character-Body Fluid Unknown Clear Clear   Supernatant Appearance Unknown Colorless Colorless   Total WBC Count Latest Ref Range: 0 - 10 cells/uL 9 7   Total RBC Count Latest Units: cells/uL 3 7   Crenated RBC Latest Units: % 0 0   Lymphs Latest Units: %  84   Mononuclear Cells - CSF Latest Units: %  16   CSF Tube Number Unknown 1 3   Glucose CSF Latest Ref Range: 40 - 80 mg/dL 89 (H)    Total Protein, CSF Latest Ref Range: 15 - 45 mg/dL 48 (H)    Lyme Total Ab Latest Ref Range: <=0.99 MAHOGANY 0.06    Oligoclonal Bands CSF Latest Ref Range: 0 - 1 Bands 6 (H)    Oligoclonal Bands CSF Unknown Positive (A)    Interpretation Unknown See Note    Results for MONE CAO (MRN [id number]) as of 3/26/2019 18:47   Ref. Range 3/20/2019 12:44    25-Hydroxy   Vitamin D 25 Latest Ref Range: 30 - 100 ng/mL 20 (L)   Lyme Disease Abs, Igg Latest Ref Range: 0.00 - 1.20 MAHOGANY 0.17       Imaging:   Today I reviewed the patient's most recent MRI images with them in the examination room. I explained basic terminology of MRI's, verbalized my assessment, and answered their questions.       Assessment/Plan:  Multiple sclerosis (HCC)  Ms. Senior is a 44 yo F with pmhx of GERD who developed right eye painful eye movements with associated blurred vision and visual obscurations, found to have demyelinating lesions both enhancing and non-enhancing on brain MRI imaging, spinal fluid positive for 6 unique oligoclonal bands.     Today I reviewed Ms. Senior's imaging studies with her, both the initial brain MRI without contrast and her brain MRI and c-spine MRI's with/wo contrast done during her hospitalization. I discussed with her that the shape, appearance, and location of the lesions is consistent with a diagnosis of MS. She fulfills the 2017 [place] Criteria based on MRI imaging alone, but spinal fluid is also positive and consistent with the diagnosis.     I recommended she have a thoracic spine MRI w/wo contrast to complete baseline imaging of her neuro-axis. She can discontinue the oral steroids as there is likely no additional benefit to be gained. I have requested she have lab studies completed in preparation of a further discussion regarding treatment options. I did recommend 3 months of clemastine fumarate to help with possible remyelination of her right optic nerve.     Plan:  1. Thoracic spine MRI w/wo contrast  2. Labs ordered: NIMCO Virus antibody through [facility], VZV, Quantiferon Gold TB  3. Clemastine Fumarate 2.68mg, two tabs twice a day for 3 months  4. Referral to Dr. Pio Osman  5. She will return in 3-4 weeks to discuss treatment options      New daily persistent headache  Right frontal constant dull head pain. No features of migraine. Refractory to  Tylenol, Aleve, Lyrica, Oxycodone, steroids, and Maxalt. This is most likely secondary to her optic neuritis and MS. I recommended a trial of Amitryptiline at bedtime for at least 1 week. Side effects were discussed including cardiac arrhythmia and depression.     Plan  1. Trial of Amitryptiline 25mg at bedtime.        Greater than 50% of this 60 minute face to face encounter was devoted to disease state counseling on Multiple Sclerosis and coordination of care. During today's encounter we discussed available treatment options and their individual side effect profiles. Additional time was spent on MRI review with the patient in the exam room during which I provided education on basic radiology terminology and provided my own verbal assessment (see above assessment and plan for further details of discussion). This note was completed using dictation software resulting in occasional speech recognition errors.          Mariah Reid D.O., M.P.H  MS specialist.   Board Certified Neurologist.  Neurology Clerkship Director, [facility].    Neurology,  [facility].   Tel: [phone]  Fax: [phone]

## 2019-03-26 NOTE — PROGRESS NOTES
Chief Complaint   Patient presents with   • Follow-Up     ED stay   • Headache     Subjective:   Daksha Senior is a 43 y.o. female here today for multiple problems as listed below.      Multiple sclerosis (HCC)  Patient had interval change in her visions and with the other neurological symptoms she was seen in the ED and demonstrated MRI of head with contrast reading as active demyelination process.  Fortunately her cervical scan revealed no lesion during her inpatient workup.  The mixed blessing from her perspective is the faster workup has given her more clarity with also CSF fluid analysis.      Ref. Range 3/21/2019 13:52 3/21/2019 13:52   Number Of Tubes Unknown 3 3   Volume Latest Units: mL 9.0 9.0   Color-Body Fluid Unknown Colorless Colorless   Character-Body Fluid Unknown Clear Clear   Supernatant Appearance Unknown Colorless Colorless   Total WBC Count Latest Ref Range: 0 - 10 cells/uL 9 7   Total RBC Count Latest Units: cells/uL 3 7   Crenated RBC Latest Units: % 0 0   Lymphs Latest Units: %   84   Mononuclear Cells - CSF Latest Units: %   16   CSF Tube Number Unknown 1 3   Glucose CSF Latest Ref Range: 40 - 80 mg/dL 89 (H)     Total Protein, CSF Latest Ref Range: 15 - 45 mg/dL 48 (H)     Lyme Total Ab Latest Ref Range: <=0.99 MAHOGANY 0.06     Oligoclonal Bands CSF Latest Ref Range: 0 - 1 Bands 6 (H)     Oligoclonal Bands CSF Unknown Positive (A)     Interpretation Unknown See Note       Her symptoms with headache however was not changed with even the solumedrol use until this morning being discontinued by her Neurologist Dr. Ambrose.    New daily persistent headache  Patient has become more emotional over the course of the later part of hospitalization secondary to the lacking of the improvement from using Solu-Medrol.  Patient during inpatient also has increased cervical tenderness at bilateral posterior medial part of the trapezius area.  Negative for additional vision change but still has the least  "persistent \"glazed over\" sensation especially at the right eye peripheral vision.  There are fortunately no other additional symptoms that she can speak of since that discharge except for the before mentioned neck tenderness.     Current medicines (including changes today)  Current Outpatient Prescriptions   Medication Sig Dispense Refill   • pregabalin (LYRICA) 50 MG capsule Take 1 Cap by mouth every day for 14 days. 14 Cap 0   • [START ON 3/27/2019] vitamin D, Ergocalciferol, (DRISDOL) 62175 units Cap capsule Take 1 Cap by mouth every 7 days for 8 doses. 8 Cap 0   • amitriptyline (ELAVIL) 25 MG Tab Take 1 Tab by mouth at bedtime as needed for up to 90 days. (Patient not taking: Reported on 3/26/2019) 30 Tab 2   • Clemastine Fumarate 2.68 MG Tab Take 2 Tabs by mouth 2 Times a Day. (Patient not taking: Reported on 3/26/2019) 120 Tab 2   • ondansetron (ZOFRAN ODT) 4 MG TABLET DISPERSIBLE Take 1 Tab by mouth every 6 hours as needed for Nausea. 20 Tab 0   • omeprazole (PRILOSEC) 20 MG delayed-release capsule Take 1 Cap by mouth every day. 30 Cap 0   • ALPRAZolam (XANAX) 0.5 MG Tab Take 1 Tab by mouth 3 times a day as needed for Anxiety for up to 30 days. 30 Tab 0   • oxyCODONE immediate-release (ROXICODONE) 5 MG Tab Take 1 Tab by mouth every four hours as needed for Severe Pain for up to 10 days. 20 Tab 0   • NON SPECIFIED Take 1 Tab by mouth every day. [place] Gum     • NON SPECIFIED Take 1 Tab by mouth every day. Butter Bur     • ibuprofen (MOTRIN) 200 MG Tab Take 200-600 mg by mouth every 8 hours as needed.       No current facility-administered medications for this visit.      She  has no past medical history on file.    ROS  No chest pain, no shortness of breath, no abdominal pain, no diarrhea/constipation, no urinary symptoms.     Objective:     Blood pressure 122/80, pulse 65, temperature 36.8 °C (98.3 °F), temperature source Temporal, resp. rate 16, height 1.702 m (5' 7\"), weight 79.4 kg (175 lb), SpO2 99 %, not " currently breastfeeding. Body mass index is 27.41 kg/m².  Physical Exam:  Alert, oriented in no acute distress.  Eye contact is good, speech goal directed, affect calm  HEENT: conjunctiva non-injected, sclera non-icteric.  Pinna normal.   Neck: No adenopathy or masses in the neck or supraclavicular regions.  Lungs: clear to auscultation bilaterally with good excursion.  CV: regular rate and rhythm.  Abdomen: soft, nontender, No CVAT  Ext: no edema, color normal, vascularity normal, temperature normal    Assessment and Plan:   The following treatment plan was discussed  1. Multiple sclerosis (HCC)      Discussed with patient of the possible benefit in using Clemastine 2.68 mg up to three times daily (2 tabs in the evening and 1 tab in the morning) and amitriptyline use at nighttime.  Continue her mastic gum use thousand milligrams daily for the next 12 weeks to assist with her GI symptoms.  Encouraged to continue follow-up with neurology in the next 3-4 weeks, and meantime discussed the possibility of using of alpha lipoic acid 1000 mg daily after insufficient response from skullcap trial or as an adjunct therapy.   2. New daily persistent headache      Discussed with patient of lyrica and amitriptyline.  Also discussed with patient about skullcap and the possible increase levels of drugs metabolized by CY enzymes.   Given the low dose of amitriptyline and dosage of skullcap that we will cautiously observe her symptoms relief.  The sedative property in lyrica could be an issue, but with her minimal sedation with intermittent use of lyrica, caution would be given with the concomitant oral administration at the same time.       Followup: Return in about 5 weeks (around 2019).    Spent 25 minutes in face-to-tace patient contact in which greater than 50% of the visit was spent in counseling and coordination of care including MS management options, Answering patient questions about Headache, Concerns and  potential risks related to Skullcap, Discussing in depth the importance of primary prevention of Motor function loss, Discussing the nature of Headache and Patient is also educated about lifestyle modifications which may improve Multiple sclerosis, daily persistent headache, gastric reflux disease, anxiety, depression.  We also reviewed PMH, family history, and each of her chronic diagnoses in regards to current management, specialty physicians, symptom control, and future planning. Please refer to assessment and plan/discussion/recommendations for additional details.        Please note that dictation has been dictated using voice recognition soft ware. I have made every reasonable attempt to correct obvious errors, but I expect that there are errors of grammar and possibly content that I did not discover before finalizing the note.

## 2019-03-26 NOTE — PROGRESS NOTES
Pharmacy called stating that patient's Clemastine fumarate 2.68 mg tab written for 2 tablets twice daily as above recommended dosing.  Patient is following Dr. Mariah Reid for MS.  Dr. Reid provided a prescription today for that dosing.  On chart review of this looks like a new medication.  Dr. Reid is currently unavailable.  Will start patient on medication 2.68 mg tablet 1 tablet twice daily for the next week until Dr. Reid returns.    Edith Krause MD  Neurology - Neurophysiology  [facility]  Office: [phone]

## 2019-03-27 ENCOUNTER — DOCUMENTATION (OUTPATIENT)
Dept: NEUROLOGY | Facility: MEDICAL CENTER | Age: 44
End: 2019-03-27

## 2019-03-27 LAB
ALB CSF/SERPL: 6.4 RATIO (ref 0–9)
ALBUMIN CSF-MCNC: 22 MG/DL (ref 0–35)
ALBUMIN SERPL-MCNC: 3440 MG/DL (ref 3500–5200)
IGG CSF-MCNC: 10.5 MG/DL (ref 0–6)
IGG SERPL-MCNC: 922 MG/DL (ref 768–1632)
IGG SYNTH RATE SER+CSF CALC-MRATE: 35.6 MG/D
IGG/ALB CLEAR SER+CSF-RTO: 1.78 RATIO (ref 0.28–0.66)
IGG/ALB CSF: 0.48 RATIO (ref 0.09–0.25)

## 2019-03-27 NOTE — ASSESSMENT & PLAN NOTE
Patient had interval change in her visions and with the other neurological symptoms she was seen in the ED and demonstrated MRI of head with contrast reading as active demyelination process.  Fortunately her cervical scan revealed no lesion during her inpatient workup.  The mixed blessing from her perspective is the faster workup has given her more clarity with also CSF fluid analysis.      Ref. Range 3/21/2019 13:52 3/21/2019 13:52   Number Of Tubes Unknown 3 3   Volume Latest Units: mL 9.0 9.0   Color-Body Fluid Unknown Colorless Colorless   Character-Body Fluid Unknown Clear Clear   Supernatant Appearance Unknown Colorless Colorless   Total WBC Count Latest Ref Range: 0 - 10 cells/uL 9 7   Total RBC Count Latest Units: cells/uL 3 7   Crenated RBC Latest Units: % 0 0   Lymphs Latest Units: %   84   Mononuclear Cells - CSF Latest Units: %   16   CSF Tube Number Unknown 1 3   Glucose CSF Latest Ref Range: 40 - 80 mg/dL 89 (H)     Total Protein, CSF Latest Ref Range: 15 - 45 mg/dL 48 (H)     Lyme Total Ab Latest Ref Range: <=0.99 MAHOGANY 0.06     Oligoclonal Bands CSF Latest Ref Range: 0 - 1 Bands 6 (H)     Oligoclonal Bands CSF Unknown Positive (A)     Interpretation Unknown See Note       Her symptoms with headache however was not changed with even the solumedrol use until this morning being discontinued by her Neurologist Dr. Ambrose.

## 2019-03-27 NOTE — ASSESSMENT & PLAN NOTE
Right frontal constant dull head pain. No features of migraine. Refractory to Tylenol, Aleve, Lyrica, Oxycodone, steroids, and Maxalt. This is most likely secondary to her optic neuritis and MS. I recommended a trial of Amitryptiline at bedtime for at least 1 week. Side effects were discussed including cardiac arrhythmia and depression.     Plan  1. Trial of Amitryptiline 25mg at bedtime.

## 2019-03-27 NOTE — ASSESSMENT & PLAN NOTE
"Patient has become more emotional over the course of the later part of hospitalization secondary to the lacking of the improvement from using Solu-Medrol.  Patient during inpatient also has increased cervical tenderness at bilateral posterior medial part of the trapezius area.  Negative for additional vision change but still has the least persistent \"glazed over\" sensation especially at the right eye peripheral vision.  There are fortunately no other additional symptoms that she can speak of since that discharge except for the before mentioned neck tenderness.  "

## 2019-03-27 NOTE — ASSESSMENT & PLAN NOTE
Ms. Senior is a 44 yo F with pmhx of GERD who developed right eye painful eye movements with associated blurred vision and visual obscurations, found to have demyelinating lesions both enhancing and non-enhancing on brain MRI imaging, spinal fluid positive for 6 unique oligoclonal bands.     Today I reviewed Ms. Senior's imaging studies with her, both the initial brain MRI without contrast and her brain MRI and c-spine MRI's with/wo contrast done during her hospitalization. I discussed with her that the shape, appearance, and location of the lesions is consistent with a diagnosis of MS. She fulfills the 2017 Garland Criteria based on MRI imaging alone, but spinal fluid is also positive and consistent with the diagnosis.     I recommended she have a thoracic spine MRI w/wo contrast to complete baseline imaging of her neuro-axis. She can discontinue the oral steroids as there is likely no additional benefit to be gained. I have requested she have lab studies completed in preparation of a further discussion regarding treatment options. I did recommend 3 months of clemastine fumarate to help with possible remyelination of her right optic nerve.     Plan:  1. Thoracic spine MRI w/wo contrast  2. Labs ordered: NIMCO Virus antibody through [facility], VZV, Quantiferon Gold TB  3. Clemastine Fumarate 2.68mg, two tabs twice a day for 3 months  4. Referral to Dr. Pio Osman  5. She will return in 3-4 weeks to discuss treatment options

## 2019-03-27 NOTE — PROGRESS NOTES
Dr. Reid note available and personally reviewed.  She did intend to write for Clemastine Fumarate 2.68mg, two tabs twice a day for 3 months. This is for MS indications and re-myelination. [facility] pharmacy personally contacted - corrected dose to the above instead of one tablet BID. Please see prior note.     Edith Krause

## 2019-04-09 ENCOUNTER — APPOINTMENT (OUTPATIENT)
Dept: OTHER | Facility: IMAGING CENTER | Age: 44
End: 2019-04-09

## 2019-04-24 ENCOUNTER — OFFICE VISIT (OUTPATIENT)
Dept: NEUROLOGY | Facility: MEDICAL CENTER | Age: 44
End: 2019-04-24
Payer: COMMERCIAL

## 2019-04-24 VITALS
WEIGHT: 175 LBS | DIASTOLIC BLOOD PRESSURE: 78 MMHG | TEMPERATURE: 97.6 F | HEART RATE: 90 BPM | SYSTOLIC BLOOD PRESSURE: 120 MMHG | HEIGHT: 67 IN | OXYGEN SATURATION: 100 % | BODY MASS INDEX: 27.47 KG/M2

## 2019-04-24 DIAGNOSIS — G35 MULTIPLE SCLEROSIS (HCC): ICD-10-CM

## 2019-04-24 DIAGNOSIS — R51.9 NONINTRACTABLE HEADACHE, UNSPECIFIED CHRONICITY PATTERN, UNSPECIFIED HEADACHE TYPE: ICD-10-CM

## 2019-04-24 DIAGNOSIS — G44.52 NEW DAILY PERSISTENT HEADACHE: ICD-10-CM

## 2019-04-24 PROCEDURE — 99215 OFFICE O/P EST HI 40 MIN: CPT | Performed by: PSYCHIATRY & NEUROLOGY

## 2019-04-24 RX ORDER — AMITRIPTYLINE HYDROCHLORIDE 25 MG/1
50 TABLET, FILM COATED ORAL NIGHTLY PRN
Qty: 30 TAB | Refills: 2 | Status: SHIPPED | OUTPATIENT
Start: 2019-04-24 | End: 2019-05-15 | Stop reason: SDUPTHER

## 2019-04-24 NOTE — PROGRESS NOTES
CC: Multiple Sclerosis       HPI:    Daksha Senior is a pleasant 43 y.o. Female who presents today for neurological follow-up for relapsing remitting multiple sclerosis.  She is accompanied by her friend to today's visit.    Since her last visit, Ms. Senior had a thoracic spine MRI performed which showed an enhancing lesion at the level of T10.  She denies any lower extremity weakness, dysesthesias, or issues with her bowels or bladder.    She continues to suffer with headaches.  Most days she had been waking up with a headache.  She feels that the intensity of her headaches decreased after starting amitriptyline 25 mg at bedtime.  She denies any adverse effects from the medication, but is still frustrated by the pain.  The pain fluctuates throughout the day.  It feels like a pressure sensation.    She tested positive for the NIMCO virus antibody.  She admits that she has to some extent but in denial regarding her diagnosis.    MS History:  Diagnosed: March 2019  Lumbar puncture:  Yes - positive for 6 unique oligoclonal bands.  First presentation: Right eye optic neuritis  Disease modifying treatment:    Current: None   Previous: None  Former or other neurologist: Dr. Darian Gonzáles - saw in ER.  Last attack: March 2019  Last MRI: Brain MRI 3/20/19, C-spine 3/21/19 both through [facility].      ROS:   Constitutional: No fevers or chills.  Eyes: + blurry vision in the right eye.  Denies eye pain.  ENT: No dysphagia or hearing loss.  Respiratory: No cough or shortness of breath.  Cardiovascular: No chest pain or palpitations.  GI: No nausea, vomiting, or diarrhea.  : No urinary incontinence or dysuria.  Musculoskeletal: No joint swelling or arthralgias.  Skin: No skin rashes.  Neuro: + headaches, denies dizziness, or tremors.  Endocrine: No heat or cold intolerance. No polydipsia or polyuria.  Psych:  + anxiety.  Heme/Lymph: No easy bruising or swollen lymph nodes.  All other review of systems were reviewed and were  negative.     Past Medical History:   Past Medical History:   Diagnosis Date   • GERD (gastroesophageal reflux disease)        Past Surgical History:   Past Surgical History:   Procedure Laterality Date   • GASTRIC BANDING LAPAROSCOPIC     • PRIMARY C SECTION         Social History:   Social History     Social History   • Marital status:      Spouse name: N/A   • Number of children: N/A   • Years of education: N/A     Occupational History   • Not on file.     Social History Main Topics   • Smoking status: Light Tobacco Smoker     Types: Cigarettes   • Smokeless tobacco: Never Used      Comment: socially <1 pack a week.   • Alcohol use 1.2 oz/week     2 Glasses of wine per week   • Drug use: No   • Sexual activity: Yes     Partners: Male     Birth control/ protection: IUD     Other Topics Concern   • Not on file     Social History Narrative   • No narrative on file       Family Hx:   Family History   Problem Relation Age of Onset   • Cancer Father 68        Colon   • Thyroid Mother    • Alcohol/Drug Brother    • Cancer Maternal Grandmother         Breast   • Other Paternal Grandmother         Macular degeneration   • Allergies Son    • No Known Problems Daughter    • No Known Problems Daughter        Current Medications:   Current Outpatient Prescriptions:   •  amitriptyline (ELAVIL) 25 MG Tab, Take 2 Tabs by mouth at bedtime as needed for up to 90 days., Disp: 30 Tab, Rfl: 2  •  Clemastine Fumarate 2.68 MG Tab, Take 2 Tabs by mouth 2 Times a Day., Disp: 120 Tab, Rfl: 2  •  vitamin D, Ergocalciferol, (DRISDOL) 08768 units Cap capsule, Take 1 Cap by mouth every 7 days for 8 doses., Disp: 8 Cap, Rfl: 0  •  ondansetron (ZOFRAN ODT) 4 MG TABLET DISPERSIBLE, Take 1 Tab by mouth every 6 hours as needed for Nausea., Disp: 20 Tab, Rfl: 0  •  omeprazole (PRILOSEC) 20 MG delayed-release capsule, Take 1 Cap by mouth every day. (Patient not taking: Reported on 4/24/2019), Disp: 30 Cap, Rfl: 0  •  NON SPECIFIED, Take 1 Tab  by mouth every day. Horace Gum, Disp: , Rfl:   •  NON SPECIFIED, Take 1 Tab by mouth every day. Butter Bur, Disp: , Rfl:   •  ibuprofen (MOTRIN) 200 MG Tab, Take 200-600 mg by mouth every 8 hours as needed., Disp: , Rfl:     Allergies: No Known Allergies      Physical Exam:     Vitals:    04/24/19 0938   BP: 120/78   Pulse: 90   Temp: 36.4 °C (97.6 °F)   SpO2: 100%     Constitutional: Well-developed, well-nourished, good hygiene. Appears stated age.  Cardiovascular: RRR, with no murmurs, rubs or gallops. No carotid bruits. No peripheral edema, pedal pulses intact.   Respiratory: Lungs CTA B/L, no W/R/R.   Skin: Warm, dry, intact. No rashes observed.  Eyes: Sclera anicteric.  Neurologic:   Mental Status: Awake, alert, oriented x 3.   Speech: Fluent with normal prosody.   Memory: Able to recall 3 words at 1 minute and 5 minutes. Able to recall recent and remote events accurately.    Concentration: Attentive. Able to focus on history and follow multi-step commands.   Fund of Knowledge: Appropriate.   Cranial Nerves:    CN II: PERRL. No afferent pupillary defect.    CN III, IV, VI: Good eye closure, EOMI.     CN V: Facial sensation intact and symmetric.     CN VII: No facial asymmetry.     CN VIII: Hearing intact.     CN IX and X: Palate elevates symmetrically. Normal gag reflex.    CN XI: Symmetric shoulder shrug.     CN XII: Tongue midline.    Sensory: Slightly decreased temperature sensation subjectively in the right lower extremity. Vibration sense intact.    Coordination: No evidence of past-pointing on finger to nose testing, no dysdiadochokinesia. Heel to shin intact.    DTR's: 2+ in the biceps, triceps, brachioradialis, 3+ in the knees, 2+ in the ankles without clonus.    Babinski: Toes downgoing bilaterally.   Romberg: Negative.   Movements: No tremors observed.   Musculoskeletal:    Strength: Full strength.   Deltoids      R 5/5 L 5/5  Biceps        R 5/5 L 5/5  Triceps       R 5/5 L 5/5  Wrist Ext    R 5/5 L  5/5  Finger Flex R 5/5 L 5/5  Finger Ext   R 5/5 L 5/5  Hip Flex      R 5/5 L 5/5  Knee Flex   R 5/5 L 5/5  Knee Ext    R 5/5 L 5/5  Ankle DF    R 5/5 L 5/5  Ankle PF    R 5/5 L 5/5   Gait: Steady, narrow based. Difficulty with tandem walking.    Tone: Normal bulk and tone.   Joints: No swelling.       Imaging:   Today I reviewed the patient's most recent MRI images with them in the examination room. I explained basic terminology of MRI's, verbalized my assessment, and answered their questions.     MRI of the thoracic spine with and without contrast from [facility] performed on April Impression:  Enhancing demyelinating plaque on the right lateral aspect of the cord at the T10 level consistent with active inflammation.        Assessment/Plan:  Multiple sclerosis (HCC)  Ms. Senior is a 44 yo F with pmhx of GERD who was diagnosed with right eye optic neuritis in March 2019, found to have MRI imaging consistent with multiple sclerosis, and spinal fluid positive for 6 unique oligoclonal bands.  Ms. Senior fulfills the 2017 Garland criteria for multiple sclerosis.  Today we reviewed her recent thoracic spine MRI which showed an active demyelinating lesion in the spinal cord at T10.    Our conversation today centered around disease modifying treatment.  She tested positive for the NIMCO virus antibody and is not a good candidate for Tysabri.  We discussed alternative treatments including Tecfidera, Aubagio, and Rebif, the risks, benefits, and potential side effects of each.  My first choice for treatment for her would be Tecfidera. Side effects of Tecfidera were discussed including GI symptoms such as diarrhea which improves after the first month in most people, and skin flushing which can be minimized by taking the medication with food and a baby Aspirin half an hour before the medication. We discussed that Tecfidera has been associated with PML in a few individuals whose lymphocyte counts were found to be low  and for this reason we would have to check their WBC and lymphocyte counts every 6 months.     Today I reviewed Ms. Senior's imaging studies with her, both the initial brain MRI without contrast and her brain MRI and c-spine MRI's with/wo contrast done during her hospitalization. I discussed with her that the shape, appearance, and location of the lesions is consistent with a diagnosis of MS. She fulfills the 2017 Garland Criteria based on MRI imaging alone, but spinal fluid is also positive and consistent with the diagnosis.       Plan:  1.  Start form filled out for Tecfidera.  2.  She will take a brief holiday from clemastine fumarate to see if her fatigue improves  3.  She will be seeing Dr. Pio Osman next month.      New daily persistent headache  Right frontal constant dull head pain. No features of migraine. Refractory to Tylenol, Aleve, Lyrica, Oxycodone, steroids, and Maxalt.  Some slight improvement with amitriptyline.  Unclear if it is the medication or if the passage of time has led to improved symptoms.  I recommended increasing the dose to 50 mg at bedtime to see if she has further improvement.  If not, we may try her on Topamax.    Plan  1.  Increase amitriptyline to 50 mill grams at bedtime.        Mariah Reid D.O., M.P.H  MS specialist.   Board Certified Neurologist.  Neurology Clerkship Director, [facility].    Neurology,  [facility].   Tel: [phone]  Fax: [phone]

## 2019-04-24 NOTE — ASSESSMENT & PLAN NOTE
Ms. Senior is a 44 yo F with pmhx of GERD who was diagnosed with right eye optic neuritis in March 2019, found to have MRI imaging consistent with multiple sclerosis, and spinal fluid positive for 6 unique oligoclonal bands.  Ms. Senior fulfills the 2017 Garland criteria for multiple sclerosis.  Today we reviewed her recent thoracic spine MRI which showed an active demyelinating lesion in the spinal cord at T10.    Our conversation today centered around disease modifying treatment.  She tested positive for the NIMCO virus antibody and is not a good candidate for Tysabri.  We discussed alternative treatments including Tecfidera, Aubagio, and Rebif, the risks, benefits, and potential side effects of each.  My first choice for treatment for her would be Tecfidera. Side effects of Tecfidera were discussed including GI symptoms such as diarrhea which improves after the first month in most people, and skin flushing which can be minimized by taking the medication with food and a baby Aspirin half an hour before the medication. We discussed that Tecfidera has been associated with PML in a few individuals whose lymphocyte counts were found to be low and for this reason we would have to check their WBC and lymphocyte counts every 6 months.     Today I reviewed Ms. Senior's imaging studies with her, both the initial brain MRI without contrast and her brain MRI and c-spine MRI's with/wo contrast done during her hospitalization. I discussed with her that the shape, appearance, and location of the lesions is consistent with a diagnosis of MS. She fulfills the 2017 Garland Criteria based on MRI imaging alone, but spinal fluid is also positive and consistent with the diagnosis.       Plan:  1.  Start form filled out for Tecfidera.  2.  She will take a brief holiday from clemastine fumarate to see if her fatigue improves  3.  She will be seeing Dr. Pio Osman next month.

## 2019-04-24 NOTE — ASSESSMENT & PLAN NOTE
Right frontal constant dull head pain. No features of migraine. Refractory to Tylenol, Aleve, Lyrica, Oxycodone, steroids, and Maxalt.  Some slight improvement with amitriptyline.  Unclear if it is the medication or if the passage of time has led to improved symptoms.  I recommended increasing the dose to 50 mg at bedtime to see if she has further improvement.  If not, we may try her on Topamax.    Plan  1.  Increase amitriptyline to 50 mill grams at bedtime.

## 2019-05-15 DIAGNOSIS — R51.9 NONINTRACTABLE HEADACHE, UNSPECIFIED CHRONICITY PATTERN, UNSPECIFIED HEADACHE TYPE: ICD-10-CM

## 2019-05-15 RX ORDER — CLEMASTINE FUMARATE 2.68 MG
2 TABLET ORAL 2 TIMES DAILY
Qty: 120 TAB | Refills: 2 | Status: SHIPPED | OUTPATIENT
Start: 2019-05-15 | End: 2021-04-13

## 2019-05-15 RX ORDER — AMITRIPTYLINE HYDROCHLORIDE 25 MG/1
50 TABLET, FILM COATED ORAL NIGHTLY PRN
Qty: 60 TAB | Refills: 2 | Status: SHIPPED | OUTPATIENT
Start: 2019-05-15 | End: 2019-08-13

## 2019-06-20 ENCOUNTER — OFFICE VISIT (OUTPATIENT)
Dept: NEUROLOGY | Facility: MEDICAL CENTER | Age: 44
End: 2019-06-20
Payer: COMMERCIAL

## 2019-06-20 VITALS
OXYGEN SATURATION: 99 % | WEIGHT: 176 LBS | TEMPERATURE: 97.5 F | HEIGHT: 67 IN | SYSTOLIC BLOOD PRESSURE: 116 MMHG | BODY MASS INDEX: 27.62 KG/M2 | HEART RATE: 89 BPM | DIASTOLIC BLOOD PRESSURE: 78 MMHG

## 2019-06-20 DIAGNOSIS — H46.9 OPTIC NEURITIS: ICD-10-CM

## 2019-06-20 DIAGNOSIS — G35 MULTIPLE SCLEROSIS (HCC): ICD-10-CM

## 2019-06-20 PROCEDURE — 99214 OFFICE O/P EST MOD 30 MIN: CPT | Performed by: PSYCHIATRY & NEUROLOGY

## 2019-06-20 RX ORDER — DIMETHYL FUMARATE 240 MG/1
CAPSULE ORAL
COMMUNITY
End: 2021-05-15

## 2019-06-20 ASSESSMENT — ENCOUNTER SYMPTOMS
HEADACHES: 0
FEVER: 0
CONSTIPATION: 0
PALPITATIONS: 0
EYE PAIN: 0
CHILLS: 0
VOMITING: 0
DIARRHEA: 0
BLURRED VISION: 0
ABDOMINAL PAIN: 0
DIZZINESS: 0

## 2019-06-20 ASSESSMENT — PATIENT HEALTH QUESTIONNAIRE - PHQ9: CLINICAL INTERPRETATION OF PHQ2 SCORE: 0

## 2019-06-20 NOTE — ASSESSMENT & PLAN NOTE
Ms. Senior is a 44 yo F who had right eye optic  Neuritis in March 2019, MRI showing demyelinating lesions and found to have 6 unique oligoclonal bands in the CSF. She is now on disease modifying treatment with Tecfidera.       Plan:  Checking Anti-MOG antibody.  Continue Tecfidera 240mg BID.   CBC and CMP q6 months.   Continue CLemastine Fumarate for 90 days.   Next MRI imaging and labs in November 2019. She will call ahead of her appointment.

## 2019-06-20 NOTE — PROGRESS NOTES
CC: Multiple Sclerosis.    HPI:   Ms. Senior returns for follow up for her Multiple Sclerosis. She had 1-2 episodes of flushing after initiating Tecfidera. Initialy, she had stomach discomfort. She had taken aspirin before and since taking it with food has not had any additional episodes of GI pain or flushing.    Other than cramping in her feet, she denies any new symptoms related to her MS. She feels amitryptilne has helped with her mood. Her right eye pain and blurry vision has improved.     Review of Systems   Constitutional: Negative for chills and fever.   Eyes: Negative for blurred vision and pain.   Cardiovascular: Positive for chest pain. Negative for palpitations.   Gastrointestinal: Negative for abdominal pain, constipation, diarrhea and vomiting.   Genitourinary: Negative for dysuria, frequency and urgency.   Skin: Negative for rash.   Neurological: Negative for dizziness and headaches.   All other ROS were negative.         Current Outpatient Prescriptions:   •  Dimethyl Fumarate (TECFIDERA) 240 MG CAPSULE DELAYED RELEASE, Take  by mouth., Disp: , Rfl:   •  amitriptyline (ELAVIL) 25 MG Tab, Take 2 Tabs by mouth at bedtime as needed for up to 90 days., Disp: 60 Tab, Rfl: 2  •  Clemastine Fumarate 2.68 MG Tab, Take 2 Tabs by mouth 2 Times a Day., Disp: 120 Tab, Rfl: 2  •  ondansetron (ZOFRAN ODT) 4 MG TABLET DISPERSIBLE, Take 1 Tab by mouth every 6 hours as needed for Nausea. (Patient not taking: Reported on 6/20/2019), Disp: 20 Tab, Rfl: 0  •  omeprazole (PRILOSEC) 20 MG delayed-release capsule, Take 1 Cap by mouth every day. (Patient not taking: Reported on 4/24/2019), Disp: 30 Cap, Rfl: 0  •  NON SPECIFIED, Take 1 Tab by mouth every day. [place]oul[place]ney Gum, Disp: , Rfl:   •  NON SPECIFIED, Take 1 Tab by mouth every day. Butter Bur, Disp: , Rfl:   •  ibuprofen (MOTRIN) 200 MG Tab, Take 200-600 mg by mouth every 8 hours as needed., Disp: , Rfl:     Physical Examination:  Vitals:    06/20/19 0935   BP: 116/78    Pulse: 89   Temp: 36.4 °C (97.5 °F)   SpO2: 99%     Constitutional: Well-developed, well-nourished, good hygiene. Appears stated age.  Cardiovascular: RRR, with no murmurs, rubs or gallops. No carotid bruits.   Respiratory: Lungs CTA B/L, no W/R/R.   Abdomen: Soft Non-tender to Palpation. Non-distended.  Extremities: No peripheral edema, pedal pulses intact.   Skin: Warm, dry, intact. No rashes observed.  Eyes: Sclera anicteric. No nystagmus observed.   Neurologic:   Mental Status: Awake and alert.    Speech: Speech is fluent with normal prosody.   .           Fund of Knowledge: Appropriate   Cranial Nerves:    CN II: Pupils are equal and react appropriately. No APD noted.    CN III, IV, VI: Good eye closure. Extraocular movements are intact.     CN V: Facial sensation is intact and symmetric.     CN VII: No evidence of facial droop.     CN VIII: Hearing is grossly intact.    CN IX and X: Palate elevates symmetrically.    CN XI: Shoulder shrug is symmetric.     CN XII: Tongue is midline.   Sensory: Sensation is intact to vibration and temperature.    Coordination: There is no discoordination detected with finger tapping or finger to nose testing.        DTR's: Reflexes are 2+ and symmetrical.   Babinski: Toes are downgoing bilaterally.    Romberg: Deferred.   Movements: No tremors noted.  Musculoskeletal:    Strength:   Deltoids      R 5/5 L 5/5  Biceps        R 5/5 L 5/5  Triceps       R 5/5 L 5/5  Wrist Ext    R 5/5 L 5/5  Finger Flex R 5/5 L 5/5  Finger Ext   R 5/5 L 5/5  Hip Flex      R 5/5 L 5/5  Knee Flex   R 5/5 L 5/5  Knee Ext    R 5/5 L 5/5  Ankle DF    R 5/5 L 5/5  Ankle PF    R 5/5 L 5/5   Gait: Gait is narrow based and steady.    Tone: Normal bulk and tone.    Joints: No joint swelling.   Psychiatric: Mood and affect are appropriate.     Imaging Reviewed:  MRI Orbits w/wo contrast from 6/14/19  Comparison:  MRI brain 3/13/2019.     Findings:  There is thickening, increased T2 signal intensity,  enhancement and a haziness to the margins of the orbital segment of the right optic nerve (coronal series 12 image 16 and series 9 image 16) consistent with right-sided optic neuritis. The left optic nerve is normal in signal intensity and size. The optic chiasm and optic tracts are unremarkable.   The globes are spherical and symmetric. The lenses are intact. The extraocular muscles are normal in appearance. The intraconal and extraconal fat is preserved.   The paranasal sinuses are clear. The cavernous sinuses and Meckel's caves are unremarkable.   Limited whole brain evaluation demonstrates extensive periventricular demyelinating plaques. There is no intra-axial mass, extra-axial fluid collections, midline shift or mass effect. The basal cisterns remain patent.     Impression:  Right-sided optic neuritis in this patient with multiple sclerosis.          Assessment and Plan:     Multiple sclerosis (HCC)  Ms. Senior is a 42 yo F who had right eye optic  Neuritis in March 2019, MRI showing demyelinating lesions and found to have 6 unique oligoclonal bands in the CSF. She is now on disease modifying treatment with Tecfidera.       Plan:  Checking Anti-MOG antibody.  Continue Tecfidera 240mg BID.   CBC and CMP q6 months.   Continue CLemastine Fumarate for 90 days.   Next MRI imaging and labs in November 2019. She will call ahead of her appointment.           Mariah Reid D.O., M.P.H  MS specialist.   Board Certified Neurologist.  Neurology Clerkship Director, [facility].    Neurology,  [facility].   Tel: [phone]  Fax: [phone]

## 2019-07-17 ENCOUNTER — OFFICE VISIT (OUTPATIENT)
Dept: MEDICAL GROUP | Facility: IMAGING CENTER | Age: 44
End: 2019-07-17
Payer: COMMERCIAL

## 2019-07-17 VITALS
OXYGEN SATURATION: 100 % | DIASTOLIC BLOOD PRESSURE: 68 MMHG | TEMPERATURE: 98.3 F | RESPIRATION RATE: 12 BRPM | HEART RATE: 100 BPM | HEIGHT: 67 IN | BODY MASS INDEX: 27.15 KG/M2 | WEIGHT: 173 LBS | SYSTOLIC BLOOD PRESSURE: 112 MMHG

## 2019-07-17 DIAGNOSIS — K44.9 HIATAL HERNIA WITH GERD: ICD-10-CM

## 2019-07-17 DIAGNOSIS — K21.9 HIATAL HERNIA WITH GERD: ICD-10-CM

## 2019-07-17 DIAGNOSIS — K95.89 OTHER COMPLICATIONS OF OTHER BARIATRIC PROCEDURE: ICD-10-CM

## 2019-07-17 PROCEDURE — 99214 OFFICE O/P EST MOD 30 MIN: CPT | Performed by: FAMILY MEDICINE

## 2019-07-17 RX ORDER — RANITIDINE 300 MG/1
300 TABLET ORAL
Qty: 90 TAB | Refills: 3 | Status: SHIPPED | OUTPATIENT
Start: 2019-07-17 | End: 2019-12-16

## 2019-07-17 ASSESSMENT — PAIN SCALES - GENERAL: PAINLEVEL: 3=SLIGHT PAIN

## 2019-07-17 NOTE — PROGRESS NOTES
Chief Complaint   Patient presents with   • Gastrophageal Reflux   • Orders Needed     discuss ordering basic food panel-E95   • Fatigue   • Bloody Stools     this am after dumping syndrome symptoms last night     Subjective:   Daksha Senior is a 44 y.o. female here today for multiple problems as listed below.      Other complications of other bariatric procedure  Patient had previous operation at [place] for the gastric sleeve and had been in the last 3 days seeing bloody stools with variable intensity.  This similar presentation had happened before when she was still located in [place] and has been worked up without presentation of leaking from the structure.    Hiatal hernia with GERD  Since her gastric sleeve procedure in 2013 that patient had on and off dumping syndrome but never was persistent for more than 1 day.  In ht last few days she had some persistent bloody stool as described below and while she was eating she also had a few episodes of heart burn sensation along with nausea.     Current medicines (including changes today)  Current Outpatient Prescriptions   Medication Sig Dispense Refill   • ranitidine (ZANTAC) 300 MG tablet Take 1 Tab by mouth every bedtime. 90 Tab 3   • Dimethyl Fumarate (TECFIDERA) 240 MG CAPSULE DELAYED RELEASE Take  by mouth.     • amitriptyline (ELAVIL) 25 MG Tab Take 2 Tabs by mouth at bedtime as needed for up to 90 days. 60 Tab 2   • Clemastine Fumarate 2.68 MG Tab Take 2 Tabs by mouth 2 Times a Day. 120 Tab 2   • ondansetron (ZOFRAN ODT) 4 MG TABLET DISPERSIBLE Take 1 Tab by mouth every 6 hours as needed for Nausea. (Patient not taking: Reported on 6/20/2019) 20 Tab 0   • NON SPECIFIED Take 1 Tab by mouth every day. [place] Gum     • ibuprofen (MOTRIN) 200 MG Tab Take 200-600 mg by mouth every 8 hours as needed.       No current facility-administered medications for this visit.      She  has a past medical history of GERD (gastroesophageal reflux disease).    ROS  No chest  "pain, no shortness of breath, no abdominal pain, no diarrhea/constipation, no urinary symptoms.     Objective:     /68   Pulse 100   Temp 36.8 °C (98.3 °F)   Resp 12   Ht 1.702 m (5' 7\")   Wt 78.5 kg (173 lb)   SpO2 100%  Body mass index is 27.1 kg/m².  Physical Exam:  Alert, oriented in no acute distress.  Eye contact is good, speech goal directed, affect calm  HEENT: conjunctiva non-injected, sclera non-icteric.  Pinna normal. TM pearly gray. Oral mucous membranes pink and moist with no lesions.  Neck: No adenopathy or masses in the neck or supraclavicular regions.  Lungs: clear to auscultation bilaterally with good excursion.  CV: regular rate and rhythm.  Abdomen: soft, nontender, No CVAT  Ext: no edema, color normal, vascularity normal, temperature normal    Assessment and Plan:   The following treatment plan was discussed    1. Hiatal hernia with GERD  MISCELLANEOUS TEST    ALLERGEN, A. ALTERNATAS    ALLERGEN, A. FUMIGATUS    ALLERGEN, A. NIGER IGE    ALLERGEN, OLIVE TREE    ALLERGEN, OCTOPUS IGE, FOOD    ALLERGEN, OLIVES IGG    ALLERGEN, ORCHARD GRASS/COCKSFOOT    ALLERGEN, OYSTER    ALLERGEN, P. ANHYDRIDE IGE, OCCUPATIONAL    ALLERGEN, WHEY    ALLERGEN, WHEY IGG    ALLERGEN, EGG WHITE IGG    ALLERGEN, EGG WHOLE    ALLERGEN, EGG YOLK IGE    ALLERGEN, EGG YOLK IGG    ALLERGEN, ELM TREE    ranitidine (ZANTAC) 300 MG tablet    Discussed with patient of the values in serum allergen test and how the interpretation with regards to the results.  Patient verbalized understanding will proceed with also adding DGL plus three tiems daily.   2. Other complications of other bariatric procedure  CT-ABDOMEN WITH & W/O    Basic Metabolic Panel    Advised patient to avoid gastric irritants and contact us the next morning should the clinical presentation of mucousy bloody stool persist.  In that event patient is advised to obtain CT of abdomin        Followup: Return in about 8 weeks (around 9/11/2019).    Spent 25 " minutes in face-to-tace patient contact in which greater than 50% of the visit was spent in counseling and coordination of care including hiatal hernia management options, Answering patient questions about GERD, Concerns and potential risks related to Zantac, Discussing in depth the importance of primary prevention of gastric perforation, Discussing the nature of tecfidera and Patient is also educated about lifestyle modifications which may improve her GERD, hiatal hernia, nasuea, bloody stool.  We also reviewed PMH, family history, and each of her chronic diagnoses in regards to current management, specialty physicians, symptom control, and future planning. Please refer to assessment and plan/discussion/recommendations for additional details.        Please note that dictation has been dictated using voice recognition soft ware. I have made every reasonable attempt to correct obvious errors, but I expect that there are errors of grammar and possibly content that I did not discover before finalizing the note.

## 2019-07-18 ENCOUNTER — TELEPHONE (OUTPATIENT)
Dept: MEDICAL GROUP | Facility: IMAGING CENTER | Age: 44
End: 2019-07-18

## 2019-07-18 NOTE — TELEPHONE ENCOUNTER
Pt called with update. She is feeling better today and has not noticed any more blood in her stool. She would like to hold off on the CT scan for now and will try to schedule follow up with the bariatric office. Reviewed with Dr. Sanders and he ok'd plan. Pt will call back and notify us of any changes. If CT needs to be done, will fax order to [facility].

## 2019-07-19 NOTE — ASSESSMENT & PLAN NOTE
Since her gastric sleeve procedure in 2013 that patient had on and off dumping syndrome but never was persistent for more than 1 day.  In ht last few days she had some persistent bloody stool as described below and while she was eating she also had a few episodes of heart burn sensation along with nausea.

## 2019-07-19 NOTE — ASSESSMENT & PLAN NOTE
Patient had previous operation at [place] for the gastric sleeve and had been in the last 3 days seeing bloody stools with variable intensity.  This similar presentation had happened before when she was still located in [place] and has been worked up without presentation of leaking from the structure.

## 2019-07-23 DIAGNOSIS — R51.9 NONINTRACTABLE HEADACHE, UNSPECIFIED CHRONICITY PATTERN, UNSPECIFIED HEADACHE TYPE: ICD-10-CM

## 2019-07-24 RX ORDER — AMITRIPTYLINE HYDROCHLORIDE 25 MG/1
TABLET, FILM COATED ORAL
Qty: 30 TAB | Refills: 0 | Status: SHIPPED | OUTPATIENT
Start: 2019-07-24 | End: 2021-04-13

## 2019-08-06 ENCOUNTER — TELEPHONE (OUTPATIENT)
Dept: NEUROLOGY | Facility: MEDICAL CENTER | Age: 44
End: 2019-08-06

## 2019-08-07 DIAGNOSIS — G35 MULTIPLE SCLEROSIS (HCC): ICD-10-CM

## 2019-08-07 PROBLEM — L40.9 SCALP PSORIASIS: Status: ACTIVE | Noted: 2019-08-07

## 2019-08-11 DIAGNOSIS — R51.9 NONINTRACTABLE HEADACHE, UNSPECIFIED CHRONICITY PATTERN, UNSPECIFIED HEADACHE TYPE: ICD-10-CM

## 2019-08-14 RX ORDER — AMITRIPTYLINE HYDROCHLORIDE 25 MG/1
TABLET, FILM COATED ORAL
Qty: 60 TAB | Refills: 3 | OUTPATIENT
Start: 2019-08-14

## 2019-08-14 NOTE — TELEPHONE ENCOUNTER
Pt called requesting refill on Amitriptyline, called refills to pharmacy, pt stated she was out.     Also pt wanted to let dr Reid know she had a cramp last night that lasted almost 30 min, has not had another one since, will call to let us know if it happens again.

## 2019-08-14 NOTE — TELEPHONE ENCOUNTER
Pt called back she would like to speak with dr Reid about her cramping. Pt states the marino coming back. Please advise

## 2019-12-04 ENCOUNTER — NON-PROVIDER VISIT (OUTPATIENT)
Dept: MEDICAL GROUP | Facility: IMAGING CENTER | Age: 44
End: 2019-12-04
Payer: COMMERCIAL

## 2019-12-04 DIAGNOSIS — Z23 NEED FOR VACCINATION: ICD-10-CM

## 2019-12-04 PROCEDURE — 90471 IMMUNIZATION ADMIN: CPT | Performed by: NURSE PRACTITIONER

## 2019-12-04 PROCEDURE — 90686 IIV4 VACC NO PRSV 0.5 ML IM: CPT | Performed by: NURSE PRACTITIONER

## 2019-12-04 NOTE — NON-PROVIDER
"Daksha Senior is a 44 y.o. female here for a non-provider visit for:   FLU    Reason for immunization: Annual Flu Vaccine  Immunization records indicate need for vaccine: Yes, confirmed with Epic  Minimum interval has been met for this vaccine: Yes  ABN completed: Not Indicated    Order and dose verified by: TONE LOBO Dated  10/15/19  was given to patient: Yes  All IAC Questionnaire questions were answered \"No.\"    Patient tolerated injection and no adverse effects were observed or reported: Yes    Pt scheduled for next dose in series: Not Indicated  "

## 2019-12-13 ENCOUNTER — OFFICE VISIT (OUTPATIENT)
Dept: MEDICAL GROUP | Facility: IMAGING CENTER | Age: 44
End: 2019-12-13
Payer: COMMERCIAL

## 2019-12-13 VITALS
HEART RATE: 80 BPM | WEIGHT: 175 LBS | HEIGHT: 67 IN | BODY MASS INDEX: 27.47 KG/M2 | DIASTOLIC BLOOD PRESSURE: 74 MMHG | SYSTOLIC BLOOD PRESSURE: 112 MMHG | OXYGEN SATURATION: 100 % | TEMPERATURE: 98.5 F | RESPIRATION RATE: 19 BRPM

## 2019-12-13 DIAGNOSIS — K21.9 HIATAL HERNIA WITH GERD: ICD-10-CM

## 2019-12-13 DIAGNOSIS — K44.9 HIATAL HERNIA WITH GERD: ICD-10-CM

## 2019-12-13 DIAGNOSIS — L40.9 SCALP PSORIASIS: ICD-10-CM

## 2019-12-13 DIAGNOSIS — G35 MULTIPLE SCLEROSIS (HCC): ICD-10-CM

## 2019-12-13 DIAGNOSIS — H61.23 BILATERAL HEARING LOSS DUE TO CERUMEN IMPACTION: ICD-10-CM

## 2019-12-13 PROCEDURE — 99213 OFFICE O/P EST LOW 20 MIN: CPT | Performed by: FAMILY MEDICINE

## 2019-12-13 ASSESSMENT — PAIN SCALES - GENERAL: PAINLEVEL: NO PAIN

## 2019-12-16 PROBLEM — H61.23 BILATERAL HEARING LOSS DUE TO CERUMEN IMPACTION: Status: ACTIVE | Noted: 2019-12-16

## 2019-12-16 NOTE — PROGRESS NOTES
Subjective:     CC:    Chief Complaint   Patient presents with   • Establish Care     Dr. Sanders patient.   • Cerumen Impaction     hx of cerumen impaction. does she need to see an ENT specialist?   • Referral Needed     OB/GYN. IUD placement 2.5 years ago. hasn't followed up since.   • Multiple Sclerosis     dx'd 3/2019       HISTORY OF THE PRESENT ILLNESS: This pleasant 44 y.o. female is here to establish care and discuss recurrent cerumen impaction and IUD check. His/her prior PCP was Dr. Sanders.  Please see details of HPI scanned into the computer due to epic being down.    Allergies: Patient has no known allergies.    Current Outpatient Medications Ordered in Epic   Medication Sig Dispense Refill   • fluocinonide (LIDEX) 0.05 % external solution Apply a thin layer over affected area for 2 weeks or less to the resolution of symptoms. 1 Bottle 0   • amitriptyline (ELAVIL) 25 MG Tab TAKE 2 TABLETS BY MOUTH AT BEDTIME AS NEEDED 30 Tab 0   • Dimethyl Fumarate (TECFIDERA) 240 MG CAPSULE DELAYED RELEASE Take  by mouth.     • Clemastine Fumarate 2.68 MG Tab Take 2 Tabs by mouth 2 Times a Day. 120 Tab 2   • NON SPECIFIED Take 1 Tab by mouth every day. [place] Gum     • ibuprofen (MOTRIN) 200 MG Tab Take 200-600 mg by mouth every 8 hours as needed.     • ranitidine (ZANTAC) 300 MG tablet Take 1 Tab by mouth every bedtime. (Patient not taking: Reported on 12/13/2019) 90 Tab 3     No current Epic-ordered facility-administered medications on file.        Past Medical History:   Diagnosis Date   • GERD (gastroesophageal reflux disease)        Past Surgical History:   Procedure Laterality Date   • GASTRIC BANDING LAPAROSCOPIC     • PRIMARY C SECTION         Social History     Tobacco Use   • Smoking status: Light Tobacco Smoker     Types: Cigarettes   • Smokeless tobacco: Never Used   • Tobacco comment: socially <1 pack a week.   Substance Use Topics   • Alcohol use: Yes     Alcohol/week: 2.4 oz     Types: 4 Glasses of wine per  "week   • Drug use: No       Social History     Patient does not qualify to have social determinant information on file (likely too young).   Social History Narrative   • Not on file       Family History   Problem Relation Age of Onset   • Cancer Father 68        Colon   • Thyroid Mother    • Alcohol/Drug Brother    • Cancer Maternal Grandmother         Breast   • Other Paternal Grandmother         Macular degeneration   • Allergies Son    • No Known Problems Daughter    • No Known Problems Daughter        ROS:   Gen: no fevers/chills, no changes in weight  Eyes: no changes in vision  ENT: no sore throat, no hearing loss  Pulm: no sob, no cough  CV: no chest pain, no palpitations  GI: no nausea/vomiting, no diarrhea  : no dysuria  MSk: no myalgias  Skin: no rash  Neuro: no headaches, no numbness/tingling  Heme/Lymph: no easy bruising      Objective:     Exam: /74 (BP Location: Right arm, Patient Position: Sitting, BP Cuff Size: Adult)   Pulse 80   Temp 36.9 °C (98.5 °F) (Temporal)   Resp 19   Ht 1.702 m (5' 7\")   Wt 79.4 kg (175 lb)   SpO2 100%  Body mass index is 27.41 kg/m².    General: Normal appearing. No distress.  HEENT: Normocephalic. Eyes conjunctiva clear lids without ptosis, eomi. bilateral cerumen impaction  Neck: Thyroid is not enlarged.  Pulmonary: Clear to ausculation.  Normal effort. No rales, ronchi, or wheezing.  Cardiovascular: Regular rate and rhythm without murmur. Carotid and radial pulses are intact and equal bilaterally.  Neurologic: No facial droop, normal gait, alert and oriented  Lymph: No cervical or supraclavicular lymph nodes are palpable  Skin: Warm and dry.  No obvious lesions.  Musculoskeletal: No extremity cyanosis, clubbing, or edema.  Psych: Normal mood and affect. Judgment and insight is normal.      Assessment & Plan:   44 y.o. female with the following -  Patient would like to follow-up with me for [facility] and women's health she will make an annual appointment for " this  Please see scanned document for billing  Problem List Items Addressed This Visit     Hiatal hernia with GERD     Reviewed GERD diet also discussed GERD and weight gain         Multiple sclerosis (HCC)     Stable         Scalp psoriasis    Bilateral hearing loss due to cerumen impaction        Problem   Bilateral Hearing Loss Due to Cerumen Impaction    No signs of fungal infection  She has been getting this done every year  She will use [facility] 5 days leading up to a procedure appointment where we will remove  I do not think that she needs to see ENT at this time     Scalp Psoriasis    Hand written refill of lidex     Multiple Sclerosis (Hcc)   Hiatal Hernia With Gerd    S/p gastric sleeve 2013       Return for annual.    Please note that this dictation was created using voice recognition software. I have made every reasonable attempt to correct obvious errors, but I expect that there are errors of grammar and possibly content that I did not discover before finalizing the note.

## 2019-12-18 DIAGNOSIS — L40.9 SCALP PSORIASIS: ICD-10-CM

## 2019-12-18 NOTE — TELEPHONE ENCOUNTER
Received fax from [facility] that fluocinonide .05% is unavailable. Would like new prescription faxed.

## 2019-12-23 RX ORDER — FLUOCINONIDE TOPICAL SOLUTION USP, 0.05% 0.5 MG/ML
SOLUTION TOPICAL
Qty: 1 BOTTLE | Refills: 0 | Status: SHIPPED | OUTPATIENT
Start: 2019-12-23 | End: 2020-01-08 | Stop reason: SDUPTHER

## 2020-01-07 DIAGNOSIS — L40.9 SCALP PSORIASIS: ICD-10-CM

## 2020-01-08 RX ORDER — FLUOCINONIDE TOPICAL SOLUTION USP, 0.05% 0.5 MG/ML
SOLUTION TOPICAL
Qty: 1 BOTTLE | Refills: 0 | Status: SHIPPED | OUTPATIENT
Start: 2020-01-08 | End: 2020-01-22

## 2020-01-13 ENCOUNTER — OFFICE VISIT (OUTPATIENT)
Dept: MEDICAL GROUP | Facility: IMAGING CENTER | Age: 45
End: 2020-01-13
Payer: COMMERCIAL

## 2020-01-13 ENCOUNTER — HOSPITAL ENCOUNTER (OUTPATIENT)
Dept: LAB | Facility: MEDICAL CENTER | Age: 45
End: 2020-01-13
Attending: FAMILY MEDICINE
Payer: COMMERCIAL

## 2020-01-13 VITALS
HEIGHT: 67 IN | SYSTOLIC BLOOD PRESSURE: 118 MMHG | TEMPERATURE: 98.7 F | RESPIRATION RATE: 18 BRPM | BODY MASS INDEX: 27.78 KG/M2 | OXYGEN SATURATION: 97 % | HEART RATE: 80 BPM | WEIGHT: 177 LBS | DIASTOLIC BLOOD PRESSURE: 78 MMHG

## 2020-01-13 DIAGNOSIS — Z12.39 BREAST CANCER SCREENING: ICD-10-CM

## 2020-01-13 DIAGNOSIS — Z13.1 DIABETES MELLITUS SCREENING: ICD-10-CM

## 2020-01-13 DIAGNOSIS — G35 MULTIPLE SCLEROSIS (HCC): ICD-10-CM

## 2020-01-13 DIAGNOSIS — Z00.00 ANNUAL PHYSICAL EXAM: ICD-10-CM

## 2020-01-13 DIAGNOSIS — Z12.4 CERVICAL CANCER SCREENING: ICD-10-CM

## 2020-01-13 DIAGNOSIS — Z13.220 SCREENING CHOLESTEROL LEVEL: ICD-10-CM

## 2020-01-13 PROCEDURE — 99396 PREV VISIT EST AGE 40-64: CPT | Performed by: FAMILY MEDICINE

## 2020-01-13 PROCEDURE — 88175 CYTOPATH C/V AUTO FLUID REDO: CPT

## 2020-01-13 PROCEDURE — 87624 HPV HI-RISK TYP POOLED RSLT: CPT

## 2020-01-13 ASSESSMENT — PATIENT HEALTH QUESTIONNAIRE - PHQ9: CLINICAL INTERPRETATION OF PHQ2 SCORE: 0

## 2020-01-13 NOTE — PROGRESS NOTES
Subjective:     CC:   Chief Complaint   Patient presents with   • Gynecologic Exam   • Knee Pain     left, off and on      HPI:   Daksha Senior is a 44 y.o. female who presents for annual exam. She is feeling well and denies any complaints.    Ob-Gyn/ History:    Patient has GYN provider:no  /Para:    Last Pap Smear:  2.5 years. hpv as a teenager and no abnormal cells and resolved on it's own. history of abnormal pap smears.  Gyn Surgery:  none  Current Contraceptive Method:  IUD currently sexually active with .  Last menstrual period:  No periods just spots. denies concerns with mensuration such as heavy periods, cramping, or irregularity.   No vaginal discharge  Interested in STI screening less than 25yo or at risk behavior: no  Safe in relationship.     Health Maintenance  Cholesterol Screening: We will order today  Diabetes Screening: screen today  Diet:good diet, still has some gerd not following diet  Exercise:active lifestyle no gym. Walks. Takes stairs  Substance Abuse: none  Safe in relationship. yes  Seat belts, bike helmet, gun safety discussed.  Sun protection used.  Smoking    Cancer screening  Colorectal Cancer Screening: father with colon cancer diagnosed at 67 and passed away 20 days after. She had a colonoscopy  37yo with 10 year follow up.   Lung Cancer Screenin pack per week 1/7th of a pack 5 years. Though smoked in college.   Cervical Cancer Screening: due now  Breast Cancer Screening: 3-4 years ago. Ordered today    Infectious disease screening/Immunizations    none  She  has a past medical history of GERD (gastroesophageal reflux disease).  She  has a past surgical history that includes primary c section and gastric banding laparoscopic.    Family History   Problem Relation Age of Onset   • Cancer Father 68        Colon   • Thyroid Mother    • Alcohol/Drug Brother    • Cancer Maternal Grandmother         Breast   • Other Paternal Grandmother         Macular  degeneration   • Allergies Son    • No Known Problems Daughter    • No Known Problems Daughter        Social History     Socioeconomic History   • Marital status:      Spouse name: Not on file   • Number of children: Not on file   • Years of education: Not on file   • Highest education level: Not on file   Occupational History   • Not on file   Social Needs   • Financial resource strain: Not on file   • Food insecurity:     Worry: Not on file     Inability: Not on file   • Transportation needs:     Medical: Not on file     Non-medical: Not on file   Tobacco Use   • Smoking status: Light Tobacco Smoker     Types: Cigarettes   • Smokeless tobacco: Never Used   • Tobacco comment: socially <1 pack a week.   Substance and Sexual Activity   • Alcohol use: Yes     Alcohol/week: 2.4 oz     Types: 4 Glasses of wine per week   • Drug use: No   • Sexual activity: Yes     Partners: Male     Birth control/protection: I.U.D.   Lifestyle   • Physical activity:     Days per week: Not on file     Minutes per session: Not on file   • Stress: Not on file   Relationships   • Social connections:     Talks on phone: Not on file     Gets together: Not on file     Attends [employer] service: Not on file     Active member of club or organization: Not on file     Attends meetings of clubs or organizations: Not on file     Relationship status: Not on file   • Intimate partner violence:     Fear of current or ex partner: Not on file     Emotionally abused: Not on file     Physically abused: Not on file     Forced sexual activity: Not on file   Other Topics Concern   • Not on file   Social History Narrative   • Not on file       Patient Active Problem List    Diagnosis Date Noted   • Bilateral hearing loss due to cerumen impaction 12/16/2019   • Scalp psoriasis 08/07/2019   • Other complications of other bariatric procedure 07/17/2019   • Vitamin D deficiency 03/21/2019   • Multiple sclerosis (HCC) 03/20/2019   • Smoker 03/19/2019   • New  "daily persistent headache 03/19/2019   • Chronic tension-type headache, intractable 03/05/2019   • Iron deficiency anemia secondary to inadequate dietary iron intake 01/20/2019   • Hiatal hernia with GERD 11/01/2018   • Anxiety 11/01/2018   • Tobacco use disorder, mild, in controlled environment 11/01/2018   • Preventative health care 11/01/2018         Current Outpatient Medications   Medication Sig Dispense Refill   • fluocinonide (LIDEX) 0.05 % external solution Apply a thin layer over affected area for 2 weeks or less to the resolution of symptoms. 1 Bottle 0   • amitriptyline (ELAVIL) 25 MG Tab TAKE 2 TABLETS BY MOUTH AT BEDTIME AS NEEDED 30 Tab 0   • Dimethyl Fumarate (TECFIDERA) 240 MG CAPSULE DELAYED RELEASE Take  by mouth.     • Clemastine Fumarate 2.68 MG Tab Take 2 Tabs by mouth 2 Times a Day. 120 Tab 2   • ibuprofen (MOTRIN) 200 MG Tab Take 200-600 mg by mouth every 8 hours as needed.     • NON SPECIFIED Take 1 Tab by mouth every day. [place] Gum       No current facility-administered medications for this visit.      No Known Allergies    Review of Systems   Constitutional: Negative for fever, chills, unexplained weight loss, night sweats  HENT: Negative for congestion.    Eyes: Negative for pain or sudden vision changes   Respiratory: Negative for cough and shortness of breath.    Cardiovascular: Negative for leg swelling or chest pain  Gastrointestinal: Negative for nausea, vomiting, abdominal pain and diarrhea.   Genitourinary: Negative for dysuria and hematuria.   Skin: Negative for rash or concerning moles   Neurological: Negative for dizziness, focal weakness and headaches.   Psychiatric/Behavioral: Negative for depression.  The patient is not nervous/anxious.      Objective:     /78 (BP Location: Left arm, Patient Position: Sitting, BP Cuff Size: Adult)   Pulse 80   Temp 37.1 °C (98.7 °F) (Temporal)   Resp 18   Ht 1.702 m (5' 7\")   Wt 80.3 kg (177 lb)   SpO2 97%   BMI 27.72 kg/m² "   Body mass index is 27.72 kg/m².  Wt Readings from Last 4 Encounters:   01/13/20 80.3 kg (177 lb)   12/13/19 79.4 kg (175 lb)   07/17/19 78.5 kg (173 lb)   06/20/19 79.8 kg (176 lb)       Physical Exam:  Constitutional: Well-developed and well-nourished. Not diaphoretic. No distress.   Skin: Skin is warm and dry. No rash noted.  Head: Atraumatic without lesions.  Eyes: Conjunctivae and extraocular motions are normal. Pupils are equal, round, and reactive to light and accomodation. No scleral icterus.   Ears:  External ears unremarkable b/l. Tympanic membranes clear and intact b/l  Nose: Nares patent. Septum midline. Turbinates without erythema nor edema. No discharge.   Mouth/Throat: Dentition is normal. Tongue normal. Oropharynx is clear with out lesions and moist. Posterior pharynx without erythema or exudates.  Neck: Supple, trachea midline. Normal range of motion. No thyromegaly present. No lymphadenopathy--cervical or supraclavicular.  Cardiovascular: Regular rate and rhythm, S1 and S2 without murmur, rubs, or gallops.  Lungs: Normal inspiratory effort, CTA bilaterally, no wheezes/rhonchi/rales  Breast: Breasts examined seated and supine. No skin changes, peau d'orange or nipple retraction. No discharge. No axillary or supraclavicular adenopathy. No masses or nodularity palpable.   Abdomen: Soft, non tender, and without distention. Active normal bowel sounds. No rebound, guarding, masses or HSM.  :Perineum and external genitalia normal without rash. Vagina with normal discharge some spotting today, iud string in place. Cervix without visible lesions or discharge. pelvic exam without adnexal masses or cervical motion tenderness.  Extremities: No cyanosis, clubbing, erythema, nor edema. Distal pulses intact and symmetric.   Musculoskeletal: All major joints AROM full in all directions without pain.  Neurological: Alert and oriented x 3. DTRs 2+/3 and symmetric. No cranial nerve deficit. 5/5 myotomes. Sensation  intact. Negative Rhomberg.  Psychiatric:  Behavior, mood, and affect are appropriate.    A chaperone was offered to the patient during today's exam. Ernestina Tavera    Assessment and Plan:     No problems updated.  Problem List Items Addressed This Visit     Multiple sclerosis (HCC)    Relevant Orders    VITAMIN D,25 HYDROXY      Other Visit Diagnoses     Screening cholesterol level        Relevant Orders    Lipid Profile    Diabetes mellitus screening        Relevant Orders    CBC WITH DIFFERENTIAL    Comp Metabolic Panel    HEMOGLOBIN A1C    Breast cancer screening        Relevant Orders    MA-SCREENING MAMMO BILAT W/TOMOSYNTHESIS W/CAD    Cervical cancer screening        Relevant Orders    THINPREP PAP W/HPV     Annual physical exam            Immunizations per orders  Patient counseled about skin care, diet, supplements, prenatal vitamins, safe sex and exercise.  The ASCVD Risk score ([place] BERNARDO Jr, et al., 2013) failed to calculate.      Follow-up: Return for annual.    -Smoking cessation discussed if smoking and encouraged to come to office if quit and tempted or restarts smoking if pertinent to this patient. We discourage use of electronic smoking devises.   -Discussed healthy drinking habits if over 7 for females, 14 for males per week or more than 4 in one day.  -Discussed healthy eating habits, exercise, being physically active, healthy bmi below 25  -patient to provide ages of family members when they were diagnosed with cancer , especially breast and ovarian, pancreatic cancers.  -Reviewed pap history in female patients, if they have a gynecologist they are encouraged to follow up with that doctor for annual pelvic and breast exams. If they prefer to see me for women's health, I will perform pap smear with hpv dna testing, pelvic, and breast exam, these and male genital exams are always done in the presence of a medical assistant and with verbal permission from the patient.   -Colonoscopy history reviewed with  those over 44yo or those with early family h/o colon cancer. If patient is due we provide them with various colon cancer screen modalities and relevant information to help the patient decide which is best for them.   -Mammograms recommended yearly for women over 41yo. Risks and benefits are reviewed and discussed with the patient and mammogram script provided.   -PSA discussed with males with family history of prostate cancer or those concerned. The decision to order this test is made with the patient.   -If patient prescribed medicines then told to review package insert for any warnings, side effects, contra-indications and medication vs medication reactions.   -STD testing added to lab work due to patients age per guideline recommendations  -Patients screened for anxiety and depression. If positive screening patients are offered behavioral health services, medications, and tools to improve mood.   There are no diagnoses linked to this encounter.  -to improve bone health take calcium and vitamin D, perform weight-bearing exercise, in addition we can discuss additional medications if needed including bisphosphonates, parathyroid hormone, and raloxifene.  Esophageal irritation can occur with bisphosphonate therapy this can be reduced by not laying down for 30 min after taking and taking with a full glass of water  -if wearing nail polish on toes or hands asked to rto if there are any dark brown or black areas under the nails  If lab tests ordered, then patient instructed to go to lab/location/plan  If imaging tests ordered, then patient instructed to go to radiology/location/plan  If medicines ordered, then patient instructed to go to pharmacy/location/plan  Health maintenance I reviewed both men and women's health maintenance  Leading causes of death are motor vehicle accidents, cardiovascular disease, malignant tumors, and HIV.   Breast and ovarian cancer mutation screening was suggested if there was an increased  risk for the patient based on [place] scoring.   -A general visit to see the eye doctor every one to two years was thought appropriate. Dentist ever 6-12 months.  -Immunization suggestions: Tetanus shot every 10 years, Influenza immunization pneumococcal- anyone with chronic illnesses

## 2020-01-15 LAB
CYTOLOGY REG CYTOL: NORMAL
HPV HR 12 DNA CVX QL NAA+PROBE: NEGATIVE
HPV16 DNA SPEC QL NAA+PROBE: NEGATIVE
HPV18 DNA SPEC QL NAA+PROBE: NEGATIVE
SPECIMEN SOURCE: NORMAL

## 2020-01-22 ENCOUNTER — OFFICE VISIT (OUTPATIENT)
Dept: MEDICAL GROUP | Facility: IMAGING CENTER | Age: 45
End: 2020-01-22
Payer: COMMERCIAL

## 2020-01-22 VITALS
SYSTOLIC BLOOD PRESSURE: 116 MMHG | BODY MASS INDEX: 28.09 KG/M2 | TEMPERATURE: 98.2 F | RESPIRATION RATE: 13 BRPM | HEIGHT: 67 IN | OXYGEN SATURATION: 100 % | DIASTOLIC BLOOD PRESSURE: 72 MMHG | HEART RATE: 91 BPM | WEIGHT: 179 LBS

## 2020-01-22 DIAGNOSIS — L40.9 SCALP PSORIASIS: ICD-10-CM

## 2020-01-22 DIAGNOSIS — H61.23 BILATERAL IMPACTED CERUMEN: ICD-10-CM

## 2020-01-22 RX ORDER — FLUOCINONIDE TOPICAL SOLUTION USP, 0.05% 0.5 MG/ML
SOLUTION TOPICAL
Qty: 60 ML | Refills: 0 | Status: SHIPPED | OUTPATIENT
Start: 2020-01-22 | End: 2020-05-04

## 2020-01-22 ASSESSMENT — PAIN SCALES - GENERAL: PAINLEVEL: NO PAIN

## 2020-01-24 PROCEDURE — 69209 REMOVE IMPACTED EAR WAX UNI: CPT | Performed by: FAMILY MEDICINE

## 2020-01-24 NOTE — PROGRESS NOTES
Ear Wax Removal  Date/Time: 1/24/2020 1:08 PM  Performed by: Bebe Bradford M.D.  Authorized by: Bebe Bradford M.D.     Anesthesia:  Local Anesthetic: none  Location details: right ear and left ear  Patient tolerance: Patient tolerated the procedure well with no immediate complications  Comments: No concerns. Patient did well. Ears clear now.   Procedure type: irrigation   Sedation:  Patient sedated: no          .1. Bilateral impacted cerumen    - Ear Wax Removal

## 2020-04-21 ENCOUNTER — PATIENT MESSAGE (OUTPATIENT)
Dept: MEDICAL GROUP | Facility: IMAGING CENTER | Age: 45
End: 2020-04-21

## 2020-04-21 DIAGNOSIS — G35 MULTIPLE SCLEROSIS (HCC): ICD-10-CM

## 2020-04-21 NOTE — TELEPHONE ENCOUNTER
From: Daksha Senior  To: Bebe Bradford M.D.  Sent: 2020 2:42 PM PDT  Subject: Non-Urgent Medical Question    Good Afternoon Dr. Bradford,   Hope you are well! I am supposed to have a telemedicine f/u appt tomorrow with my neurologist Dr. Bowen. Since my insurance changed this year, they need a new referral from my PCP. I still have MS and am taking my MS meds so it seems silly, but that's whats needed. I left a msg yesterday but I'm not sure if it was received. The fax for the referral is [phone]. Thank you!  Best, Daksha Senior,  75

## 2020-05-04 DIAGNOSIS — L40.9 SCALP PSORIASIS: ICD-10-CM

## 2020-05-04 RX ORDER — FLUOCINONIDE TOPICAL SOLUTION USP, 0.05% 0.5 MG/ML
SOLUTION TOPICAL
Qty: 60 ML | Refills: 0 | Status: SHIPPED | OUTPATIENT
Start: 2020-05-04 | End: 2020-07-10

## 2020-07-10 DIAGNOSIS — L40.9 SCALP PSORIASIS: ICD-10-CM

## 2020-07-10 RX ORDER — FLUOCINONIDE TOPICAL SOLUTION USP, 0.05% 0.5 MG/ML
SOLUTION TOPICAL
Qty: 60 ML | Refills: 0 | Status: SHIPPED | OUTPATIENT
Start: 2020-07-10 | End: 2021-10-18

## 2020-08-03 ENCOUNTER — TELEMEDICINE (OUTPATIENT)
Dept: MEDICAL GROUP | Facility: IMAGING CENTER | Age: 45
End: 2020-08-03
Payer: COMMERCIAL

## 2020-08-03 VITALS — BODY MASS INDEX: 28.09 KG/M2 | TEMPERATURE: 97.6 F | HEIGHT: 67 IN | WEIGHT: 179 LBS

## 2020-08-03 DIAGNOSIS — M25.551 LATERAL PAIN OF RIGHT HIP: ICD-10-CM

## 2020-08-03 PROCEDURE — 99213 OFFICE O/P EST LOW 20 MIN: CPT | Mod: 95,CR | Performed by: FAMILY MEDICINE

## 2020-08-03 ASSESSMENT — FIBROSIS 4 INDEX: FIB4 SCORE: 0.81

## 2020-08-03 ASSESSMENT — PAIN SCALES - GENERAL: PAINLEVEL: 8=MODERATE-SEVERE PAIN

## 2020-08-03 NOTE — PROGRESS NOTES
"Telemedicine Visit: New Patient     This encounter was conducted via [employer].   Verbal consent was obtained. Patient's identity was verified. Patient aware this will be   billed the same as an in person evaluation.    Subjective:     Chief Complaint   Patient presents with   • Pain     left knee pain. x1 week, monday, running. \"extremely painful sharp pain right hip\". pain with going up the stairs. taking aleve and icing with little relief.       Daksha Senior is a 45 y.o. female with history of left knee pain on virtual visit to discuss hip pain started one week ago. She was running the day it started and experienced a shooting pain that has not resolved. She has been taking motrin and applying ice to the hip. Sitting is ok, walking up stairs or walking is painful. 8/10 pain scale now. Aches when she moves. Lateral hip tenderness. Pushing on it helps when going up stairs. Radiates posteriorly though mostly in the hip area. No fevers, chills, redness or swelling of that joint. Has had iliotibial band syndrome. Has been hiking and running a lot.       ROS  See HPI  Constitutional: Negative for fever, chills and malaise/fatigue.   HENT: Negative for congestion, itchy watery eyes  Eyes: Negative for pain or sudden changes in vision  Respiratory: Negative for cough and shortness of breath.    Cardiovascular: Negative for leg swelling or chest pain  Gastrointestinal: Negative for nausea, vomiting, abdominal pain and diarrhea.   Genitourinary: Negative for dysuria and hematuria.   Skin: Negative for rash or concerning moles   Neurological: Negative for dizziness, focal weakness   Psychiatric/Behavioral: Negative for depression.  The patient is not nervous/anxious.    Musculoskeletal: no weakness or joint stiffness    No Known Allergies    Current medicines (including changes today)  Current Outpatient Medications   Medication Sig Dispense Refill   • Omeprazole Magnesium (PRILOSEC OTC PO) Take  by mouth.     • " fluocinonide (LIDEX) 0.05 % external solution APPLY A THIN LAYER TO  AFFECTED AREA(S) TOPICALLY  AS DIRECTED FOR 2 WEEKS OR  LESS TO THE RESOLUTION OF  SYMPTOMS 60 mL 0   • amitriptyline (ELAVIL) 25 MG Tab TAKE 2 TABLETS BY MOUTH AT BEDTIME AS NEEDED 30 Tab 0   • Dimethyl Fumarate (TECFIDERA) 240 MG CAPSULE DELAYED RELEASE Take  by mouth.     • Clemastine Fumarate 2.68 MG Tab Take 2 Tabs by mouth 2 Times a Day. 120 Tab 2   • ibuprofen (MOTRIN) 200 MG Tab Take 200-600 mg by mouth every 8 hours as needed.     • NON SPECIFIED Take 1 Tab by mouth every day. [place] Gum       No current facility-administered medications for this visit.        She  has a past medical history of GERD (gastroesophageal reflux disease).  She  has a past surgical history that includes primary c section and gastric banding laparoscopic.      Family History   Problem Relation Age of Onset   • Cancer Father 68        Colon   • Thyroid Mother    • Alcohol/Drug Brother    • Cancer Maternal Grandmother         Breast   • Other Paternal Grandmother         Macular degeneration   • Allergies Son    • No Known Problems Daughter    • No Known Problems Daughter      Family Status   Relation Name Status   • Fa     • Mo  Alive   • Bro  Alive   • MGMo     • PGMo     • Son  Alive   • Lovely  Alive   • Lovely  Alive       Patient Active Problem List    Diagnosis Date Noted   • Bilateral hearing loss due to cerumen impaction 2019   • Scalp psoriasis 2019   • Other complications of other bariatric procedure 2019   • Vitamin D deficiency 2019   • Multiple sclerosis (HCC) 2019   • Smoker 2019   • New daily persistent headache 2019   • Chronic tension-type headache, intractable 2019   • Iron deficiency anemia secondary to inadequate dietary iron intake 2019   • Hiatal hernia with GERD 2018   • Anxiety 2018   • Tobacco use disorder, mild, in controlled environment 2018   •  "[facility] care 11/01/2018          Objective:   Vitals obtained by patient:  Temp 36.4 °C (97.6 °F) (Temporal)   Ht 1.702 m (5' 7\")   Wt 81.2 kg (179 lb)   BMI 28.04 kg/m²     Physical Exam:  Constitutional: Alert, no distress, well-groomed.  Skin: No rashes in visible areas.  Eye: Round. Conjunctiva clear, lids normal. No icterus.   ENMT: Lips pink without lesions, good dentition, moist mucous membranes. Phonation normal.  Neck: No masses, no thyromegaly. Moves freely without pain.  CV: Pulse as reported by patient  Respiratory: Unlabored respiratory effort, no cough or audible wheeze  Psych: Alert and oriented x3, normal affect and mood.   Neuro: symmetric face. Alert and oriented. Follows commands. No aphasia or dysarthria.    Labs:  No visits with results within 1 Month(s) from this visit.   Latest known visit with results is:   Hospital Outpatient Visit on 01/13/2020   Component Date Value Ref Range Status   • Cytology Reg 01/13/2020 See Path Report   Final    Comment: A separate pathology report will follow after the Cytology  specimen has been received by the laboratory and the results  are signed out by a pathologist.  Please see \"Pathology GYN Specimen\" order for these results.     • Source 01/13/2020 Endo/Cervical   Final   • HPV Genotype 16 01/13/2020 Negative  Negative Final   • HPV Genotype 18 01/13/2020 Negative  Negative Final   • HPV Other High Risk Genotypes 01/13/2020 Negative  Negative Final    Comment: This test detects the high-risk HPV types 16, 18, 31, 33,  35, 39, 45, 51, 52, 56, 58, 59, 66, and 68. It is intended  for use in women 21 years and older with ASC-US cervical  cytology results and in women 30 years and older with  positive high-risk HPV results. Sensitivity may be affected  by specimen collection methods, stage of infection, and the  presence of interfering substances. Results should be  interpreted in conjunction with other available laboratory  and clinical data.   "       Imaging:   No results found.    Assessment and Plan:   The following treatment plan was discussed:   Let her know ortho or pt after xray  Problem List Items Addressed This Visit     None      Visit Diagnoses     Lateral pain of right hip        Relevant Orders    DX-HIP-BILATERAL-WITH PELVIS-2 VIEWS    REFERRAL TO PHYSICAL THERAPY Reason for Therapy: Eval/Treat/Report    REFERRAL TO ORTHOPEDICS        Follow-up: Return if symptoms worsen or fail to improve.        Portions of this note may be dictated using Dragon NaturallySpeaMobiPixie voice recognition software.  Variances in spelling and vocabulary are possible and unintentional.  Not all areas may be caught/corrected.  Please notify me if any discrepancies are noted or if the meaning of any statement is not correct/clear.

## 2020-08-11 ENCOUNTER — HOSPITAL ENCOUNTER (OUTPATIENT)
Dept: RADIOLOGY | Facility: MEDICAL CENTER | Age: 45
End: 2020-08-11
Attending: FAMILY MEDICINE
Payer: COMMERCIAL

## 2020-08-11 DIAGNOSIS — M25.551 LATERAL PAIN OF RIGHT HIP: ICD-10-CM

## 2020-08-11 PROCEDURE — 73521 X-RAY EXAM HIPS BI 2 VIEWS: CPT

## 2020-10-24 ENCOUNTER — IMMUNIZATION (OUTPATIENT)
Dept: SOCIAL WORK | Facility: CLINIC | Age: 45
End: 2020-10-24
Payer: COMMERCIAL

## 2020-10-24 DIAGNOSIS — Z23 NEED FOR VACCINATION: Primary | ICD-10-CM

## 2020-10-24 PROCEDURE — 90686 IIV4 VACC NO PRSV 0.5 ML IM: CPT | Performed by: REGISTERED NURSE

## 2020-10-24 PROCEDURE — 90471 IMMUNIZATION ADMIN: CPT | Performed by: REGISTERED NURSE

## 2020-11-23 ENCOUNTER — APPOINTMENT (OUTPATIENT)
Dept: MEDICAL GROUP | Facility: IMAGING CENTER | Age: 45
End: 2020-11-23
Payer: COMMERCIAL

## 2020-12-01 ENCOUNTER — OFFICE VISIT (OUTPATIENT)
Dept: MEDICAL GROUP | Facility: IMAGING CENTER | Age: 45
End: 2020-12-01
Payer: COMMERCIAL

## 2020-12-01 ENCOUNTER — PATIENT MESSAGE (OUTPATIENT)
Dept: MEDICAL GROUP | Facility: IMAGING CENTER | Age: 45
End: 2020-12-01

## 2020-12-01 VITALS
WEIGHT: 185 LBS | HEIGHT: 67 IN | DIASTOLIC BLOOD PRESSURE: 78 MMHG | TEMPERATURE: 97.8 F | BODY MASS INDEX: 29.03 KG/M2 | RESPIRATION RATE: 12 BRPM | HEART RATE: 104 BPM | SYSTOLIC BLOOD PRESSURE: 132 MMHG | OXYGEN SATURATION: 98 %

## 2020-12-01 DIAGNOSIS — K21.9 GASTROESOPHAGEAL REFLUX DISEASE, UNSPECIFIED WHETHER ESOPHAGITIS PRESENT: ICD-10-CM

## 2020-12-01 DIAGNOSIS — L70.0 ACNE VULGARIS: ICD-10-CM

## 2020-12-01 DIAGNOSIS — G35 MULTIPLE SCLEROSIS (HCC): ICD-10-CM

## 2020-12-01 DIAGNOSIS — Z79.899 MEDICATION MANAGEMENT: ICD-10-CM

## 2020-12-01 PROCEDURE — 99214 OFFICE O/P EST MOD 30 MIN: CPT | Performed by: FAMILY MEDICINE

## 2020-12-01 RX ORDER — OMEPRAZOLE 40 MG/1
40 CAPSULE, DELAYED RELEASE ORAL DAILY
Qty: 90 CAP | Refills: 1 | Status: SHIPPED | OUTPATIENT
Start: 2020-12-01 | End: 2021-07-16

## 2020-12-01 SDOH — HEALTH STABILITY: MENTAL HEALTH: HOW MANY STANDARD DRINKS CONTAINING ALCOHOL DO YOU HAVE ON A TYPICAL DAY?: 1 OR 2

## 2020-12-01 SDOH — HEALTH STABILITY: MENTAL HEALTH: HOW OFTEN DO YOU HAVE A DRINK CONTAINING ALCOHOL?: 4 OR MORE TIMES A WEEK

## 2020-12-01 ASSESSMENT — ENCOUNTER SYMPTOMS
DIZZINESS: 0
DOUBLE VISION: 0
ABDOMINAL PAIN: 0
MYALGIAS: 0
PALPITATIONS: 0
DIARRHEA: 0
WHEEZING: 0
SHORTNESS OF BREATH: 0
FEVER: 0
HEADACHES: 0
CHILLS: 0
NAUSEA: 0
EYE PAIN: 0
COUGH: 0
VOMITING: 0

## 2020-12-01 ASSESSMENT — PAIN SCALES - GENERAL: PAINLEVEL: NO PAIN

## 2020-12-01 ASSESSMENT — FIBROSIS 4 INDEX: FIB4 SCORE: 0.81

## 2020-12-01 NOTE — PROGRESS NOTES
Chief Complaint   Patient presents with   • Gastrophageal Reflux   • Weight Gain     HPI:  45 year old with ms, daily headaches, here for an ekg prior to neuro making some changes to her ms medications. She also reports weight gain and worsened gerd.  She is not taking any prescription medications for this right now.  She has been going through a lot of Tums.  She reports that she has to take her MS medications with fat twice a day.  She reports this is likely her cause of weight gain.  And heartburn.  For her MS she does not have any symptoms though follow-up imaging revealed an active lesion and thus the need for switching medications.No Known Allergies  Current Outpatient Medications   Medication Sig Dispense Refill   • Tretinoin 0.05 % Lotion Apply 1 Application topically every day. 20 g 3   • omeprazole (PRILOSEC) 40 MG delayed-release capsule Take 1 Cap by mouth every day. 90 Cap 1   • fluocinonide (LIDEX) 0.05 % external solution APPLY A THIN LAYER TO  AFFECTED AREA(S) TOPICALLY  AS DIRECTED FOR 2 WEEKS OR  LESS TO THE RESOLUTION OF  SYMPTOMS 60 mL 0   • amitriptyline (ELAVIL) 25 MG Tab TAKE 2 TABLETS BY MOUTH AT BEDTIME AS NEEDED 30 Tab 0   • Dimethyl Fumarate (TECFIDERA) 240 MG CAPSULE DELAYED RELEASE Take  by mouth.     • Clemastine Fumarate 2.68 MG Tab Take 2 Tabs by mouth 2 Times a Day. (Patient taking differently: Take 2 Tabs by mouth every evening.) 120 Tab 2   • ibuprofen (MOTRIN) 200 MG Tab Take 200-600 mg by mouth every 8 hours as needed.       No current facility-administered medications for this visit.      Social History     Tobacco Use   • Smoking status: Light Tobacco Smoker     Types: Cigarettes   • Smokeless tobacco: Never Used   • Tobacco comment: socially <1 pack a week.   Substance Use Topics   • Alcohol use: Yes     Alcohol/week: 2.4 oz     Types: 4 Glasses of wine per week     Frequency: 4 or more times a week     Drinks per session: 1 or 2   • Drug use: No     Family History   Problem  "Relation Age of Onset   • Cancer Father 68        Colon   • Thyroid Mother    • Alcohol/Drug Brother    • Cancer Maternal Grandmother         Breast   • Other Paternal Grandmother         Macular degeneration   • Allergies Son    • No Known Problems Daughter    • No Known Problems Daughter        /78   Pulse (!) 104   Temp 36.6 °C (97.8 °F)   Resp 12   Ht 1.702 m (5' 7\")   Wt 83.9 kg (185 lb)   SpO2 98%  Body mass index is 28.98 kg/m².  Review of Systems   Constitutional: Negative for chills, fever and malaise/fatigue.   HENT: Negative for hearing loss and tinnitus.    Eyes: Negative for double vision and pain.   Respiratory: Negative for cough, shortness of breath and wheezing.    Cardiovascular: Negative for chest pain, palpitations and leg swelling.   Gastrointestinal: Negative for abdominal pain, diarrhea, nausea and vomiting.   Genitourinary: Negative for dysuria and frequency.   Musculoskeletal: Negative for joint pain and myalgias.   Skin: Positive for rash. Negative for itching.   Neurological: Negative for dizziness and headaches.     Physical Exam   Constitutional: She is well-developed, well-nourished, and in no distress. No distress.   HENT:   Head: Normocephalic and atraumatic.   Eyes: Pupils are equal, round, and reactive to light. Conjunctivae and EOM are normal. Right eye exhibits no discharge. Left eye exhibits no discharge. No scleral icterus.   Neck: No thyromegaly present.   Pulmonary/Chest: Effort normal. No respiratory distress.   Abdominal: She exhibits no distension.   Musculoskeletal:         General: No edema.   Neurological: She is alert.   Skin: Skin is warm and dry. She is not diaphoretic.   Psychiatric: Affect and judgment normal.       All labs (last 1 month):   No visits with results within 1 Month(s) from this visit.   Latest known visit with results is:   Hospital Outpatient Visit on 01/13/2020   Component Date Value Ref Range Status   • Cytology Reg 01/13/2020 See Path " "Report   Final    Comment: A separate pathology report will follow after the Cytology  specimen has been received by the laboratory and the results  are signed out by a pathologist.  Please see \"Pathology GYN Specimen\" order for these results.     • Source 01/13/2020 Endo/Cervical   Final   • HPV Genotype 16 01/13/2020 Negative  Negative Final   • HPV Genotype 18 01/13/2020 Negative  Negative Final   • HPV Other High Risk Genotypes 01/13/2020 Negative  Negative Final    Comment: This test detects the high-risk HPV types 16, 18, 31, 33,  35, 39, 45, 51, 52, 56, 58, 59, 66, and 68. It is intended  for use in women 21 years and older with ASC-US cervical  cytology results and in women 30 years and older with  positive high-risk HPV results. Sensitivity may be affected  by specimen collection methods, stage of infection, and the  presence of interfering substances. Results should be  interpreted in conjunction with other available laboratory  and clinical data.         Lipids: No results found for: CHOLSTRLTOT, TRIGLYCERIDE, HDL, LDL    Imaging: No results found.    A/P  EKG normal sinus rhythm  Referring to Dr. Fernando Esqueda for consultation    Return for annual.    Problem List Items Addressed This Visit     Multiple sclerosis (HCC)    Relevant Orders    REFERRAL TO NEUROLOGY      Other Visit Diagnoses     Acne vulgaris        Relevant Medications    Tretinoin 0.05 % Lotion    Medication management        Relevant Orders    EKG - Clinic Performed    Gastroesophageal reflux disease, unspecified whether esophagitis present        Relevant Medications    omeprazole (PRILOSEC) 40 MG delayed-release capsule          Portions of this note may be dictated using Dragon NaturallySpeaking voice recognition software.  Variances in spelling and vocabulary are possible and unintentional.  Not all areas may be caught/corrected.  Please notify me if any discrepancies are noted or if the meaning of any statement is not correct/clear.  "

## 2020-12-11 ENCOUNTER — OFFICE VISIT (OUTPATIENT)
Dept: NEUROLOGY | Facility: MEDICAL CENTER | Age: 45
End: 2020-12-11
Payer: COMMERCIAL

## 2020-12-11 VITALS
HEART RATE: 94 BPM | TEMPERATURE: 97.7 F | DIASTOLIC BLOOD PRESSURE: 78 MMHG | HEIGHT: 68 IN | BODY MASS INDEX: 28.2 KG/M2 | WEIGHT: 186.07 LBS | OXYGEN SATURATION: 100 % | RESPIRATION RATE: 16 BRPM | SYSTOLIC BLOOD PRESSURE: 120 MMHG

## 2020-12-11 DIAGNOSIS — G35 MULTIPLE SCLEROSIS (HCC): ICD-10-CM

## 2020-12-11 PROCEDURE — 99215 OFFICE O/P EST HI 40 MIN: CPT | Performed by: PSYCHIATRY & NEUROLOGY

## 2020-12-11 RX ORDER — M-VIT,TX,IRON,MINS/CALC/FOLIC 27MG-0.4MG
1 TABLET ORAL DAILY
COMMUNITY

## 2020-12-11 RX ORDER — VITAMIN B COMPLEX
1000 TABLET ORAL DAILY
COMMUNITY

## 2020-12-11 ASSESSMENT — FIBROSIS 4 INDEX: FIB4 SCORE: 0.81

## 2020-12-11 NOTE — PROGRESS NOTES
"[facility]  MULTIPLE SCLEROSIS & NEUROIMMUNOLOGY  FOLLOW-UP VISIT    Referral source: Bebe Bradford MD    DISEASE SUMMARY:  Principal neurologic diagnosis: MS  Diagnosis of MS: 3/2019  Disease History:  - 2/24/2019: onset of left-sided headache and left monocular visual \"grayness\" with pain with eye movements  - 3/2019: onset of visual disturbance in both eyes; admitted to hospital and treated with high-dose steroids w/ gradual improvement to baseline  - 5/2019: started Tecfidera  Disease course at onset: relapsing  Current disease course: relapsing  Previous disease therapies:  - none  Current disease therapies:  - Tecfidera  Symptomatic therapies:  - none  CSF:  - OCBs: 6  Other Testing:  - JCV Ab: positive  - anti-MOG Ab (7/8/2019): negative  MRI head:  - 11/5/2020: 1 enhancing lesion in the left temporal lobe  - 11/22/2019  - 6/13/2019  - 3/19/2019: typical lesions w/ enhancement  MRI cervical spine:  - 3/20/2019: no visible lesions  MRI thoracic spine:  - 4/11/2019: enhancing lesion at T10    CC: \"multiple sclerosis\"    HISTORY OF ILLNESS:  Daksha Senior is a 45 y.o. woman with a history most notable for MS, headache, anxiety, and tobacco use.  She provided the following history:    2/24/2019:  Daksha drank a lot.  She developed a headache.  The headache persisted for 1 month.  The pain localized to the right side of the head.  The pain may have been behind the eye.  There was pain with eye movements.  She noticed that if she closed the left eye, the vision was \"gray, smokey\" in the right eye.  The vision in the left eye was normal.  She went to an optometrist and then her primary care doctor.  Her PCP ordered an MRI.    3/2019:  Daksha underwent MRI.  The results were abnormal, and said something like \"consistent with MS.\"  Her PCP prescribed 1 day of steroids.  The headache continued.  Daksha looked outside and noticed blurriness of vision in both eyes.  She mentioned these symptoms to her sister.  " Daksha was admitted and she underwent repeat scans.  She was diagnosed with MS and was prescribed IVMP x5 days.  Her vision improved over the course of ~2 weeks.  The headache improved as well.    3/2019:  Daksha Saw Dr. Reid and Dr. Osman.  She was diagnosed with MS.    5/2019:  Daksha started Tecfidera.  Daksha tolerated this well, though she occasionally experienced flushing and GI disturbance.  She takes this consistently.    7/8/20019:  Anti-MOG Ab was negative.    11/2019:  Daksha saw saw Dr. Bowen.    MEDICAL AND SURGICAL HISTORY:  Past Medical History:   Diagnosis Date   • GERD (gastroesophageal reflux disease)      Past Surgical History:   Procedure Laterality Date   • GASTRIC BANDING LAPAROSCOPIC     • PRIMARY C SECTION       MEDICATIONS:  Current Outpatient Medications   Medication Sig   • vitamin D (CHOLECALCIFEROL) 1000 Unit (25 mcg) Tab Take 1,000 Units by mouth every day.   • therapeutic multivitamin-minerals (THERAGRAN-M) Tab Take 1 Tab by mouth every day.   • omeprazole (PRILOSEC) 40 MG delayed-release capsule Take 1 Cap by mouth every day.   • fluocinonide (LIDEX) 0.05 % external solution APPLY A THIN LAYER TO  AFFECTED AREA(S) TOPICALLY  AS DIRECTED FOR 2 WEEKS OR  LESS TO THE RESOLUTION OF  SYMPTOMS   • amitriptyline (ELAVIL) 25 MG Tab TAKE 2 TABLETS BY MOUTH AT BEDTIME AS NEEDED   • Dimethyl Fumarate (TECFIDERA) 240 MG CAPSULE DELAYED RELEASE Take  by mouth.   • Clemastine Fumarate 2.68 MG Tab Take 2 Tabs by mouth 2 Times a Day. (Patient taking differently: Take 2 Tabs by mouth every evening.)   • ibuprofen (MOTRIN) 200 MG Tab Take 200-600 mg by mouth every 8 hours as needed.   • Tretinoin 0.05 % Lotion Apply 1 Application topically every day. (Patient not taking: Reported on 12/11/2020)     SOCIAL HISTORY:  Social History     Tobacco Use   • Smoking status: Light Tobacco Smoker     Types: Cigarettes   • Smokeless tobacco: Never Used   • Tobacco comment: socially <1 pack a week.   Substance Use  Topics   • Alcohol use: Yes     Alcohol/week: 2.4 oz     Types: 4 Glasses of wine per week     Frequency: 4 or more times a week     Drinks per session: 1 or 2     Social History     Social History Narrative   • Not on file     FAMILY HISTORY:  Family History   Problem Relation Age of Onset   • Cancer Father 68        Colon   • Thyroid Mother    • Alcohol/Drug Brother    • Cancer Maternal Grandmother         Breast   • Other Paternal Grandmother         Macular degeneration   • Allergies Son    • No Known Problems Daughter    • No Known Problems Daughter      REVIEW OF SYSTEMS:  A ROS was completed.  Pertinent positives and negatives were included in the HPI, above.  All other systems were reviewed and are negative.    PHYSICAL EXAM:  General/Medical:  - NAD  - hair, skin, nails, and joints were normal  - heart rate and rhythm were regular    Neuro:  MENTAL STATUS: awake and alert; no deficits of speech or language; oriented to person, place, and time; affect was appropriate to situation; pleasant, cooperative    CRANIAL NERVES:    II: acuity was: J1+/J1; fields intact to confrontation; pupils 3/3 to 2/2 without a relative afferent pupillary defect; discs sharp    III/IV/VI: versions intact without nystagmus    V: facial sensation symmetric to light touch    VII: facial expression symmetric    VIII: hearing intact to finger rub    IX/X: palate elevates symmetrically    XI: shoulder shrug symmetric    XII: tongue midline    MOTOR:  - bulk and tone were normal throughout  Upper Extremity Strength  (R/L)    5/5   Elbow flexion 5/5   Elbow extension 5/5   Shoulder abduction 5/5     Lower Extremity Strength  (R/L)   Hip flexion 5/5   Knee extension 5/5   Knee flexion 5/5   Ankle plantarflexion 5/5   Ankle dorsiflexion 5/5     - can walk on toes and heels  - no pronator drift; no abnormal movements    SENSATION:  - light touch: intact over the upper and lower extremities  - vibration (R/L, seconds): 11/10 at the great  "toes  - pinprick: NT  - proprioception: NT  - Romberg: absent    COORDINATION:  - finger to nose was normal, no ataxia on exam  - finger tapping was rapid and accurate, bilaterally    REFLEXES:  Reflex Right Left   BR 2+ 2+   Biceps 2+ 2+   Triceps 2+ 2+   Patellae 2+ 2+   Achilles 2+ 2+   Toes down mute     GAIT:  - narrow base and normal  - heel-raised and toe-raised gait: intact  - tandem gait: intact    QUANTITATIVE SCORES:  Timed 25-foot walk (sec): 3.5  Assistive device: none    REVIEW OF IMAGING STUDIES: I reviewed the following studies:  MRI Brain:  Date: 11/5/2020  W/o and w/ contrast?: yes  Indication: \"MS\"  Comparison: \"11/22/2019, 6/13/2019 and 3/13/2019\"  Impression:  \"Enhancing lesion in the left temporal periventricular white matter consistent with active inflammation. Findings satisfy Garland's MRI MS criteria for dissemination in space and time.\"    Date: 3/19/2019  W/o and w/ contrast?: yes  Indication: \"headache, chronic, neuro deficit\"  Comparison: 3/3/2019  Impression:  \"There are multiple abnormal T2 hyperintensities in the subcortical and periventricular white matter consistent with demyelinating plaques. Abnormal contrast enhancement is seen in the 2 of the periventricular lesions likely representing \"active\" demyelinating lesions.\"    MRI Cervical Spine:  Date: 3/20/2019  W/o and w/ contrast?: yes  Indication: \"demyelinating disorder,; abnormal han MRI\"  Comparison: 3/20/2019  Impression:  \"1) No significant degenerative disc disease or facet degeneration. No central canal or neural foraminal narrowing.  2) No cervical spinal cord lesions or abnormal enhancement.\"    MRI Thoracic Spine:  Date: 4/11/2019  W/o and w/ contrast?: yes  Indication: \"MS\"  Comparison: none  Impression:  \"Enhancing demyelinating plaque on the right lateral aspect of the cord at the T10 level consistent with active inflammation.\"    REVIEW OF LABORATORY STUDIES:  No recent data available.    ASSESSMENT:  Daksha Wade " "Parrish is a 45 y.o. woman with MS.  Her clinical history and imaging studies (showing presence of typical lesions with interval development of enhancing lesions) are consistent with this diagnosis.  Furthermore, anti-MOG Abs are absent.  Daksha has experienced breakthrough disease on Tecfidera.  We discussed more potent immunotherapies including: Lemtrada, Cladribine, Tysabri (would not recommend this since she is JCV Ab positive), Ocrevus, Kesimpta, Gilenya, Mayzent, and Zeposia.  Daksha expressed the most interest in Ocrevus.  We discussed the required pre-treatment workup, including:    - CBC w/ diff  - CMP  - Hep B & C profile  - IgA, IgG, IgM  - Lymphocyte subsets (B & T)  - PPD or Quantiferon  - VZV IgG (vaccinate with Shingrix vaccine if negative)    Daksha should also ensure that she has received the Prevnar 13 vaccine.  She recently had lab work done.  She will review the results and upload the above data (if available) to [facility] for review.    PLAN:  Multiple Sclerosis:  - plan to start Ocrevus  - Daksha will submit above lab results to [facility]  - Shingrix vaccine if VZV IgG negative (NOT the varicella vaccine, since this is live)  - Prevnar 13 vaccine (if not already administered)    Follow-Up:  - Return in about 4 months (around 4/11/2021).    Signed: Fernando Flor M.D. at 11:15 AM on 12/11/20    BILLING DOCUMENTATION:   I spent 60 minutes face-to-face with this patient.  Over 50% of this time was spent on counseling and/or coordination of care wtih the patient and/or family.  Please see \"assessment\" above for details of our discussion.  "

## 2020-12-14 DIAGNOSIS — G35 MULTIPLE SCLEROSIS (HCC): Primary | ICD-10-CM

## 2020-12-14 RX ORDER — ZOSTER VACCINE RECOMBINANT, ADJUVANTED 50 MCG/0.5
0.5 KIT INTRAMUSCULAR ONCE
Qty: 0.5 ML | Refills: 0 | Status: SHIPPED | OUTPATIENT
Start: 2020-12-14 | End: 2020-12-14

## 2020-12-21 ENCOUNTER — TELEPHONE (OUTPATIENT)
Dept: NEUROLOGY | Facility: MEDICAL CENTER | Age: 45
End: 2020-12-21

## 2020-12-21 NOTE — TELEPHONE ENCOUNTER
12.21.2020   Left patient a voicemail regarding her voicemail she left me. Told her to give me a call back.

## 2020-12-22 ENCOUNTER — TELEPHONE (OUTPATIENT)
Dept: MEDICAL GROUP | Facility: IMAGING CENTER | Age: 45
End: 2020-12-22

## 2020-12-22 DIAGNOSIS — Z01.84 IMMUNITY STATUS TESTING: ICD-10-CM

## 2020-12-22 NOTE — TELEPHONE ENCOUNTER
Covering provider for Dr. Bebe Bradford at this time.     Reviewing 12/11/2020 Neurology recommended patient receive Prevnar 13 is not already received, assuming patient is a light tobacco smoker as reported in her health history and this is why it was recommended. Have patient schedule a nurse visit and/or she can receive it at her next appointment with Dr. Bradford in January. I will order a varicella titer and neurology and/or Dr. Bradford can advise her of her next step.  Corrina Quick, [id number]

## 2020-12-22 NOTE — TELEPHONE ENCOUNTER
"----- Message from Angelica Olmstead sent at 12/18/2020  1:34 PM PST -----  Regarding: Vaccines  Pt stopped by the office stating that the neurologist she was referred to wants her to have her pneumonia vaccine as well as the chicken pox titre (\"not varicella\"). She states that she went to the pharmacy but they wont administer it because \"she is not 50 years of age\". She is asking if that is something we can do here in the office for her. Please advise patient.    Thank you    "

## 2020-12-23 DIAGNOSIS — G35 MULTIPLE SCLEROSIS (HCC): Primary | ICD-10-CM

## 2020-12-23 RX ORDER — METHYLPREDNISOLONE SODIUM SUCCINATE 125 MG/2ML
125 INJECTION, POWDER, LYOPHILIZED, FOR SOLUTION INTRAMUSCULAR; INTRAVENOUS PRN
Status: CANCELLED | OUTPATIENT
Start: 2021-01-11

## 2020-12-23 RX ORDER — SODIUM CHLORIDE 9 MG/ML
INJECTION, SOLUTION INTRAVENOUS CONTINUOUS
Status: CANCELLED | OUTPATIENT
Start: 2021-01-11

## 2020-12-23 RX ORDER — 0.9 % SODIUM CHLORIDE 0.9 %
10 VIAL (ML) INJECTION PRN
Status: CANCELLED | OUTPATIENT
Start: 2021-01-11

## 2020-12-23 RX ORDER — EPINEPHRINE 1 MG/ML(1)
0.5 AMPUL (ML) INJECTION PRN
Status: CANCELLED | OUTPATIENT
Start: 2021-01-11

## 2020-12-23 RX ORDER — DIPHENHYDRAMINE HCL 25 MG
25 TABLET ORAL ONCE
Status: CANCELLED | OUTPATIENT
Start: 2021-01-11 | End: 2021-01-11

## 2020-12-23 RX ORDER — 0.9 % SODIUM CHLORIDE 0.9 %
3 VIAL (ML) INJECTION PRN
Status: CANCELLED | OUTPATIENT
Start: 2021-01-11

## 2020-12-23 RX ORDER — HEPARIN SODIUM (PORCINE) LOCK FLUSH IV SOLN 100 UNIT/ML 100 UNIT/ML
500 SOLUTION INTRAVENOUS PRN
Status: CANCELLED | OUTPATIENT
Start: 2021-01-11

## 2020-12-23 RX ORDER — ACETAMINOPHEN 325 MG/1
650 TABLET ORAL ONCE
Status: CANCELLED | OUTPATIENT
Start: 2021-01-11

## 2020-12-23 RX ORDER — 0.9 % SODIUM CHLORIDE 0.9 %
VIAL (ML) INJECTION PRN
Status: CANCELLED | OUTPATIENT
Start: 2021-01-11

## 2020-12-23 RX ORDER — DIPHENHYDRAMINE HYDROCHLORIDE 50 MG/ML
50 INJECTION INTRAMUSCULAR; INTRAVENOUS PRN
Status: CANCELLED | OUTPATIENT
Start: 2021-01-11

## 2020-12-23 RX ORDER — ONDANSETRON 2 MG/ML
8 INJECTION INTRAMUSCULAR; INTRAVENOUS EVERY 4 HOURS PRN
Status: CANCELLED | OUTPATIENT
Start: 2021-01-11

## 2020-12-23 RX ORDER — METHYLPREDNISOLONE SODIUM SUCCINATE 125 MG/2ML
100 INJECTION, POWDER, LYOPHILIZED, FOR SOLUTION INTRAMUSCULAR; INTRAVENOUS ONCE
Status: CANCELLED | OUTPATIENT
Start: 2021-01-11

## 2021-01-14 ENCOUNTER — TELEPHONE (OUTPATIENT)
Dept: MEDICAL GROUP | Facility: IMAGING CENTER | Age: 46
End: 2021-01-14

## 2021-03-10 ENCOUNTER — HOSPITAL ENCOUNTER (OUTPATIENT)
Facility: MEDICAL CENTER | Age: 46
End: 2021-03-10
Attending: FAMILY MEDICINE
Payer: COMMERCIAL

## 2021-03-10 ENCOUNTER — OFFICE VISIT (OUTPATIENT)
Dept: MEDICAL GROUP | Facility: IMAGING CENTER | Age: 46
End: 2021-03-10
Payer: COMMERCIAL

## 2021-03-10 VITALS
WEIGHT: 188 LBS | SYSTOLIC BLOOD PRESSURE: 122 MMHG | OXYGEN SATURATION: 96 % | DIASTOLIC BLOOD PRESSURE: 82 MMHG | BODY MASS INDEX: 28.49 KG/M2 | HEIGHT: 68 IN | TEMPERATURE: 98.1 F | HEART RATE: 88 BPM | RESPIRATION RATE: 16 BRPM

## 2021-03-10 DIAGNOSIS — Z13.0 SCREENING FOR DEFICIENCY ANEMIA: ICD-10-CM

## 2021-03-10 DIAGNOSIS — R87.610 ASCUS OF CERVIX WITH NEGATIVE HIGH RISK HPV: ICD-10-CM

## 2021-03-10 DIAGNOSIS — Z13.220 SCREENING FOR CHOLESTEROL LEVEL: ICD-10-CM

## 2021-03-10 DIAGNOSIS — N84.1 POLYP AT CERVICAL OS: ICD-10-CM

## 2021-03-10 DIAGNOSIS — Z13.1 SCREENING FOR DIABETES MELLITUS: ICD-10-CM

## 2021-03-10 DIAGNOSIS — Z12.31 ENCOUNTER FOR SCREENING MAMMOGRAM FOR MALIGNANT NEOPLASM OF BREAST: ICD-10-CM

## 2021-03-10 DIAGNOSIS — E55.9 VITAMIN D DEFICIENCY: ICD-10-CM

## 2021-03-10 DIAGNOSIS — Z23 NEED FOR VACCINATION: ICD-10-CM

## 2021-03-10 PROCEDURE — 90670 PCV13 VACCINE IM: CPT | Performed by: FAMILY MEDICINE

## 2021-03-10 PROCEDURE — 90750 HZV VACC RECOMBINANT IM: CPT | Performed by: FAMILY MEDICINE

## 2021-03-10 PROCEDURE — 99396 PREV VISIT EST AGE 40-64: CPT | Mod: 25 | Performed by: FAMILY MEDICINE

## 2021-03-10 PROCEDURE — 90472 IMMUNIZATION ADMIN EACH ADD: CPT | Performed by: FAMILY MEDICINE

## 2021-03-10 PROCEDURE — 90471 IMMUNIZATION ADMIN: CPT | Performed by: FAMILY MEDICINE

## 2021-03-10 ASSESSMENT — PATIENT HEALTH QUESTIONNAIRE - PHQ9
CLINICAL INTERPRETATION OF PHQ2 SCORE: 2
5. POOR APPETITE OR OVEREATING: 0 - NOT AT ALL
SUM OF ALL RESPONSES TO PHQ QUESTIONS 1-9: 4

## 2021-03-10 ASSESSMENT — FIBROSIS 4 INDEX: FIB4 SCORE: 0.81

## 2021-03-10 NOTE — PROGRESS NOTES
Subjective:     CC:   Chief Complaint   Patient presents with   • Immunizations     varicella, pneumonia   • Multiple Sclerosis       HPI:   Daskha Senior is a 45 y.o. female who presents for annual exam. She is feeling well and denies any complaints.    With abscess in the tooth scheduled for surgery.  Working with Dr. Fernando Esqueda neurology MS specialist and would like to change medications.  Patient requires a Shingrix and pneumococcal vaccine before being able to make this switch.    Ob-Gyn/ History:    Patient has GYN provider:no  Last Pap Smear: pap today.  Last year Pap with negative HPV with ASCUS on cytology  Current Contraceptive Method: has iud  No vaginal discharge   light periods regular almost monthly  Safe in relationship.   Depression Screening    Little interest or pleasure in doing things?  1 - several days   Feeling down, depressed , or hopeless? 1 - several days   Trouble falling or staying asleep, or sleeping too much?  0 - not at all   Feeling tired or having little energy?  1 - several days   Poor appetite or overeating?  0 - not at all   Feeling bad about yourself - or that you are a failure or have let yourself or your family down? 0 - not at all   Trouble concentrating on things, such as reading the newspaper or watching television? 1 - several days   Moving or speaking so slowly that other people could have noticed.  Or the opposite - being so fidgety or restless that you have been moving around a lot more than usual?  0 - not at all   Thoughts that you would be better off dead, or of hurting yourself?  0 - not at all   Patient Health Questionnaire Score: 4       If depressive symptoms identified deferred to follow up visit unless specifically addressed in assesment and plan.    Interpretation of PHQ-9 Total Score   Score Severity   1-4 No Depression   5-9 Mild Depression   10-14 Moderate Depression   15-19 Moderately Severe Depression   20-27 Severe Depression    Health  Maintenance  Diet: healthy  Exercise: active  Substance Abuse: 1 glass of wine 2 times per week  Seat belts, bike helmet, gun safety discussed.  Sun protection used.    Cancer screening  Colorectal Cancer Screening: she had an early colposcopy. Father passed of colon cancer 67 year. No early screening needed.   Lung Cancer Screening: light smoker  Breast Cancer Screening: will order    Infectious disease screening/Immunizations  prevnar 13 and shinrex    She  has a past medical history of GERD (gastroesophageal reflux disease).  She  has a past surgical history that includes primary c section and gastric banding laparoscopic.    Family History   Problem Relation Age of Onset   • Cancer Father 68        Colon   • Thyroid Mother    • Alcohol/Drug Brother    • Cancer Maternal Grandmother         Breast   • Other Paternal Grandmother         Macular degeneration   • Allergies Son    • No Known Problems Daughter    • No Known Problems Daughter        Social History     Socioeconomic History   • Marital status:      Spouse name: Not on file   • Number of children: Not on file   • Years of education: Not on file   • Highest education level: Not on file   Occupational History   • Not on file   Tobacco Use   • Smoking status: Light Tobacco Smoker     Types: Cigarettes   • Smokeless tobacco: Never Used   • Tobacco comment: socially <1 pack a week.   Substance and Sexual Activity   • Alcohol use: Yes     Alcohol/week: 2.4 oz     Types: 4 Glasses of wine per week   • Drug use: No   • Sexual activity: Yes     Partners: Male     Birth control/protection: I.U.D.   Other Topics Concern   • Not on file   Social History Narrative   • Not on file     Social Determinants of Health     Financial Resource Strain:    • Difficulty of Paying Living Expenses:    Food Insecurity:    • Worried About Running Out of Food in the Last Year:    • Ran Out of Food in the Last Year:    Transportation Needs:    • Lack of Transportation  (Medical):    • Lack of Transportation (Non-Medical):    Physical Activity:    • Days of Exercise per Week:    • Minutes of Exercise per Session:    Stress:    • Feeling of Stress :    Social Connections:    • Frequency of Communication with Friends and Family:    • Frequency of Social Gatherings with Friends and Family:    • Attends Scientologist Services:    • Active Member of Clubs or Organizations:    • Attends Club or Organization Meetings:    • Marital Status:    Intimate Partner Violence:    • Fear of Current or Ex-Partner:    • Emotionally Abused:    • Physically Abused:    • Sexually Abused:        Patient Active Problem List    Diagnosis Date Noted   • Lateral pain of right hip 08/03/2020   • Bilateral hearing loss due to cerumen impaction 12/16/2019   • Scalp psoriasis 08/07/2019   • Other complications of other bariatric procedure 07/17/2019   • Vitamin D deficiency 03/21/2019   • Multiple sclerosis (HCC) 03/20/2019   • Smoker 03/19/2019   • New daily persistent headache 03/19/2019   • Chronic tension-type headache, intractable 03/05/2019   • Iron deficiency anemia secondary to inadequate dietary iron intake 01/20/2019   • Hiatal hernia with GERD 11/01/2018   • Anxiety 11/01/2018   • Tobacco use disorder, mild, in controlled environment 11/01/2018   • Preventative health care 11/01/2018         Current Outpatient Medications   Medication Sig Dispense Refill   • Tretinoin 0.05 % Lotion Apply 1 Application topically every day. 20 g 3   • vitamin D (CHOLECALCIFEROL) 1000 Unit (25 mcg) Tab Take 1,000 Units by mouth every day.     • therapeutic multivitamin-minerals (THERAGRAN-M) Tab Take 1 Tab by mouth every day.     • omeprazole (PRILOSEC) 40 MG delayed-release capsule Take 1 Cap by mouth every day. 90 Cap 1   • fluocinonide (LIDEX) 0.05 % external solution APPLY A THIN LAYER TO  AFFECTED AREA(S) TOPICALLY  AS DIRECTED FOR 2 WEEKS OR  LESS TO THE RESOLUTION OF  SYMPTOMS 60 mL 0   • amitriptyline (ELAVIL) 25 MG  "Tab TAKE 2 TABLETS BY MOUTH AT BEDTIME AS NEEDED 30 Tab 0   • Dimethyl Fumarate (TECFIDERA) 240 MG CAPSULE DELAYED RELEASE Take  by mouth.     • Clemastine Fumarate 2.68 MG Tab Take 2 Tabs by mouth 2 Times a Day. (Patient taking differently: Take 2 Tabs by mouth every evening.) 120 Tab 2   • ibuprofen (MOTRIN) 200 MG Tab Take 200-600 mg by mouth every 8 hours as needed.       No current facility-administered medications for this visit.     No Known Allergies    Review of Systems  Constitutional: Negative for fever, chills, unexplained weight loss, night sweats  HENT: Negative for congestion.    Eyes: Negative for pain or sudden vision changes   Respiratory: Negative for cough and shortness of breath.    Cardiovascular: Negative for leg swelling or chest pain  Gastrointestinal: Negative for nausea, vomiting, abdominal pain and diarrhea.   Genitourinary: Negative for dysuria and hematuria.   Skin: Negative for rash or concerning moles   Neurological: Negative for dizziness, focal weakness and headaches.   Psychiatric/Behavioral: Negative for depression.  The patient is not nervous/anxious.      Objective:     /82 (BP Location: Left arm, Patient Position: Sitting, BP Cuff Size: Adult)   Pulse 88   Temp 36.7 °C (98.1 °F)   Resp 16   Ht 1.727 m (5' 7.99\")   Wt 85.3 kg (188 lb)   LMP 03/08/2021   SpO2 96%   BMI 28.59 kg/m²   Body mass index is 28.59 kg/m².  Wt Readings from Last 4 Encounters:   03/10/21 85.3 kg (188 lb)   12/11/20 84.4 kg (186 lb 1.1 oz)   12/01/20 83.9 kg (185 lb)   08/03/20 81.2 kg (179 lb)       Physical Exam:  Constitutional: Well-developed and well-nourished. Not diaphoretic. No distress.   Skin: Skin is warm and dry. No rash noted.  Head: Atraumatic without lesions.  Eyes: Conjunctivae and extraocular motions are normal. Pupils are equal, round, and reactive to light and accomodation. No scleral icterus.   Ears:  External ears unremarkable b/l. Tympanic membranes clear and intact " b/l  Mouth/Throat: deferred during covid 19 pandemic asked patient if any concerns lesions or questions  Neck: Supple, trachea midline. Normal range of motion. No thyromegaly present. No lymphadenopathy--cervical or supraclavicular.  Cardiovascular: Regular rate and rhythm, S1 and S2 without murmur, rubs, or gallops.  Lungs: Normal inspiratory effort, CTA bilaterally, no wheezes/rhonchi/rales  Breast: defers  Abdomen: Soft, non tender, and without distention. Active normal bowel sounds. No rebound, guarding, masses or HSM.  :Perineum and external genitalia normal without rash. Vagina with normal and physiologic discharge. Cervix with visible cyst/polyp at the os no discharge. With blood currently menstruating. pelvic exam without adnexal masses or cervical motion tenderness.  Extremities: No cyanosis, clubbing, erythema, nor edema. Distal pulses intact and symmetric.   Musculoskeletal: All major joints AROM full in all directions without pain.  Neurological: Alert and oriented x 3.  Psychiatric:  Behavior, mood, and affect are appropriate.    A chaperone was offered to the patient during today's exam. Chaperone name: Chandler Azar was present.    Assessment and Plan:     No problems updated.  Problem List Items Addressed This Visit     Vitamin D deficiency    Relevant Orders    VITAMIN D,25 HYDROXY      Other Visit Diagnoses     ASCUS of cervix with negative high risk HPV        Relevant Orders    THINPREP PAP W/HPV     Screening for deficiency anemia        Relevant Orders    CBC WITH DIFFERENTIAL    Encounter for screening mammogram for malignant neoplasm of breast        Relevant Orders    MA-SCREENING MAMMO BILAT W/TOMOSYNTHESIS W/CAD    Screening for diabetes mellitus        Relevant Orders    Comp Metabolic Panel    Screening for cholesterol level        Relevant Orders    Lipid Profile    Need for vaccination        Relevant Orders    Shingles Vaccine (Shingrix) (Completed)    Prevnar 13 PCV-13  (Completed)    Polyp at cervical os        Relevant Orders    REFERRAL TO GYNECOLOGY          Follow-up: PRN concerns and for annual screenings once per year    -Smoking cessation discussed if smoking and encouraged to come to office if quit and tempted or restarts smoking if pertinent to this patient. We discourage use of electronic smoking devises.   -Discussed healthy drinking habits if over 7 for females, 14 for males per week or more than 4 in one day.  -Discussed healthy eating habits, exercise, being physically active, healthy bmi below 25  -patient to provide ages of family members when they were diagnosed with cancer , especially breast and ovarian, pancreatic cancers.  -Reviewed pap history in female patients, if they have a gynecologist they are encouraged to follow up with that doctor for annual pelvic and breast exams. If they prefer to see me for women's health, I will perform pap smear with hpv dna testing, pelvic, and breast exam, these and male genital exams are always done in the presence of a medical assistant and with verbal permission from the patient.   -Colonoscopy history reviewed with those over 46yo or those with early family h/o colon cancer. If patient is due we provide them with various colon cancer screen modalities and relevant information to help the patient decide which is best for them.   -Mammograms recommended yearly for women over 41yo. Risks and benefits are reviewed and discussed with the patient and mammogram script provided.   -PSA discussed with males with family history of prostate cancer or those concerned. The decision to order this test is made with the patient.   -If patient prescribed medicines then told to review package insert for any warnings, side effects, contra-indications and medication vs medication reactions.   -STD testing added to lab work due to patients age per guideline recommendations  -Patients screened for anxiety and depression. If positive screening  patients are offered behavioral health services, medications, and tools to improve mood.   -to improve bone health take calcium and vitamin D, perform weight-bearing exercise, in addition we can discuss additional medications if needed including bisphosphonates, parathyroid hormone, and raloxifene.  Esophageal irritation can occur with bisphosphonate therapy this can be reduced by not laying down for 30 min after taking and taking with a full glass of water  -if wearing nail polish on toes or hands asked to rto if there are any dark brown or black areas under the nails  If lab tests ordered, then patient instructed to go to lab/location/plan  If imaging tests ordered, then patient instructed to go to radiology/location/plan  If medicines ordered, then patient instructed to go to pharmacy/location/plan  Health maintenance I reviewed both men and women's health maintenance  Leading causes of death are motor vehicle accidents, cardiovascular disease, malignant tumors, and HIV.   Breast and ovarian cancer mutation screening was suggested if there was an increased risk for the patient based on alona scoring.   -A general visit to see the eye doctor every one to two years was thought appropriate. Dentist ever 6-12 months.  -Immunization suggestions: Tetanus shot every 10 years, Influenza immunization pneumococcal- anyone with chronic illnesses

## 2021-03-31 ENCOUNTER — HOSPITAL ENCOUNTER (OUTPATIENT)
Facility: MEDICAL CENTER | Age: 46
End: 2021-03-31
Attending: FAMILY MEDICINE
Payer: COMMERCIAL

## 2021-03-31 DIAGNOSIS — R87.610 ASCUS OF CERVIX WITH NEGATIVE HIGH RISK HPV: ICD-10-CM

## 2021-03-31 LAB
FORWARD REASON: SPWHY: NORMAL
FORWARDED TO LAB: SPWHR: NORMAL
SPECIMEN SENT: SPWT1: NORMAL

## 2021-04-08 DIAGNOSIS — E55.9 VITAMIN D DEFICIENCY: ICD-10-CM

## 2021-04-08 DIAGNOSIS — Z13.220 SCREENING FOR CHOLESTEROL LEVEL: ICD-10-CM

## 2021-04-08 DIAGNOSIS — Z13.0 SCREENING FOR DEFICIENCY ANEMIA: ICD-10-CM

## 2021-04-08 DIAGNOSIS — Z13.1 SCREENING FOR DIABETES MELLITUS: ICD-10-CM

## 2021-04-13 ENCOUNTER — OFFICE VISIT (OUTPATIENT)
Dept: NEUROLOGY | Facility: MEDICAL CENTER | Age: 46
End: 2021-04-13
Attending: PSYCHIATRY & NEUROLOGY
Payer: COMMERCIAL

## 2021-04-13 VITALS
WEIGHT: 185.19 LBS | HEIGHT: 68 IN | BODY MASS INDEX: 28.07 KG/M2 | TEMPERATURE: 97.8 F | SYSTOLIC BLOOD PRESSURE: 118 MMHG | OXYGEN SATURATION: 99 % | DIASTOLIC BLOOD PRESSURE: 74 MMHG | HEART RATE: 87 BPM

## 2021-04-13 DIAGNOSIS — G43.009 MIGRAINE WITHOUT AURA AND WITHOUT STATUS MIGRAINOSUS, NOT INTRACTABLE: Primary | ICD-10-CM

## 2021-04-13 PROCEDURE — 99211 OFF/OP EST MAY X REQ PHY/QHP: CPT | Performed by: PSYCHIATRY & NEUROLOGY

## 2021-04-13 PROCEDURE — 99214 OFFICE O/P EST MOD 30 MIN: CPT | Performed by: PSYCHIATRY & NEUROLOGY

## 2021-04-13 RX ORDER — AMITRIPTYLINE HYDROCHLORIDE 50 MG/1
50 TABLET, FILM COATED ORAL EVERY EVENING
Qty: 90 TABLET | Refills: 3 | Status: SHIPPED | OUTPATIENT
Start: 2021-04-13 | End: 2021-07-12

## 2021-04-13 NOTE — PROGRESS NOTES
"[facility]  MULTIPLE SCLEROSIS & NEUROIMMUNOLOGY  FOLLOW-UP VISIT    DISEASE SUMMARY:  Principal neurologic diagnosis: MS  Diagnosis of MS: 3/2019  Disease History:  - 2/24/2019: onset of left-sided headache and left monocular visual \"grayness\" with pain with eye movements  - 3/2019: onset of visual disturbance in both eyes; admitted to hospital and treated with high-dose steroids w/ gradual improvement to baseline  - 5/2019: started Tecfidera  Disease course at onset: relapsing  Current disease course: relapsing  Previous disease therapies:  - none  Current disease therapies:  - Tecfidera  Symptomatic therapies:  - none  CSF:  - OCBs: 6  Other Testing:  - JCV Ab: positive  - anti-MOG Ab (7/8/2019): negative  MRI head:  - 11/5/2020: 1 enhancing lesion in the left temporal lobe  - 11/22/2019:   - 6/13/2019:   - 3/19/2019: typical lesions w/ enhancement  MRI cervical spine:  - 3/20/2019: no visible lesions  MRI thoracic spine:  - 4/11/2019: enhancing lesion at T10    CC: multiple sclerosis    INTERVAL HISTORY:  Daksha Senior is a 45 y.o. woman with multiple sclerosis.  I last saw her in the [facility] on 12/11/20210.  At that time I recommended she start Ocrevus.  Today, she was unaccompanied, and she provided the following interval history:    Daksha has received the SARS-CoV-2 and Prevnar 13 vaccines.  She has not yet completed the Shingrix vaccine series.  For this reason she has not yet scheduled her first doses of Ocrevus.  She supplements vitamin D.    Daksha has been under some stress lately.  She lost access to amitriptyline about 3 weeks ago.  Since that time her headaches have become a nearly daily occurrence.  Her family recently got a new dog (a goldendoodle puppy).  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  Marlee struggled with fatigue.  She goes " to bed around 22:00 or 23:00, and she gets up at 07:00.    MEDICATIONS:  Current Outpatient Medications   Medication Sig   • Probiotic Product (PROBIOTIC-10 PO) Take  by mouth.   • amitriptyline (ELAVIL) 50 MG Tab Take 1 tablet by mouth every evening for 90 days.   • Tretinoin 0.05 % Lotion Apply 1 Application topically every day.   • vitamin D (CHOLECALCIFEROL) 1000 Unit (25 mcg) Tab Take 1,000 Units by mouth every day.   • therapeutic multivitamin-minerals (THERAGRAN-M) Tab Take 1 Tab by mouth every day.   • omeprazole (PRILOSEC) 40 MG delayed-release capsule Take 1 Cap by mouth every day.   • fluocinonide (LIDEX) 0.05 % external solution APPLY A THIN LAYER TO  AFFECTED AREA(S) TOPICALLY  AS DIRECTED FOR 2 WEEKS OR  LESS TO THE RESOLUTION OF  SYMPTOMS   • Dimethyl Fumarate (TECFIDERA) 240 MG CAPSULE DELAYED RELEASE Take  by mouth.   • ibuprofen (MOTRIN) 200 MG Tab Take 200-600 mg by mouth every 8 hours as needed.     MEDICAL, SOCIAL, AND FAMILY HISTORY:  There is no change in the patient's ROS or PFSH from their previous visit on 12/11/2020.    REVIEW OF SYSTEMS:  A ROS was completed.  Pertinent positives and negatives were included in the HPI, above.  All other systems were reviewed and are negative.    PHYSICAL EXAM:  General/Medical:  - NAD  - hair, skin, nails, and joints were normal     Neuro:  MENTAL STATUS: awake and alert; no deficits of speech or language; oriented to person, place, and time; affect was appropriate to situation; pleasant, cooperative     CRANIAL NERVES:    II: acuity was: NT; fields grossly intact to confrontation; pupils: NT; discs: NT    III/IV/VI: versions intact without nystagmus    V: facial sensation symmetric to light touch    VII: facial expression symmetric    VIII: hearing intact to voice    IX/X: palate elevates symmetrically    XI: shoulder shrug symmetric    XII: tongue midline     MOTOR:  - bulk and tone were normal throughout  Upper Extremity Strength  (R/L)    5/5    Elbow flexion 5/5   Elbow extension 5/5   Shoulder abduction 5/5      Lower Extremity Strength  (R/L)   Hip flexion 5/5   Knee extension 5/5   Knee flexion 5/5   Ankle plantarflexion 5/5   Ankle dorsiflexion 5/5      - no abnormal movements     SENSATION:  - light touch: grossly intact  - vibration (R/L, seconds): NT at the great toes  - pinprick: NT  - proprioception: NT  - Romberg: absent     COORDINATION:  - finger to nose was normal, no ataxia on exam  - finger tapping was rapid and accurate, bilaterally     REFLEXES:  Reflex Right Left   BR NT NT   Biceps NT NT   Triceps NT NT   Patellae NT NT   Achilles NT NT   Toes NT NT      GAIT:  - narrow base and normal     QUANTITATIVE SCORES:  Timed 25-foot walk (sec): NT (3.5 on 12/11/2020)  Assistive device: none    REVIEW OF IMAGING STUDIES:  No new images since the last visit.    REVIEW OF LABORATORY STUDIES:  No pertinent data since the last visit.    ASSESSMENT:  Daksha Senior is a 45 y.o. woman with MS. Dustin continues on Tecfidera since she has not received her second dose of the Shingrix vaccine.  We discussed this, and I think it safe for her to start Ocrevus before she receives the second and final dose of the Shingrix vaccine.  I advised her to stop Tecfidera 2 to 4 weeks before the first dose of Ocrevus.  There has been struggling with more frequent headaches since she lost access to amitriptyline.  I provided her with a prescription for amitriptyline 50 mg nightly.  We will follow-up in approximately 3 months.    PLAN:  Multiple Sclerosis:  - ok to start ocrelizumab before finishing Shingrix series    Migraine without Aura:  - resume amitriptyline 50 mg nightly    Follow-Up:  - Return in about 3 months (around 7/13/2021).    Signed: Fernadno Flor M.D. at 12:56 PM on 04/13/21

## 2021-05-01 ENCOUNTER — OUTPATIENT INFUSION SERVICES (OUTPATIENT)
Dept: ONCOLOGY | Facility: MEDICAL CENTER | Age: 46
End: 2021-05-01
Attending: PSYCHIATRY & NEUROLOGY
Payer: COMMERCIAL

## 2021-05-01 VITALS
OXYGEN SATURATION: 97 % | TEMPERATURE: 97.4 F | BODY MASS INDEX: 27.47 KG/M2 | HEIGHT: 68 IN | SYSTOLIC BLOOD PRESSURE: 121 MMHG | WEIGHT: 181.22 LBS | DIASTOLIC BLOOD PRESSURE: 66 MMHG | RESPIRATION RATE: 18 BRPM | HEART RATE: 96 BPM

## 2021-05-01 DIAGNOSIS — G35 MULTIPLE SCLEROSIS (HCC): ICD-10-CM

## 2021-05-01 PROCEDURE — A9270 NON-COVERED ITEM OR SERVICE: HCPCS | Performed by: PSYCHIATRY & NEUROLOGY

## 2021-05-01 PROCEDURE — 96415 CHEMO IV INFUSION ADDL HR: CPT

## 2021-05-01 PROCEDURE — 96413 CHEMO IV INFUSION 1 HR: CPT

## 2021-05-01 PROCEDURE — 700102 HCHG RX REV CODE 250 W/ 637 OVERRIDE(OP): Performed by: PSYCHIATRY & NEUROLOGY

## 2021-05-01 PROCEDURE — 306780 HCHG STAT FOR TRANSFUSION PER CASE

## 2021-05-01 PROCEDURE — 96375 TX/PRO/DX INJ NEW DRUG ADDON: CPT

## 2021-05-01 PROCEDURE — 700105 HCHG RX REV CODE 258: Performed by: PSYCHIATRY & NEUROLOGY

## 2021-05-01 PROCEDURE — 700111 HCHG RX REV CODE 636 W/ 250 OVERRIDE (IP): Performed by: PSYCHIATRY & NEUROLOGY

## 2021-05-01 RX ORDER — 0.9 % SODIUM CHLORIDE 0.9 %
10 VIAL (ML) INJECTION PRN
Status: CANCELLED | OUTPATIENT
Start: 2021-05-15

## 2021-05-01 RX ORDER — METHYLPREDNISOLONE SODIUM SUCCINATE 125 MG/2ML
100 INJECTION, POWDER, LYOPHILIZED, FOR SOLUTION INTRAMUSCULAR; INTRAVENOUS ONCE
Status: CANCELLED | OUTPATIENT
Start: 2021-05-15

## 2021-05-01 RX ORDER — DIPHENHYDRAMINE HCL 25 MG
25 TABLET ORAL ONCE
Status: COMPLETED | OUTPATIENT
Start: 2021-05-01 | End: 2021-05-01

## 2021-05-01 RX ORDER — EPINEPHRINE 1 MG/ML(1)
0.5 AMPUL (ML) INJECTION PRN
Status: CANCELLED | OUTPATIENT
Start: 2021-05-15

## 2021-05-01 RX ORDER — ACETAMINOPHEN 325 MG/1
650 TABLET ORAL ONCE
Status: CANCELLED | OUTPATIENT
Start: 2021-05-15

## 2021-05-01 RX ORDER — SODIUM CHLORIDE 9 MG/ML
INJECTION, SOLUTION INTRAVENOUS CONTINUOUS
Status: CANCELLED | OUTPATIENT
Start: 2021-05-15

## 2021-05-01 RX ORDER — ONDANSETRON 2 MG/ML
8 INJECTION INTRAMUSCULAR; INTRAVENOUS EVERY 4 HOURS PRN
Status: CANCELLED | OUTPATIENT
Start: 2021-05-15

## 2021-05-01 RX ORDER — 0.9 % SODIUM CHLORIDE 0.9 %
3 VIAL (ML) INJECTION PRN
Status: CANCELLED | OUTPATIENT
Start: 2021-05-15

## 2021-05-01 RX ORDER — METHYLPREDNISOLONE SODIUM SUCCINATE 125 MG/2ML
125 INJECTION, POWDER, LYOPHILIZED, FOR SOLUTION INTRAMUSCULAR; INTRAVENOUS PRN
Status: CANCELLED | OUTPATIENT
Start: 2021-05-15

## 2021-05-01 RX ORDER — 0.9 % SODIUM CHLORIDE 0.9 %
VIAL (ML) INJECTION PRN
Status: CANCELLED | OUTPATIENT
Start: 2021-05-15

## 2021-05-01 RX ORDER — ACETAMINOPHEN 325 MG/1
650 TABLET ORAL ONCE
Status: COMPLETED | OUTPATIENT
Start: 2021-05-01 | End: 2021-05-01

## 2021-05-01 RX ORDER — SODIUM CHLORIDE 9 MG/ML
INJECTION, SOLUTION INTRAVENOUS CONTINUOUS
Status: DISCONTINUED | OUTPATIENT
Start: 2021-05-01 | End: 2021-05-01 | Stop reason: HOSPADM

## 2021-05-01 RX ORDER — HEPARIN SODIUM (PORCINE) LOCK FLUSH IV SOLN 100 UNIT/ML 100 UNIT/ML
500 SOLUTION INTRAVENOUS PRN
Status: CANCELLED | OUTPATIENT
Start: 2021-05-15

## 2021-05-01 RX ORDER — DIPHENHYDRAMINE HYDROCHLORIDE 50 MG/ML
50 INJECTION INTRAMUSCULAR; INTRAVENOUS PRN
Status: CANCELLED | OUTPATIENT
Start: 2021-05-15

## 2021-05-01 RX ORDER — DIPHENHYDRAMINE HCL 25 MG
25 TABLET ORAL ONCE
Status: CANCELLED | OUTPATIENT
Start: 2021-05-15 | End: 2021-05-15

## 2021-05-01 RX ORDER — METHYLPREDNISOLONE SODIUM SUCCINATE 125 MG/2ML
100 INJECTION, POWDER, LYOPHILIZED, FOR SOLUTION INTRAMUSCULAR; INTRAVENOUS ONCE
Status: COMPLETED | OUTPATIENT
Start: 2021-05-01 | End: 2021-05-01

## 2021-05-01 RX ADMIN — ACETAMINOPHEN 650 MG: 325 TABLET ORAL at 08:09

## 2021-05-01 RX ADMIN — METHYLPREDNISOLONE SODIUM SUCCINATE 100 MG: 125 INJECTION, POWDER, FOR SOLUTION INTRAMUSCULAR; INTRAVENOUS at 08:10

## 2021-05-01 RX ADMIN — OCRELIZUMAB 300 MG: 300 INJECTION INTRAVENOUS at 08:50

## 2021-05-01 RX ADMIN — DIPHENHYDRAMINE HYDROCHLORIDE 25 MG: 25 TABLET ORAL at 08:08

## 2021-05-01 NOTE — PROGRESS NOTES
Patient arrived ambulatory to [facility] for Day 1 Ocrevus.  Reviewed plan of care and medication side effects, she verbalized understanding.  PIV established with good blood return noted.  Pre medications given and Ocrevus infused per order, she tolerated well.  Pt monitored for 1 hour post with no s/s of reaction noted.  PIV flushed, removed, and gauze/coban.  Confirmed next appointment and she ambulated out of clinic in no apparent distress.

## 2021-05-07 ENCOUNTER — HOSPITAL ENCOUNTER (OUTPATIENT)
Dept: RADIOLOGY | Facility: MEDICAL CENTER | Age: 46
End: 2021-05-07
Attending: FAMILY MEDICINE
Payer: COMMERCIAL

## 2021-05-07 DIAGNOSIS — Z12.31 ENCOUNTER FOR SCREENING MAMMOGRAM FOR MALIGNANT NEOPLASM OF BREAST: ICD-10-CM

## 2021-05-07 PROCEDURE — 77063 BREAST TOMOSYNTHESIS BI: CPT

## 2021-05-15 ENCOUNTER — OUTPATIENT INFUSION SERVICES (OUTPATIENT)
Dept: ONCOLOGY | Facility: MEDICAL CENTER | Age: 46
End: 2021-05-15
Attending: PSYCHIATRY & NEUROLOGY
Payer: COMMERCIAL

## 2021-05-15 VITALS
RESPIRATION RATE: 18 BRPM | TEMPERATURE: 97.8 F | HEIGHT: 68 IN | HEART RATE: 92 BPM | SYSTOLIC BLOOD PRESSURE: 105 MMHG | DIASTOLIC BLOOD PRESSURE: 61 MMHG | BODY MASS INDEX: 28.63 KG/M2 | OXYGEN SATURATION: 100 % | WEIGHT: 188.93 LBS

## 2021-05-15 DIAGNOSIS — G35 MULTIPLE SCLEROSIS (HCC): ICD-10-CM

## 2021-05-15 PROCEDURE — 96375 TX/PRO/DX INJ NEW DRUG ADDON: CPT

## 2021-05-15 PROCEDURE — A9270 NON-COVERED ITEM OR SERVICE: HCPCS | Performed by: PSYCHIATRY & NEUROLOGY

## 2021-05-15 PROCEDURE — 700102 HCHG RX REV CODE 250 W/ 637 OVERRIDE(OP): Performed by: PSYCHIATRY & NEUROLOGY

## 2021-05-15 PROCEDURE — 700105 HCHG RX REV CODE 258: Performed by: PSYCHIATRY & NEUROLOGY

## 2021-05-15 PROCEDURE — 96413 CHEMO IV INFUSION 1 HR: CPT

## 2021-05-15 PROCEDURE — 306780 HCHG STAT FOR TRANSFUSION PER CASE

## 2021-05-15 PROCEDURE — 96415 CHEMO IV INFUSION ADDL HR: CPT

## 2021-05-15 PROCEDURE — 700111 HCHG RX REV CODE 636 W/ 250 OVERRIDE (IP): Performed by: PSYCHIATRY & NEUROLOGY

## 2021-05-15 RX ORDER — 0.9 % SODIUM CHLORIDE 0.9 %
10 VIAL (ML) INJECTION PRN
Status: CANCELLED | OUTPATIENT
Start: 2021-10-28

## 2021-05-15 RX ORDER — DIPHENHYDRAMINE HYDROCHLORIDE 50 MG/ML
50 INJECTION INTRAMUSCULAR; INTRAVENOUS PRN
Status: CANCELLED | OUTPATIENT
Start: 2021-10-28

## 2021-05-15 RX ORDER — ACETAMINOPHEN 325 MG/1
650 TABLET ORAL ONCE
Status: CANCELLED | OUTPATIENT
Start: 2021-10-28

## 2021-05-15 RX ORDER — DIPHENHYDRAMINE HCL 25 MG
25 TABLET ORAL ONCE
Status: COMPLETED | OUTPATIENT
Start: 2021-05-15 | End: 2021-05-15

## 2021-05-15 RX ORDER — METHYLPREDNISOLONE SODIUM SUCCINATE 125 MG/2ML
100 INJECTION, POWDER, LYOPHILIZED, FOR SOLUTION INTRAMUSCULAR; INTRAVENOUS ONCE
Status: CANCELLED | OUTPATIENT
Start: 2021-10-28

## 2021-05-15 RX ORDER — SODIUM CHLORIDE 9 MG/ML
INJECTION, SOLUTION INTRAVENOUS CONTINUOUS
Status: CANCELLED | OUTPATIENT
Start: 2021-10-28

## 2021-05-15 RX ORDER — EPINEPHRINE 1 MG/ML(1)
0.5 AMPUL (ML) INJECTION PRN
Status: CANCELLED | OUTPATIENT
Start: 2021-10-28

## 2021-05-15 RX ORDER — ONDANSETRON 2 MG/ML
8 INJECTION INTRAMUSCULAR; INTRAVENOUS EVERY 4 HOURS PRN
Status: CANCELLED | OUTPATIENT
Start: 2021-10-28

## 2021-05-15 RX ORDER — 0.9 % SODIUM CHLORIDE 0.9 %
3 VIAL (ML) INJECTION PRN
Status: CANCELLED | OUTPATIENT
Start: 2021-10-28

## 2021-05-15 RX ORDER — ACETAMINOPHEN 325 MG/1
650 TABLET ORAL ONCE
Status: COMPLETED | OUTPATIENT
Start: 2021-05-15 | End: 2021-05-15

## 2021-05-15 RX ORDER — METHYLPREDNISOLONE SODIUM SUCCINATE 125 MG/2ML
100 INJECTION, POWDER, LYOPHILIZED, FOR SOLUTION INTRAMUSCULAR; INTRAVENOUS ONCE
Status: COMPLETED | OUTPATIENT
Start: 2021-05-15 | End: 2021-05-15

## 2021-05-15 RX ORDER — 0.9 % SODIUM CHLORIDE 0.9 %
VIAL (ML) INJECTION PRN
Status: CANCELLED | OUTPATIENT
Start: 2021-10-28

## 2021-05-15 RX ORDER — DIPHENHYDRAMINE HCL 25 MG
25 TABLET ORAL ONCE
Status: CANCELLED | OUTPATIENT
Start: 2021-10-28 | End: 2021-10-28

## 2021-05-15 RX ORDER — METHYLPREDNISOLONE SODIUM SUCCINATE 125 MG/2ML
125 INJECTION, POWDER, LYOPHILIZED, FOR SOLUTION INTRAMUSCULAR; INTRAVENOUS PRN
Status: CANCELLED | OUTPATIENT
Start: 2021-10-28

## 2021-05-15 RX ORDER — HEPARIN SODIUM (PORCINE) LOCK FLUSH IV SOLN 100 UNIT/ML 100 UNIT/ML
500 SOLUTION INTRAVENOUS PRN
Status: CANCELLED | OUTPATIENT
Start: 2021-10-28

## 2021-05-15 RX ADMIN — ACETAMINOPHEN 650 MG: 325 TABLET ORAL at 08:40

## 2021-05-15 RX ADMIN — DIPHENHYDRAMINE HYDROCHLORIDE 25 MG: 25 TABLET ORAL at 08:41

## 2021-05-15 RX ADMIN — METHYLPREDNISOLONE SODIUM SUCCINATE 100 MG: 125 INJECTION, POWDER, FOR SOLUTION INTRAMUSCULAR; INTRAVENOUS at 08:41

## 2021-05-15 RX ADMIN — OCRELIZUMAB 300 MG: 300 INJECTION INTRAVENOUS at 09:10

## 2021-05-15 NOTE — PROGRESS NOTES
Pt presented to infusion center for second loading dose of Ocrevus. POC discussed, including estimated treatment time and hour of observation, pt agreeable. Pt denies any current s/s of infection or open wounds/sores. PIV started, brisk blood return observed. Pre-meds given as ordered. Ocrevus infused and titrated per pharmacy instructions to max rate of 180 ml/hr. Pt tolerated well with no s/s of adverse reaction. 1 hour obs completed with no s/s of adverse reaction. PIV flushed and removed, gauze and coban dressing placed. Pt has next appt, left on foot to self care.

## 2021-05-21 ENCOUNTER — TELEPHONE (OUTPATIENT)
Dept: NEUROLOGY | Facility: MEDICAL CENTER | Age: 46
End: 2021-05-21

## 2021-05-21 NOTE — TELEPHONE ENCOUNTER
The pt called asking for a referral to be [facility] neurology stating since she has [employer] insurance and they are ending their contract starting June first with [facility].

## 2021-07-16 DIAGNOSIS — K21.9 GASTROESOPHAGEAL REFLUX DISEASE, UNSPECIFIED WHETHER ESOPHAGITIS PRESENT: ICD-10-CM

## 2021-07-19 RX ORDER — OMEPRAZOLE 40 MG/1
CAPSULE, DELAYED RELEASE ORAL
Qty: 90 CAPSULE | Refills: 3 | Status: SHIPPED | OUTPATIENT
Start: 2021-07-19 | End: 2024-02-15

## 2021-07-20 ENCOUNTER — OFFICE VISIT (OUTPATIENT)
Dept: NEUROLOGY | Facility: MEDICAL CENTER | Age: 46
End: 2021-07-20
Attending: PSYCHIATRY & NEUROLOGY
Payer: COMMERCIAL

## 2021-07-20 VITALS
SYSTOLIC BLOOD PRESSURE: 124 MMHG | TEMPERATURE: 97.9 F | HEIGHT: 68 IN | BODY MASS INDEX: 28.57 KG/M2 | DIASTOLIC BLOOD PRESSURE: 82 MMHG | HEART RATE: 100 BPM | WEIGHT: 188.49 LBS | OXYGEN SATURATION: 97 %

## 2021-07-20 DIAGNOSIS — G35 MULTIPLE SCLEROSIS (HCC): Primary | ICD-10-CM

## 2021-07-20 PROCEDURE — 99215 OFFICE O/P EST HI 40 MIN: CPT | Performed by: PSYCHIATRY & NEUROLOGY

## 2021-07-20 PROCEDURE — 99211 OFF/OP EST MAY X REQ PHY/QHP: CPT | Performed by: PSYCHIATRY & NEUROLOGY

## 2021-07-20 RX ORDER — DOXYCYCLINE HYCLATE 20 MG
TABLET ORAL
COMMUNITY
Start: 2021-06-28 | End: 2021-11-20

## 2021-07-20 RX ORDER — AMITRIPTYLINE HYDROCHLORIDE 50 MG/1
TABLET, FILM COATED ORAL
COMMUNITY
Start: 2021-07-16 | End: 2022-01-28

## 2021-07-20 NOTE — PROGRESS NOTES
"[facility]  MULTIPLE SCLEROSIS & NEUROIMMUNOLOGY  FOLLOW-UP VISIT    DISEASE SUMMARY:  Principal neurologic diagnosis: MS  Diagnosis of MS: 3/2019  Disease History:  - 2/24/2019: onset of left-sided headache and left monocular visual \"grayness\" with pain with eye movements  - 3/2019: onset of visual disturbance in both eyes; admitted to hospital and treated with high-dose steroids w/ gradual improvement to baseline  - 5/2019: started Tecfidera  - 5/1, 5/15/2021: started Ocrevus  Disease course at onset: relapsing  Current disease course: relapsing  Previous disease therapies:  - Tecfidera  Current disease therapies:  - Ocrevus  Symptomatic therapies:  - none  CSF:  - OCBs: 6  Other Testing:  - JCV Ab: positive  - anti-MOG Ab (7/8/2019): negative  MRI head:  - 11/5/2020: 1 enhancing lesion in the left temporal lobe  - 11/22/2019:   - 6/13/2019:   - 3/19/2019: typical lesions w/ enhancement  MRI cervical spine:  - 3/20/2019: no visible lesions  MRI thoracic spine:  - 4/11/2019: enhancing lesion at T10    CC: multiple sclerosis    INTERVAL HISTORY:  Daksha Senior is a 45 y.o. woman with multiple sclerosis.  I last saw her in the MS Clinic on 4/13/2021.  At that time we planned to proceed with Ocrevus.  Today, she was unaccompanied, and she provided the following interval history:    Daksha tolerated the first infusions of Ocrevus well.  She did notice fatigue for about 1 week following each treatment.  Since that time, she has not noticed any new or worsened neurologic symptoms.  Daksha wonders about her cognition.    Her headaches have become much less frequent with the reinstitution of amitriptyline.  She tolerates 50 mg nightly well.    She has not yet completed Shingrix vaccination.    MEDICATIONS:  Current Outpatient Medications   Medication Sig   • amitriptyline (ELAVIL) 50 MG Tab    • doxycycline (PERIOSTAT) 20 MG tablet TAKE 1 TABLET BY MOUTH TWICE DAILY UNTIL ALL TAKEN   • Ocrelizumab (OCREVUS IV) Infuse " 600 mg into a venous catheter every 6 months.   • omeprazole (PRILOSEC) 40 MG delayed-release capsule TAKE 1 CAPSULE BY MOUTH  DAILY   • Probiotic Product (PROBIOTIC-10 PO) Take  by mouth.   • Tretinoin 0.05 % Lotion Apply 1 Application topically every day.   • vitamin D (CHOLECALCIFEROL) 1000 Unit (25 mcg) Tab Take 1,000 Units by mouth every day.   • therapeutic multivitamin-minerals (THERAGRAN-M) Tab Take 1 Tab by mouth every day.   • fluocinonide (LIDEX) 0.05 % external solution APPLY A THIN LAYER TO  AFFECTED AREA(S) TOPICALLY  AS DIRECTED FOR 2 WEEKS OR  LESS TO THE RESOLUTION OF  SYMPTOMS   • ibuprofen (MOTRIN) 200 MG Tab Take 200-600 mg by mouth every 8 hours as needed.     MEDICAL, SOCIAL, AND FAMILY HISTORY:  There is no change in the patient's ROS or PFSH from their previous visit on 4/13/2021.    REVIEW OF SYSTEMS:  A ROS was completed.  Pertinent positives and negatives were included in the HPI, above.  All other systems were reviewed and are negative.    PHYSICAL EXAM:  General/Medical:  - NAD  - hair, skin, nails, and joints were normal     Neuro:  MENTAL STATUS: awake and alert; no deficits of speech or language; oriented to person, place, and time; affect was appropriate to situation; pleasant, cooperative     CRANIAL NERVES:    II: acuity was: NT, fields: NT, pupils: NT, discs: NT    III/IV/VI: versions grossly intact    V: facial sensation: NT    VII: facial expression: NT    VIII: hearing: intact to voice    IX/X: palate: NT    XI: shoulder shrug: symmetric    XII: tongue midline     MOTOR:  - bulk: normal  - tone: NT  Upper Extremity Strength  (R/L)    NT   Elbow flexion NT   Elbow extension NT   Shoulder abduction NT      Lower Extremity Strength  (R/L)   Hip flexion NT   Knee extension NT   Knee flexion NT   Ankle plantarflexion NT   Ankle dorsiflexion NT     - no abnormal movements    SENSATION:  - light touch: grossly intact  - vibration (R/L, seconds): NT at the great toes  - pinprick:  "NT  - proprioception: NT  - Romberg: NT    COORDINATION:  - finger to nose was: NT  - finger tapping was: NT    REFLEXES:  Reflex Right Left   BR NT NT   Biceps NT NT   Triceps NT NT   Patellae NT NT   Achilles NT NT   Toes NT NT      GAIT:  - narrow base and normal     QUANTITATIVE SCORES:  Timed 25-foot walk (sec): NT (3.5 on 12/11/2020)  Assistive device: none    REVIEW OF IMAGING STUDIES:  No new images since the last visit.    REVIEW OF LABORATORY STUDIES:  No pertinent data since the last visit.    ASSESSMENT:  Daksha Senior is a 45 y.o. woman with MS.  She remains clinically stable on Ocrevus, which she tolerates well.  Plan for repeat MRI brain without with contrast later this year in order to establish new baseline on Ocrevus.  Daksha has some concerns about her cognition.  We discussed this at length.  This may be related to amitriptyline which she takes for migraine prevention.  She will try dosing earlier in the evening in order to avoid morning side effects.  She requested neuropsychological evaluation in order to establish baseline cognitive status.  We will follow-up in approximately 6 months.    PLAN:  Multiple Sclerosis:  - continue Ocrevus  - neuro-psychological evaluation for baseline cognitive status    Migraine without Aura:  - continue amitriptyline 50 mg nightly  - could consider decreasing the dosage or dosing earlier in the evening    Follow-Up:  - Return in about 6 months (around 1/20/2022).    Signed: Fernando Flor M.D.    BILLING DOCUMENTATION:   I spent 48 minutes reviewing the medical record, interviewing and examining the patient, discussing my impression (see \"assessment\" above), and coordinating care.  "

## 2021-07-26 ENCOUNTER — TELEPHONE (OUTPATIENT)
Dept: NEUROLOGY | Facility: MEDICAL CENTER | Age: 46
End: 2021-07-26

## 2021-07-26 NOTE — TELEPHONE ENCOUNTER
Fernando Flor M.D.  Macrina Espitia, Med Ass't  Good afternoon, Macrina:     Would you please send the letter I just wrote to:     [employer], [address] [address], [facility] [address]     Thanks!     Mailed letter to pt

## 2021-08-11 NOTE — ED PROVIDER NOTES
CHIEF COMPLAINT  Chief Complaint   Patient presents with   • Chest Pain       HPI (1,4)  Daksha Senior is a 43 y.o. female with medical history of gastroesophageal reflux disease, sleeve gastrectomy, anxiety and hiatal hernia who presents to the emergency department because of chest pain, started around 8 PM while she was sitting and watching TV, retrosternal, squeezing/crushing in nature, 8/10 in severity, currently rating the pain as 1-2/10, radiating to the shoulder, jaw and back, associated with shortness of breath and sweating, pain improved after nitroglycerin and fentanyl.  Denies nausea, vomiting and palpitation.  She is a smoker smokes 5 cigarettes/day on and off for 20 years.  No family history of heart disease, had a lipid profile recently which was within normal, drinks alcohol 1-2 times per week, use IUD for contraception, denies leg swelling, leg pain and recent travel.       REVIEW OF SYSTEMS(2/10)  Pertinent positives include: Chest pain and sweating.  Pertinent negatives include: Nausea, vomiting, shortness of breath, abdominal pain, leg swelling and leg pain.   All other systems are negative.     PAST MEDICAL HISTORY(PFS1,2)  History reviewed. No pertinent past medical history.    FAMILY HISTORY  Family History   Problem Relation Age of Onset   • Cancer Father        SOCIAL HISTORY  Social History   Substance Use Topics   • Smoking status: Light Tobacco Smoker     Types: Cigarettes   • Smokeless tobacco: Never Used      Comment: socially   • Alcohol use 2.4 - 4.8 oz/week     2 - 4 Glasses of wine, 2 - 4 Cans of beer per week     History   Drug Use No       SURGICAL HISTORY  History reviewed. No pertinent surgical history.    CURRENT MEDICATIONS  Home Medications     Reviewed by Martine Lira R.N. (Registered Nurse) on 01/19/19 at 224  Med List Status: Complete   Medication Last Dose Status   B Complex Vitamins (VITAMIN-B COMPLEX PO) 1/19/2019 Active   Multiple Vitamins-Minerals  "(MULTIVITAMIN ADULTS PO) 1/19/2019 Active   omeprazole (PRILOSEC) 20 MG delayed-release capsule as needed Active                ALLERGIES  No Known Allergies    PHYSICAL EXAM (2,8)  VITAL SIGNS: /62   Pulse 83   Temp 37.2 °C (99 °F) (Temporal)   Resp 12   Ht 1.727 m (5' 8\")   Wt 78 kg (172 lb)   SpO2 99%   BMI 26.15 kg/m²  Reviewed   Constitutional: Oriented to person, place, and time and well-developed, well-nourished, and in no distress. Vital signs are normal.   HENT:   Head: Normocephalic and atraumatic.   Mouth/Throat: Oropharynx is clear and moist.   Eyes: Conjunctivae are normal.   Neck: Neck supple. No JVD present. No tracheal deviation present.   Cardiovascular: Normal rate.  Exam reveals no gallop and no friction rub.No murmur heard.  Pulmonary/Chest: Effort normal and breath sounds normal. No respiratory distress. She has no wheezes. She has no rales. She exhibits no tenderness.   Abdominal: Soft. Bowel sounds are normal. She exhibits no mass. There is no tenderness. There is no rebound and no guarding.   Musculoskeletal: Normal range of motion. She exhibits no edema.   Lymphadenopathy:     She has no cervical adenopathy.   Neurological: She is alert and oriented to person, place, and time. She has normal strength.  Skin: No rash noted. No erythema. No pallor.     DIFFERENTIAL DIAGNOSIS:  ACS, angina, esophageal spasm, gastroesophageal reflux disease, costochondritis and pleurisy.    EKG  Reviewed, interpretation attached below..    RADIOLOGY/PROCEDURES  DX-CHEST-LIMITED (1 VIEW)   Final Result      No radiographic evidence of acute cardiopulmonary process.      NM-CARDIAC STRESS TEST    (Results Pending)       LABORATORY: Reviewed as below.  Results for orders placed or performed during the hospital encounter of 01/19/19   Troponin   Result Value Ref Range    Troponin I <0.01 0.00 - 0.04 ng/mL   CBC with Differential   Result Value Ref Range    WBC 12.5 (H) 4.8 - 10.8 K/uL    RBC 4.09 (L) " 4.20 - 5.40 M/uL    Hemoglobin 11.7 (L) 12.0 - 16.0 g/dL    Hematocrit 35.6 (L) 37.0 - 47.0 %    MCV 87.0 81.4 - 97.8 fL    MCH 28.6 27.0 - 33.0 pg    MCHC 32.9 (L) 33.6 - 35.0 g/dL    RDW 42.9 35.9 - 50.0 fL    Platelet Count 310 164 - 446 K/uL    MPV 10.6 9.0 - 12.9 fL    Neutrophils-Polys 65.40 44.00 - 72.00 %    Lymphocytes 24.40 22.00 - 41.00 %    Monocytes 8.10 0.00 - 13.40 %    Eosinophils 1.40 0.00 - 6.90 %    Basophils 0.50 0.00 - 1.80 %    Immature Granulocytes 0.20 0.00 - 0.90 %    Nucleated RBC 0.00 /100 WBC    Neutrophils (Absolute) 8.19 (H) 2.00 - 7.15 K/uL    Lymphs (Absolute) 3.06 1.00 - 4.80 K/uL    Monos (Absolute) 1.02 (H) 0.00 - 0.85 K/uL    Eos (Absolute) 0.18 0.00 - 0.51 K/uL    Baso (Absolute) 0.06 0.00 - 0.12 K/uL    Immature Granulocytes (abs) 0.03 0.00 - 0.11 K/uL    NRBC (Absolute) 0.00 K/uL   Complete Metabolic Panel (CMP)   Result Value Ref Range    Sodium 139 135 - 145 mmol/L    Potassium 3.8 3.6 - 5.5 mmol/L    Chloride 107 96 - 112 mmol/L    Co2 24 20 - 33 mmol/L    Anion Gap 8.0 0.0 - 11.9    Glucose 120 (H) 65 - 99 mg/dL    Bun 19 8 - 22 mg/dL    Creatinine 0.81 0.50 - 1.40 mg/dL    Calcium 9.4 8.5 - 10.5 mg/dL    AST(SGOT) 16 12 - 45 U/L    ALT(SGPT) 16 2 - 50 U/L    Alkaline Phosphatase 33 30 - 99 U/L    Total Bilirubin 0.4 0.1 - 1.5 mg/dL    Albumin 4.1 3.2 - 4.9 g/dL    Total Protein 6.7 6.0 - 8.2 g/dL    Globulin 2.6 1.9 - 3.5 g/dL    A-G Ratio 1.6 g/dL   Lipase   Result Value Ref Range    Lipase 37 11 - 82 U/L   BETA-HCG QUALITATIVE SERUM   Result Value Ref Range    Beta-Hcg Qualitative Serum Negative Negative   ESTIMATED GFR   Result Value Ref Range    GFR If African American >60 >60 mL/min/1.73 m 2    GFR If Non African American >60 >60 mL/min/1.73 m 2   EKG   Result Value Ref Range    Report       [facility] Emergency Dept.    Test Date:  2019-01-19  Pt Name:    MONE CAO            Department: ER  MRN:        [id number]                      Room:        BL 22  Gender:     Female                       Technician: [id number]  :        1975                   Requested By:ER TRIAGE PROTOCOL  Order #:    [id number]                    Reading MD:    Measurements  Intervals                                Axis  Rate:       88                           P:          72  OH:         160                          QRS:        66  QRSD:       88                           T:          47  QT:         376  QTc:        455    Interpretive Statements  SINUS RHYTHM  LOW VOLTAGE THROUGHOUT  No previous ECG available for comparison         INTERVENTIONS:  Medications   aspirin (ASA) tablet 325 mg (not administered)     Or   aspirin (ASA) chewable tab 324 mg (not administered)     Or   aspirin (ASA) suppository 300 mg (not administered)   senna-docusate (PERICOLACE or SENOKOT S) 8.6-50 MG per tablet 2 Tab (not administered)     And   polyethylene glycol/lytes (MIRALAX) PACKET 1 Packet (not administered)     And   magnesium hydroxide (MILK OF MAGNESIA) suspension 30 mL (not administered)     And   bisacodyl (DULCOLAX) suppository 10 mg (not administered)   heparin injection 5,000 Units (not administered)   ondansetron (ZOFRAN) syringe/vial injection 4 mg (not administered)   ondansetron (ZOFRAN ODT) dispertab 4 mg (not administered)   promethazine (PHENERGAN) tablet 12.5-25 mg (not administered)   promethazine (PHENERGAN) suppository 12.5-25 mg (not administered)   prochlorperazine (COMPAZINE) injection 5-10 mg (not administered)   Pharmacy Consult Request ...Pain Management Review 1 Each (not administered)     And   oxyCODONE immediate-release (ROXICODONE) tablet 5 mg (not administered)     And   oxyCODONE immediate-release (ROXICODONE) tablet 10 mg (not administered)     And   HYDROmorphone pf (DILAUDID) injection 0.5 mg (not administered)   omeprazole (PRILOSEC) capsule 20 mg (not administered)   aspirin (ASA) tablet 325 mg (325 mg Oral Given 19 0005)   .  Response:  Improved..    COURSE & MEDICAL DECISION MAKING  43 years old female with medical history of GERD, hiatal hernia presented with chest pain started around 8 PM today, chest pain description is crushing/squeezing with radiation to the shoulder, jaw and back, no cardiac past medical history, no family history of heart disease, her only risk factors of smoking and alcohol, had a normal lipid profile recently, her physical examination within normal, EKG showed sinus rhythm with no acute ST segment changes, physical examination within normal, ordered CBC, CMP, troponin, pregnancy test, chest x-ray and lipase.  Her PERC score is 0, PE cannot be ruled out.    12:12 AM lab work reviewed, pregnancy test negative, CMP within normal, CBC significant for borderline anemia and WBC count, lipase and troponin within normal.  Discussed with the hospitalist, patient will be admitted to the hospital for chest pain rule out probably stress test tomorrow morning.      PLAN:  -Admit to CDU.  -Monitor EKG and troponin.  -Stress test in the morning.    CONDITION: Stable.    FINAL IMPRESSION  1. Acute chest pain Acute         Electronically signed by: Phyllis Serrano, 1/19/2019 11:27 PM       Rituxan Counseling:  I discussed with the patient the risks of Rituxan infusions. Side effects can include infusion reactions, severe drug rashes including mucocutaneous reactions, reactivation of latent hepatitis and other infections and rarely progressive multifocal leukoencephalopathy.  All of the patient's questions and concerns were addressed.

## 2021-10-17 DIAGNOSIS — L40.9 SCALP PSORIASIS: ICD-10-CM

## 2021-10-18 RX ORDER — FLUOCINONIDE TOPICAL SOLUTION USP, 0.05% 0.5 MG/ML
SOLUTION TOPICAL
Qty: 60 ML | Refills: 0 | Status: SHIPPED | OUTPATIENT
Start: 2021-10-18 | End: 2024-02-15

## 2021-11-20 ENCOUNTER — OUTPATIENT INFUSION SERVICES (OUTPATIENT)
Dept: ONCOLOGY | Facility: MEDICAL CENTER | Age: 46
End: 2021-11-20
Attending: PSYCHIATRY & NEUROLOGY
Payer: COMMERCIAL

## 2021-11-20 VITALS
OXYGEN SATURATION: 100 % | HEIGHT: 67 IN | SYSTOLIC BLOOD PRESSURE: 121 MMHG | DIASTOLIC BLOOD PRESSURE: 71 MMHG | RESPIRATION RATE: 18 BRPM | WEIGHT: 195.77 LBS | HEART RATE: 93 BPM | BODY MASS INDEX: 30.73 KG/M2 | TEMPERATURE: 97 F

## 2021-11-20 DIAGNOSIS — G35 MULTIPLE SCLEROSIS (HCC): ICD-10-CM

## 2021-11-20 PROCEDURE — 96375 TX/PRO/DX INJ NEW DRUG ADDON: CPT

## 2021-11-20 PROCEDURE — 700111 HCHG RX REV CODE 636 W/ 250 OVERRIDE (IP): Performed by: PSYCHIATRY & NEUROLOGY

## 2021-11-20 PROCEDURE — 96415 CHEMO IV INFUSION ADDL HR: CPT

## 2021-11-20 PROCEDURE — A9270 NON-COVERED ITEM OR SERVICE: HCPCS | Performed by: PSYCHIATRY & NEUROLOGY

## 2021-11-20 PROCEDURE — 96413 CHEMO IV INFUSION 1 HR: CPT

## 2021-11-20 PROCEDURE — 700105 HCHG RX REV CODE 258: Performed by: PSYCHIATRY & NEUROLOGY

## 2021-11-20 PROCEDURE — 700102 HCHG RX REV CODE 250 W/ 637 OVERRIDE(OP): Performed by: PSYCHIATRY & NEUROLOGY

## 2021-11-20 RX ORDER — SODIUM CHLORIDE 9 MG/ML
INJECTION, SOLUTION INTRAVENOUS CONTINUOUS
Status: DISCONTINUED | OUTPATIENT
Start: 2021-11-20 | End: 2021-11-20 | Stop reason: HOSPADM

## 2021-11-20 RX ORDER — 0.9 % SODIUM CHLORIDE 0.9 %
10 VIAL (ML) INJECTION PRN
OUTPATIENT
Start: 2022-04-26

## 2021-11-20 RX ORDER — DIPHENHYDRAMINE HYDROCHLORIDE 50 MG/ML
50 INJECTION INTRAMUSCULAR; INTRAVENOUS PRN
OUTPATIENT
Start: 2022-04-26

## 2021-11-20 RX ORDER — ACETAMINOPHEN 325 MG/1
650 TABLET ORAL ONCE
Status: COMPLETED | OUTPATIENT
Start: 2021-11-20 | End: 2021-11-20

## 2021-11-20 RX ORDER — METHYLPREDNISOLONE SODIUM SUCCINATE 125 MG/2ML
100 INJECTION, POWDER, LYOPHILIZED, FOR SOLUTION INTRAMUSCULAR; INTRAVENOUS ONCE
Status: COMPLETED | OUTPATIENT
Start: 2021-11-20 | End: 2021-11-20

## 2021-11-20 RX ORDER — 0.9 % SODIUM CHLORIDE 0.9 %
3 VIAL (ML) INJECTION PRN
OUTPATIENT
Start: 2022-04-26

## 2021-11-20 RX ORDER — 0.9 % SODIUM CHLORIDE 0.9 %
VIAL (ML) INJECTION PRN
OUTPATIENT
Start: 2022-04-26

## 2021-11-20 RX ORDER — EPINEPHRINE 1 MG/ML(1)
0.5 AMPUL (ML) INJECTION PRN
OUTPATIENT
Start: 2022-04-26

## 2021-11-20 RX ORDER — ACETAMINOPHEN 325 MG/1
650 TABLET ORAL ONCE
OUTPATIENT
Start: 2022-04-26

## 2021-11-20 RX ORDER — HEPARIN SODIUM (PORCINE) LOCK FLUSH IV SOLN 100 UNIT/ML 100 UNIT/ML
500 SOLUTION INTRAVENOUS PRN
OUTPATIENT
Start: 2022-04-26

## 2021-11-20 RX ORDER — METHYLPREDNISOLONE SODIUM SUCCINATE 125 MG/2ML
100 INJECTION, POWDER, LYOPHILIZED, FOR SOLUTION INTRAMUSCULAR; INTRAVENOUS ONCE
OUTPATIENT
Start: 2022-04-26

## 2021-11-20 RX ORDER — SODIUM CHLORIDE 9 MG/ML
INJECTION, SOLUTION INTRAVENOUS CONTINUOUS
OUTPATIENT
Start: 2022-04-26

## 2021-11-20 RX ORDER — DIPHENHYDRAMINE HCL 25 MG
25 TABLET ORAL ONCE
OUTPATIENT
Start: 2022-04-26 | End: 2022-04-26

## 2021-11-20 RX ORDER — METHYLPREDNISOLONE SODIUM SUCCINATE 125 MG/2ML
125 INJECTION, POWDER, LYOPHILIZED, FOR SOLUTION INTRAMUSCULAR; INTRAVENOUS PRN
OUTPATIENT
Start: 2022-04-26

## 2021-11-20 RX ORDER — ONDANSETRON 2 MG/ML
8 INJECTION INTRAMUSCULAR; INTRAVENOUS EVERY 4 HOURS PRN
OUTPATIENT
Start: 2022-04-26

## 2021-11-20 RX ORDER — DIPHENHYDRAMINE HCL 25 MG
25 TABLET ORAL ONCE
Status: COMPLETED | OUTPATIENT
Start: 2021-11-20 | End: 2021-11-20

## 2021-11-20 RX ADMIN — DIPHENHYDRAMINE HYDROCHLORIDE 25 MG: 25 TABLET ORAL at 08:38

## 2021-11-20 RX ADMIN — SODIUM CHLORIDE: 9 INJECTION, SOLUTION INTRAVENOUS at 08:38

## 2021-11-20 RX ADMIN — OCRELIZUMAB 600 MG: 300 INJECTION INTRAVENOUS at 09:10

## 2021-11-20 RX ADMIN — ACETAMINOPHEN 650 MG: 325 TABLET ORAL at 08:38

## 2021-11-20 RX ADMIN — METHYLPREDNISOLONE SODIUM SUCCINATE 100 MG: 125 INJECTION, POWDER, FOR SOLUTION INTRAMUSCULAR; INTRAVENOUS at 08:39

## 2021-11-20 NOTE — PROGRESS NOTES
Pt arrived to [facility] for Ocrevus infusion. POC discussed with pt and she agrees with plan. PIV established, brisk blood return noted. Pt medicated per MAR. Ocrevus titrated @subsequent rate. Pt tolerated infusion without s/s adverse reaction. PIV dc'd catheter tip intact, gauze and coban dressing applied. Pt discharged to self care, [facility]. Pt states her insurance has changed and she will get future infusions else where.

## 2021-12-12 DIAGNOSIS — G35 MULTIPLE SCLEROSIS (HCC): Primary | ICD-10-CM

## 2022-01-28 ENCOUNTER — OFFICE VISIT (OUTPATIENT)
Dept: NEUROLOGY | Facility: MEDICAL CENTER | Age: 47
End: 2022-01-28
Attending: PSYCHIATRY & NEUROLOGY

## 2022-01-28 VITALS
HEIGHT: 68 IN | OXYGEN SATURATION: 98 % | BODY MASS INDEX: 29.57 KG/M2 | WEIGHT: 195.11 LBS | HEART RATE: 92 BPM | DIASTOLIC BLOOD PRESSURE: 78 MMHG | SYSTOLIC BLOOD PRESSURE: 122 MMHG

## 2022-01-28 DIAGNOSIS — Z72.0 TOBACCO USE: Primary | ICD-10-CM

## 2022-01-28 DIAGNOSIS — G35 MULTIPLE SCLEROSIS (HCC): ICD-10-CM

## 2022-01-28 PROCEDURE — 99215 OFFICE O/P EST HI 40 MIN: CPT | Performed by: PSYCHIATRY & NEUROLOGY

## 2022-01-28 RX ORDER — NORTRIPTYLINE HYDROCHLORIDE 25 MG/1
25 CAPSULE ORAL NIGHTLY
Qty: 30 CAPSULE | Refills: 5 | Status: SHIPPED | OUTPATIENT
Start: 2022-01-28 | End: 2022-02-27

## 2022-01-28 RX ORDER — AMANTADINE HYDROCHLORIDE 100 MG/1
100 CAPSULE, GELATIN COATED ORAL 2 TIMES DAILY
Qty: 60 CAPSULE | Refills: 5 | Status: SHIPPED | OUTPATIENT
Start: 2022-01-28 | End: 2022-02-01

## 2022-01-28 ASSESSMENT — PATIENT HEALTH QUESTIONNAIRE - PHQ9: CLINICAL INTERPRETATION OF PHQ2 SCORE: 0

## 2022-01-28 NOTE — PROGRESS NOTES
"[facility]  MULTIPLE SCLEROSIS & NEUROIMMUNOLOGY  FOLLOW-UP VISIT    DISEASE SUMMARY:  Principal neurologic diagnosis: MS  Diagnosis of MS: 3/2019  Disease History:  - 2/24/2019: onset of left-sided headache and left monocular visual \"grayness\" with pain with eye movements  - 3/2019: onset of visual disturbance in both eyes; admitted to hospital and treated with high-dose steroids w/ gradual improvement to baseline  - 5/2019: started Tecfidera  - 5/1, 5/15/2021: started Ocrevus  Disease course at onset: relapsing  Current disease course: relapsing  Previous disease therapies:  - Tecfidera  Current disease therapies:  - Ocrevus  Symptomatic therapies:  - none  CSF:  - OCBs: 6  Other Testing:  - JCV Ab: positive  - anti-MOG Ab (7/8/2019): negative  MRI head:  - 12/8/2021: stable  - 11/5/2020: 1 enhancing lesion in the left temporal lobe  - 11/22/2019:   - 6/13/2019:   - 3/19/2019: typical lesions w/ enhancement  MRI cervical spine:  - 3/20/2019: no visible lesions  MRI thoracic spine:  - 4/11/2019: enhancing lesion at T10    CC: multiple sclerosis    INTERVAL HISTORY:  Daksha Senior is a 46 y.o. woman with multiple sclerosis.  I last saw her in the MS Clinic on 7/20/2021.  At that time we planned to continue ocrelizumab.  Today, she was unaccompanied, and she provided the following interval history:    Daksha tolerates Ocrevus infusions well, though she feels more fatigued than usual for about 1 week afterward.    She is not noticed any new or worsened neurologic symptoms since last visit.    Daksha continues to struggle with fatigue.  She sleeps about 8 hours nightly.   she takes amitriptyline and recently reduced the dosage from 50 mg nightly to 25 mg nightly.  This has not resulted in a significant improvement in her fatigue.    Marlee's job is extremely stressful.  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    MEDICATIONS:  Current Outpatient Medications   Medication Sig   • " amantadine (SYMMETREL) 100 MG Cap Take 1 Capsule by mouth 2 times a day for 30 days.   • nortriptyline (PAMELOR) 25 MG Cap Take 1 Capsule by mouth every evening for 30 days.   • nicotine (NICODERM) 7 MG/24HR PATCH 24 HR Place 1 Patch on the skin every 24 hours for 30 days.   • fluocinonide (LIDEX) 0.05 % external solution APPLY A THIN LAYER TO  AFFECTED AREA(S) TOPICALLY  AS DIRECTED FOR 2 WEEKS OR  LESS TO THE RESOLUTION OF  SYMPTOMS   • Ocrelizumab (OCREVUS IV) Infuse 600 mg into a venous catheter every 6 months.   • omeprazole (PRILOSEC) 40 MG delayed-release capsule TAKE 1 CAPSULE BY MOUTH  DAILY   • Probiotic Product (PROBIOTIC-10 PO) Take  by mouth.   • Tretinoin 0.05 % Lotion Apply 1 Application topically every day.   • vitamin D (CHOLECALCIFEROL) 1000 Unit (25 mcg) Tab Take 1,000 Units by mouth every day.   • therapeutic multivitamin-minerals (THERAGRAN-M) Tab Take 1 Tab by mouth every day.   • ibuprofen (MOTRIN) 200 MG Tab Take 200-600 mg by mouth every 8 hours as needed.     MEDICAL, SOCIAL, AND FAMILY HISTORY:  There is no change in the patient's ROS or PFSH from their previous visit on 7/20/2021.    REVIEW OF SYSTEMS:  A ROS was completed.  Pertinent positives and negatives were included in the HPI, above.  All other systems were reviewed and are negative.    PHYSICAL EXAM:  General/Medical:  - NAD  - hair, skin, nails, and joints were normal     Neuro:  MENTAL STATUS: awake and alert; no deficits of speech or language; oriented to person, place, and time; affect was appropriate to situation; pleasant, cooperative     CRANIAL NERVES:    II: acuity was: NT, fields: NT, pupils: NT, discs: NT    III/IV/VI: versions grossly intact    V: facial sensation: NT    VII: facial expression: NT    VIII: hearing: intact to voice    IX/X: palate: NT    XI: shoulder shrug: symmetric    XII: tongue midline     MOTOR:  - bulk: normal  - tone: NT  Upper Extremity Strength  (R/L)    NT   Elbow flexion NT   Elbow  "extension NT   Shoulder abduction NT      Lower Extremity Strength  (R/L)   Hip flexion NT   Knee extension NT   Knee flexion NT   Ankle plantarflexion NT   Ankle dorsiflexion NT     - no abnormal movements    SENSATION:  - light touch: grossly intact  - vibration (R/L, seconds): NT at the great toes  - pinprick: NT  - proprioception: NT  - Romberg: NT    COORDINATION:  - finger to nose was: NT  - finger tapping was: NT    REFLEXES:  Reflex Right Left   BR NT NT   Biceps NT NT   Triceps NT NT   Patellae NT NT   Achilles NT NT   Toes NT NT      GAIT:  - narrow base and normal     QUANTITATIVE SCORES:  Timed 25-foot walk (sec): 3.3 on 1/28/2022 (3.5 on 12/11/2020)  Assistive device: none    REVIEW OF IMAGING STUDIES:  MRI Brain:  Date: 12/6/2021  W/o and w/ contrast?: yes  Indication: \"MS\"  Comparison: 11/5/2020 and 11/22/2019  Impression:  \"Stable demyelinating disease without evidence of new plaques or enhancing lesions to suggest active inflammation. Findings satisfy Garland's MRI MS criteria for dissemination in space but not time.\"    REVIEW OF LABORATORY STUDIES:  No pertinent data since the last visit.    ASSESSMENT:  Daksha Senior is a 46 y.o. woman with MS.  She remains clinically stable on Ocrevus, which she tolerates well.  We will continue this regimen for now.  Plans/recommendations as follows:    PLAN:  Multiple Sclerosis:  - continue Ocrevus  - switch from amitrityline 25 mg nightly to nortriptyline 25 mg nightly in order to avoid sedating side effects  - encouraged smoking cessation  - apply nicotine patches daily (this might help with fatigue)  - after you stop smoking, ok to try amantadine 100 mg daily, if tolerated and sub-optimally effective, can take twice daily (morning and noon)    Follow-Up:  - Return in about 2 months (around 3/28/2022).    Signed: Fernando Flor M.D.    BILLING DOCUMENTATION:   I spent 51 minutes reviewing the medical record, interviewing and examining the patient, " "discussing my impression (see \"assessment\" above), and coordinating care.  "

## 2022-01-31 NOTE — TELEPHONE ENCOUNTER
Received request via: Pharmacy    Was the patient seen in the last year in this department? Yes    [facility]    1/28/22  [facility]     3/30/22    Does the patient have an active prescription (recently filled or refills available) for medication(s) requested? yes

## 2022-02-01 RX ORDER — AMANTADINE HYDROCHLORIDE 100 MG/1
CAPSULE, GELATIN COATED ORAL
Qty: 180 CAPSULE | Refills: 1 | Status: SHIPPED | OUTPATIENT
Start: 2022-02-01 | End: 2024-02-15

## 2022-03-30 ENCOUNTER — TELEMEDICINE (OUTPATIENT)
Dept: NEUROLOGY | Facility: MEDICAL CENTER | Age: 47
End: 2022-03-30
Attending: PSYCHIATRY & NEUROLOGY

## 2022-03-30 VITALS — HEIGHT: 68 IN | BODY MASS INDEX: 29.55 KG/M2 | WEIGHT: 195 LBS

## 2022-03-30 DIAGNOSIS — G35 MULTIPLE SCLEROSIS (HCC): ICD-10-CM

## 2022-03-30 DIAGNOSIS — Z72.0 TOBACCO USE: Primary | ICD-10-CM

## 2022-03-30 PROCEDURE — 99215 OFFICE O/P EST HI 40 MIN: CPT | Mod: 95 | Performed by: PSYCHIATRY & NEUROLOGY

## 2022-03-30 RX ORDER — MODAFINIL 100 MG/1
100 TABLET ORAL DAILY
Qty: 30 TABLET | Refills: 0 | Status: SHIPPED | OUTPATIENT
Start: 2022-03-30 | End: 2022-05-24

## 2022-03-30 RX ORDER — NICOTINE 21 MG/24HR
1 PATCH, TRANSDERMAL 24 HOURS TRANSDERMAL EVERY 24 HOURS
Qty: 30 PATCH | Refills: 3 | Status: SHIPPED | OUTPATIENT
Start: 2022-03-30 | End: 2022-04-29

## 2022-03-30 RX ORDER — NORTRIPTYLINE HYDROCHLORIDE 50 MG/1
50 CAPSULE ORAL NIGHTLY
Qty: 90 CAPSULE | Refills: 3 | Status: SHIPPED | OUTPATIENT
Start: 2022-03-30 | End: 2023-03-31

## 2022-03-30 NOTE — PROGRESS NOTES
"[facility]  MULTIPLE SCLEROSIS & NEUROIMMUNOLOGY  FOLLOW-UP VISIT    This evaluation was conducted via Zoom using secure and encrypted videoconferencing technology. The patient was in their home in the [facility].    The patient's identity was confirmed and verbal consent was obtained for this virtual visit.    DISEASE SUMMARY:  Principal neurologic diagnosis: MS  Diagnosis of MS: 3/2019  Disease History:  - 2/24/2019: onset of left-sided headache and left monocular visual \"grayness\" with pain with eye movements  - 3/2019: onset of visual disturbance in both eyes; admitted to hospital and treated with high-dose steroids w/ gradual improvement to baseline  - 5/2019: started Tecfidera  - 5/1, 5/15/2021: started Ocrevus  Disease course at onset: relapsing  Current disease course: relapsing  Previous disease therapies:  - Tecfidera  Current disease therapies:  - Ocrevus (most recent dose: 11/20/2021)  Symptomatic therapies:  - amantadine: possibly helpful for fatigue  -   CSF:  - OCBs: 6  Other Testing:  - JCV Ab: positive  - anti-MOG Ab (7/8/2019): negative  MRI head:  - 12/8/2021: stable  - 11/5/2020: 1 enhancing lesion in the left temporal lobe  - 11/22/2019:   - 6/13/2019:   - 3/19/2019: typical lesions w/ enhancement  MRI cervical spine:  - 3/20/2019: no visible lesions  MRI thoracic spine:  - 4/11/2019: enhancing lesion at T10    CC: multiple sclerosis    INTERVAL HISTORY:  Daksha Senior is a 46 y.o. woman with multiple sclerosis.  I last saw her in the [facility] on 1/28/2021.  At that time we planned to continue ocrelizumab and switch from amitriptyline to nortriptyline.  Today, I followed up with her via Zoom, and she provided the following interval history:    Daksha continues on ocrelizumab.  Her most recent dose was administered on 11/20/2021.  She tolerated this well without any side effects.    There have not been any new or worsened neurologic symptoms since last visit.  She does not suspect " that she has had a relapse.    Daksha switch from amitriptyline to nortriptyline for treatment of mood.  Her fatigue has improved somewhat.  She also takes amantadine but with questionable efficacy.  She sleeps 8 hours per night.  She would like to try a different medication for fatigue.    She continues to smoke.  The nicotine patches were somewhat helpful, and she requests a stronger dosage.    MEDICATIONS:  Current Outpatient Medications   Medication Sig   • nortriptyline (PAMELOR) 50 MG capsule Take 1 Capsule by mouth every evening for 90 days.   • modafinil (PROVIGIL) 100 MG Tab Take 1 Tablet by mouth every day for 30 days.   • nicotine (NICODERM) 14 MG/24HR PATCH 24 HR Place 1 Patch on the skin every 24 hours for 30 days.   • amantadine (SYMMETREL) 100 MG Cap TAKE 1 CAPSULE BY MOUTH TWICE DAILY   • fluocinonide (LIDEX) 0.05 % external solution APPLY A THIN LAYER TO  AFFECTED AREA(S) TOPICALLY  AS DIRECTED FOR 2 WEEKS OR  LESS TO THE RESOLUTION OF  SYMPTOMS   • Ocrelizumab (OCREVUS IV) Infuse 600 mg into a venous catheter every 6 months.   • omeprazole (PRILOSEC) 40 MG delayed-release capsule TAKE 1 CAPSULE BY MOUTH  DAILY   • Probiotic Product (PROBIOTIC-10 PO) Take  by mouth.   • Tretinoin 0.05 % Lotion Apply 1 Application topically every day.   • vitamin D (CHOLECALCIFEROL) 1000 Unit (25 mcg) Tab Take 1,000 Units by mouth every day.   • therapeutic multivitamin-minerals (THERAGRAN-M) Tab Take 1 Tab by mouth every day.   • ibuprofen (MOTRIN) 200 MG Tab Take 200-600 mg by mouth every 8 hours as needed.     MEDICAL, SOCIAL, AND FAMILY HISTORY:  There is no change in the patient's ROS or PFSH from their previous visit on 7/20/2021.    REVIEW OF SYSTEMS:  A ROS was completed.  Pertinent positives and negatives were included in the HPI, above.  All other systems were reviewed and are negative.    PHYSICAL EXAM:  General/Medical:  - NAD  - hair, skin, nails, and joints were normal     Neuro:  MENTAL STATUS: awake  and alert; no deficits of speech or language; oriented to person, place, and time; affect was appropriate to situation; pleasant, cooperative     CRANIAL NERVES:    II: acuity was: NT, fields: NT, pupils: NT, discs: NT    III/IV/VI: versions grossly intact    V: facial sensation: NT    VII: facial expression: NT    VIII: hearing: intact to voice    IX/X: palate: NT    XI: shoulder shrug: symmetric    XII: tongue midline     MOTOR:  - bulk: normal  - tone: NT  Upper Extremity Strength  (R/L)    NT   Elbow flexion NT   Elbow extension NT   Shoulder abduction NT      Lower Extremity Strength  (R/L)   Hip flexion NT   Knee extension NT   Knee flexion NT   Ankle plantarflexion NT   Ankle dorsiflexion NT     - no abnormal movements    SENSATION:  - light touch: grossly intact  - vibration (R/L, seconds): NT at the great toes  - pinprick: NT  - proprioception: NT  - Romberg: NT    COORDINATION:  - finger to nose was: NT  - finger tapping was: NT    REFLEXES:  Reflex Right Left   BR NT NT   Biceps NT NT   Triceps NT NT   Patellae NT NT   Achilles NT NT   Toes NT NT      GAIT:  - narrow base and normal     QUANTITATIVE SCORES:  Timed 25-foot walk (sec): NT today (3.3 on 1/28/2022, 3.5 on 12/11/2020)  Assistive device: none    REVIEW OF IMAGING STUDIES:  No additional images since the last visit.    REVIEW OF LABORATORY STUDIES:  No additional data since the last visit.    ASSESSMENT:  Daksha Senior is a 46 y.o. woman with MS.  She remains clinically stable on Ocrevus, which she tolerates well.  We will continue this regimen for now.  Plans/recommendations as follows:    PLAN:  Multiple Sclerosis:  - continue Ocrevus  - increase nortriptyline from 25 mg nightly to 50 mg nightly for mood  - pause amantadine  - trial of modafinil for fatigue  - encouraged smoking cessation  - nicotine patches 14 mg dosage    Follow-Up:  - Return in about 3 months (around 6/30/2022).    Signed: FINA Tyler  "DOCUMENTATION:   I spent 43 minutes reviewing the medical record, interviewing and examining the patient, discussing my impression (see \"assessment\" above), and coordinating care.  "

## 2022-05-24 DIAGNOSIS — G35 MULTIPLE SCLEROSIS (HCC): ICD-10-CM

## 2022-05-24 RX ORDER — MODAFINIL 100 MG/1
TABLET ORAL
Qty: 90 TABLET | Refills: 0 | Status: SHIPPED | OUTPATIENT
Start: 2022-05-24 | End: 2022-09-01

## 2022-05-24 NOTE — TELEPHONE ENCOUNTER
Received request via: Pharmacy    Was the patient seen in the last year in this department? Yes    LV: 3/30/22  FV: none    Does the patient have an active prescription (recently filled or refills available) for medication(s) requested? No

## 2022-08-31 DIAGNOSIS — G35 MULTIPLE SCLEROSIS (HCC): ICD-10-CM

## 2022-09-01 RX ORDER — MODAFINIL 100 MG/1
TABLET ORAL
Qty: 90 TABLET | Refills: 2 | Status: SHIPPED | OUTPATIENT
Start: 2022-09-01 | End: 2022-11-30

## 2022-12-05 DIAGNOSIS — G35 MULTIPLE SCLEROSIS (HCC): Primary | ICD-10-CM

## 2023-03-16 ENCOUNTER — TELEPHONE (OUTPATIENT)
Dept: NEUROLOGY | Facility: MEDICAL CENTER | Age: 48
End: 2023-03-16
Payer: COMMERCIAL

## 2023-03-16 DIAGNOSIS — G35 MULTIPLE SCLEROSIS (HCC): ICD-10-CM

## 2023-03-16 RX ORDER — MODAFINIL 100 MG/1
100 TABLET ORAL
Qty: 30 TABLET | Refills: 5 | Status: SHIPPED | OUTPATIENT
Start: 2023-03-16 | End: 2023-04-15

## 2023-03-16 NOTE — TELEPHONE ENCOUNTER
Received request via: Pharmacy    Was the patient seen in the last year in this department? Yes    Does the patient have an active prescription (recently filled or refills available) for medication(s) requested? No    Does the patient have [facility] and need 100 day supply (blood pressure, diabetes and cholesterol meds only)? Medication is not for cholesterol, blood pressure or diabetes.     Please write new script for modafinil (PROVIGIL) 100 MG Tab.

## 2023-03-16 NOTE — TELEPHONE ENCOUNTER
Patient needs follow up with Dr. Flor. Please call to scheduled at next available opening. Thank you!  -HUEY Rucker.

## 2023-03-31 DIAGNOSIS — G35 MULTIPLE SCLEROSIS (HCC): ICD-10-CM

## 2023-03-31 RX ORDER — NORTRIPTYLINE HYDROCHLORIDE 50 MG/1
50 CAPSULE ORAL EVERY EVENING
Qty: 90 CAPSULE | Refills: 3 | Status: SHIPPED | OUTPATIENT
Start: 2023-03-31 | End: 2023-06-29

## 2023-03-31 NOTE — TELEPHONE ENCOUNTER
Received request via: Pharmacy    Was the patient seen in the last year in this department? No last seen 3/30/2022    Does the patient have an active prescription (recently filled or refills available) for medication(s) requested? Yes.   Does the patient have nursing home Plus and need 100 day supply (blood pressure, diabetes and cholesterol meds only)? Patient does not have SCP

## 2023-04-26 ENCOUNTER — OFFICE VISIT (OUTPATIENT)
Dept: NEUROLOGY | Facility: MEDICAL CENTER | Age: 48
End: 2023-04-26
Attending: PSYCHIATRY & NEUROLOGY
Payer: COMMERCIAL

## 2023-04-26 VITALS
BODY MASS INDEX: 30.1 KG/M2 | OXYGEN SATURATION: 100 % | SYSTOLIC BLOOD PRESSURE: 112 MMHG | WEIGHT: 198.63 LBS | HEART RATE: 85 BPM | TEMPERATURE: 98.1 F | DIASTOLIC BLOOD PRESSURE: 78 MMHG | HEIGHT: 68 IN | RESPIRATION RATE: 16 BRPM

## 2023-04-26 DIAGNOSIS — G35 MULTIPLE SCLEROSIS (HCC): Primary | ICD-10-CM

## 2023-04-26 DIAGNOSIS — Z00.00 HEALTHCARE MAINTENANCE: ICD-10-CM

## 2023-04-26 PROCEDURE — 99214 OFFICE O/P EST MOD 30 MIN: CPT | Performed by: PSYCHIATRY & NEUROLOGY

## 2023-04-26 PROCEDURE — 99212 OFFICE O/P EST SF 10 MIN: CPT | Performed by: PSYCHIATRY & NEUROLOGY

## 2023-04-26 RX ORDER — PANTOPRAZOLE SODIUM 40 MG/1
1 TABLET, DELAYED RELEASE ORAL DAILY
COMMUNITY
Start: 2023-04-08

## 2023-04-26 RX ORDER — MODAFINIL 100 MG/1
100 TABLET ORAL DAILY
COMMUNITY
End: 2023-08-01

## 2023-04-26 ASSESSMENT — PATIENT HEALTH QUESTIONNAIRE - PHQ9: CLINICAL INTERPRETATION OF PHQ2 SCORE: 0

## 2023-04-26 NOTE — PROGRESS NOTES
"[facility]  MULTIPLE SCLEROSIS & NEUROIMMUNOLOGY  FOLLOW-UP VISIT    DISEASE SUMMARY:  Principal neurologic diagnosis: MS  Diagnosis of MS: 3/2019  Disease History:  - 2/24/2019: onset of left-sided headache and left monocular visual \"grayness\" with pain with eye movements  - 3/2019: onset of visual disturbance in both eyes; admitted to hospital and treated with high-dose steroids w/ gradual improvement to baseline  - 5/2019: started Tecfidera  - 5/1, 5/15/2021: started Ocrevus  Disease course at onset: relapsing  Current disease course: relapsing  Previous disease therapies:  - Tecfidera  Current disease therapies:  - Ocrevus (most recent dose: 11/11/2022)  Symptomatic therapies:  - amantadine: possibly helpful for fatigue  - modafinil: more helpful for fatigue than amantadine  CSF:  - OCBs: 6  Other Testing:  - JCV Ab: positive  - anti-MOG Ab (7/8/2019): negative  MRI head:  - 12/8/2021: stable  - 11/5/2020: 1 enhancing lesion in the left temporal lobe  - 11/22/2019:   - 6/13/2019:   - 3/19/2019: typical lesions w/ enhancement  MRI cervical spine:  - 3/20/2019: no visible lesions  MRI thoracic spine:  - 4/11/2019: enhancing lesion at T10    CC: multiple sclerosis    INTERVAL HISTORY:  Daksha Senior is a 47 y.o. woman with multiple sclerosis.  I last saw her via Zoom on 3/30/2022.  At that time we planned to continue ocrelizumab and increase nortriptyline to 50 mg/nightly.  We also planned to try modafinil.  Today, she was unaccompanied, and she provided the following interval history:    Daksha continues on ocrelizumab.  Her most recent dose was administered on 11/11/2022.  She tolerated this well without any side effects.    There have not been any new or worsened neurologic symptoms since last visit.  She does not suspect that she has had a relapse.    MEDICATIONS:  Current Outpatient Medications   Medication Sig    pantoprazole (PROTONIX) 40 MG Tablet Delayed Response Take 1 Tablet by mouth every day.    " modafinil (PROVIGIL) 100 MG Tab Take 100 mg by mouth every day.    nortriptyline (PAMELOR) 50 MG capsule Take 1 Capsule by mouth every evening for 90 days.    amantadine (SYMMETREL) 100 MG Cap TAKE 1 CAPSULE BY MOUTH TWICE DAILY    Ocrelizumab (OCREVUS IV) Infuse 600 mg into a venous catheter every 6 months.    Probiotic Product (PROBIOTIC-10 PO) Take  by mouth.    vitamin D (CHOLECALCIFEROL) 1000 Unit (25 mcg) Tab Take 1,000 Units by mouth every day.    therapeutic multivitamin-minerals (THERAGRAN-M) Tab Take 1 Tab by mouth every day.    ibuprofen (MOTRIN) 200 MG Tab Take 200-600 mg by mouth every 8 hours as needed.    fluocinonide (LIDEX) 0.05 % external solution APPLY A THIN LAYER TO  AFFECTED AREA(S) TOPICALLY  AS DIRECTED FOR 2 WEEKS OR  LESS TO THE RESOLUTION OF  SYMPTOMS (Patient not taking: Reported on 4/26/2023)    omeprazole (PRILOSEC) 40 MG delayed-release capsule TAKE 1 CAPSULE BY MOUTH  DAILY (Patient not taking: Reported on 4/26/2023)    Tretinoin 0.05 % Lotion Apply 1 Application topically every day. (Patient not taking: Reported on 4/26/2023)     MEDICAL, SOCIAL, AND FAMILY HISTORY:  There is no change in the patient's ROS or PFSH from their previous visit on 3/30/2022.    REVIEW OF SYSTEMS:  A ROS was completed.  Pertinent positives and negatives were included in the HPI, above.  All other systems were reviewed and are negative.    PHYSICAL EXAM:  General/Medical:  - NAD  - hair, skin, nails, and joints were normal     Neuro:  MENTAL STATUS: awake and alert; no deficits of speech or language; oriented to person, place, and time; affect was appropriate to situation; pleasant, cooperative     CRANIAL NERVES:    II: acuity was: NT, fields: NT, pupils: NT, discs: NT    III/IV/VI: versions grossly intact    V: facial sensation: NT    VII: facial expression: NT    VIII: hearing: intact to voice    IX/X: palate: NT    XI: shoulder shrug: symmetric    XII: tongue midline     MOTOR:  - bulk: normal  - tone:  "NT  Upper Extremity Strength  (R/L)    NT   Elbow flexion NT   Elbow extension NT   Shoulder abduction NT      Lower Extremity Strength  (R/L)   Hip flexion NT   Knee extension NT   Knee flexion NT   Ankle plantarflexion NT   Ankle dorsiflexion NT     - no abnormal movements    SENSATION:  - light touch: grossly intact  - vibration (R/L, seconds): NT at the great toes  - pinprick: NT  - proprioception: NT  - Romberg: NT    COORDINATION:  - finger to nose was: NT  - finger tapping was: NT    REFLEXES:  Reflex Right Left   BR NT NT   Biceps NT NT   Triceps NT NT   Patellae NT NT   Achilles NT NT   Toes NT NT      GAIT:  - narrow base and normal     QUANTITATIVE SCORES:  Timed 25-foot walk (sec): 3.4 on 4/26/2023 (3.3 on 1/28/2022, 3.5 on 12/11/2020)  Assistive device: none    REVIEW OF IMAGING STUDIES:  No additional images since the last visit.    REVIEW OF LABORATORY STUDIES:  No additional data since the last visit.    ASSESSMENT:  Daksha Senior is a 47 y.o. woman with MS.  She remains clinically stable on Ocrevus, which she tolerates well.  We will continue this regimen for now.  Plans/recommendations as follows:    PLAN:  Multiple Sclerosis:  - continue Ocrevus  - continue modafinil  - repeat MRI brain w/o and w/ contrast  - immunoglobulin levels  - lymphocyte subsets    Follow-Up:  - Return in about 1 year (around 4/26/2024).    Signed: Fernando Flor M.D.    BILLING DOCUMENTATION:   I spent 34 minutes reviewing the medical record, interviewing and examining the patient, discussing my impression (see \"assessment\" above), and coordinating care.  "

## 2023-05-26 ENCOUNTER — APPOINTMENT (RX ONLY)
Dept: URBAN - METROPOLITAN AREA CLINIC 4 | Facility: CLINIC | Age: 48
Setting detail: DERMATOLOGY
End: 2023-05-26

## 2023-05-26 DIAGNOSIS — L82.1 OTHER SEBORRHEIC KERATOSIS: ICD-10-CM

## 2023-05-26 DIAGNOSIS — D18.0 HEMANGIOMA: ICD-10-CM

## 2023-05-26 DIAGNOSIS — Z71.89 OTHER SPECIFIED COUNSELING: ICD-10-CM

## 2023-05-26 DIAGNOSIS — L81.4 OTHER MELANIN HYPERPIGMENTATION: ICD-10-CM

## 2023-05-26 DIAGNOSIS — D22 MELANOCYTIC NEVI: ICD-10-CM

## 2023-05-26 PROBLEM — D18.01 HEMANGIOMA OF SKIN AND SUBCUTANEOUS TISSUE: Status: ACTIVE | Noted: 2023-05-26

## 2023-05-26 PROBLEM — D22.5 MELANOCYTIC NEVI OF TRUNK: Status: ACTIVE | Noted: 2023-05-26

## 2023-05-26 PROBLEM — D48.5 NEOPLASM OF UNCERTAIN BEHAVIOR OF SKIN: Status: ACTIVE | Noted: 2023-05-26

## 2023-05-26 PROCEDURE — ? SUNSCREEN RECOMMENDATIONS

## 2023-05-26 PROCEDURE — ? BIOPSY BY SHAVE METHOD

## 2023-05-26 PROCEDURE — 11102 TANGNTL BX SKIN SINGLE LES: CPT

## 2023-05-26 PROCEDURE — 99203 OFFICE O/P NEW LOW 30 MIN: CPT | Mod: 25

## 2023-05-26 PROCEDURE — ? COUNSELING

## 2023-05-26 ASSESSMENT — LOCATION SIMPLE DESCRIPTION DERM
LOCATION SIMPLE: LEFT FOREARM
LOCATION SIMPLE: RIGHT UPPER BACK
LOCATION SIMPLE: RIGHT FOREARM
LOCATION SIMPLE: ABDOMEN
LOCATION SIMPLE: RIGHT UPPER ARM
LOCATION SIMPLE: UPPER BACK

## 2023-05-26 ASSESSMENT — LOCATION ZONE DERM
LOCATION ZONE: TRUNK
LOCATION ZONE: ARM

## 2023-05-26 ASSESSMENT — LOCATION DETAILED DESCRIPTION DERM
LOCATION DETAILED: INFERIOR THORACIC SPINE
LOCATION DETAILED: LEFT PROXIMAL DORSAL FOREARM
LOCATION DETAILED: EPIGASTRIC SKIN
LOCATION DETAILED: RIGHT ANTERIOR PROXIMAL UPPER ARM
LOCATION DETAILED: RIGHT INFERIOR UPPER BACK
LOCATION DETAILED: RIGHT PROXIMAL DORSAL FOREARM

## 2023-05-26 NOTE — PROCEDURE: MIPS QUALITY
Quality 110: Preventive Care And Screening: Influenza Immunization: Influenza Immunization Administered during Influenza season
Detail Level: Detailed
Quality 226: Preventive Care And Screening: Tobacco Use: Screening And Cessation Intervention: Patient screened for tobacco use, is a smoker AND did not receive Cessation Counseling during measurement period or in the six months prior to the measurement period

## 2023-05-26 NOTE — HPI: FULL BODY SKIN EXAMINATION
How Severe Are Your Spot(S)?: mild
What Type Of Note Output Would You Prefer (Optional)?: Standard Output
What Is The Reason For Today's Visit?: Full Body Skin Examination
What Is The Reason For Today's Visit? (Being Monitored For X): concerning skin lesions on an annual basis
Additional History: Pt has not noticed any new or changing moles. No tender or bleeding spots\\n

## 2023-06-06 DIAGNOSIS — G35 MULTIPLE SCLEROSIS (HCC): Primary | ICD-10-CM

## 2023-11-01 ENCOUNTER — APPOINTMENT (OUTPATIENT)
Dept: NEUROLOGY | Facility: MEDICAL CENTER | Age: 48
End: 2023-11-01
Attending: PSYCHIATRY & NEUROLOGY

## 2023-11-20 DIAGNOSIS — G35 MULTIPLE SCLEROSIS (HCC): ICD-10-CM

## 2023-11-20 RX ORDER — MODAFINIL 100 MG/1
100 TABLET ORAL DAILY
Qty: 30 TABLET | Refills: 3 | Status: SHIPPED | OUTPATIENT
Start: 2023-11-20 | End: 2024-03-19

## 2023-11-20 NOTE — TELEPHONE ENCOUNTER
Received request via: Pharmacy    Was the patient seen in the last year in this department? Yes   Date of last office visit 4/26/23    Per last Neurology Office Visit, when was the date of next follow up visit set for?                            Date of office visit follow up request 4/26/24      Does the patient have an upcoming appointment? No   If yes, when?     Does the patient have an active prescription (recently filled or refills available) for medication(s) requested? No    Does the patient have nursing home Plus and need 100 day supply (blood pressure, diabetes and cholesterol meds only)? Medication is not for cholesterol, blood pressure or diabetes

## 2023-12-21 ENCOUNTER — APPOINTMENT (OUTPATIENT)
Dept: NEUROLOGY | Facility: MEDICAL CENTER | Age: 48
End: 2023-12-21
Attending: PSYCHIATRY & NEUROLOGY

## 2024-02-15 ENCOUNTER — OFFICE VISIT (OUTPATIENT)
Dept: NEUROLOGY | Facility: MEDICAL CENTER | Age: 49
End: 2024-02-15
Attending: PSYCHIATRY & NEUROLOGY

## 2024-02-15 ENCOUNTER — TELEPHONE (OUTPATIENT)
Dept: PHARMACY | Facility: MEDICAL CENTER | Age: 49
End: 2024-02-15

## 2024-02-15 VITALS
TEMPERATURE: 97.5 F | WEIGHT: 205.69 LBS | HEART RATE: 95 BPM | DIASTOLIC BLOOD PRESSURE: 82 MMHG | BODY MASS INDEX: 31.17 KG/M2 | HEIGHT: 68 IN | SYSTOLIC BLOOD PRESSURE: 126 MMHG | OXYGEN SATURATION: 99 % | RESPIRATION RATE: 16 BRPM

## 2024-02-15 DIAGNOSIS — G35 MULTIPLE SCLEROSIS (HCC): Primary | ICD-10-CM

## 2024-02-15 DIAGNOSIS — G43.109 MIGRAINE WITH AURA AND WITHOUT STATUS MIGRAINOSUS, NOT INTRACTABLE: ICD-10-CM

## 2024-02-15 PROCEDURE — 3074F SYST BP LT 130 MM HG: CPT | Performed by: PSYCHIATRY & NEUROLOGY

## 2024-02-15 PROCEDURE — 99212 OFFICE O/P EST SF 10 MIN: CPT | Performed by: PSYCHIATRY & NEUROLOGY

## 2024-02-15 PROCEDURE — 3079F DIAST BP 80-89 MM HG: CPT | Performed by: PSYCHIATRY & NEUROLOGY

## 2024-02-15 PROCEDURE — 99214 OFFICE O/P EST MOD 30 MIN: CPT | Performed by: PSYCHIATRY & NEUROLOGY

## 2024-02-15 RX ORDER — RIZATRIPTAN BENZOATE 10 MG/1
TABLET, ORALLY DISINTEGRATING ORAL
Qty: 11 TABLET | Refills: 11 | Status: SHIPPED | OUTPATIENT
Start: 2024-02-15

## 2024-02-15 ASSESSMENT — PATIENT HEALTH QUESTIONNAIRE - PHQ9: CLINICAL INTERPRETATION OF PHQ2 SCORE: 0

## 2024-02-15 NOTE — TELEPHONE ENCOUNTER
Received Refill PA request via [facility]  for rizatriptan (MAXALT-MLT) 10 MG disintegrating tablet . (Quantity:10, Day Supply: 30  30)     Insurance: [employer]   Member ID:  [id number]  BIN: [id number]  PCN: [id number]  Group: [id number]     Ran Test claim via [facility] & medication Pays for a $15.00 copay. Will outreach to patient to offer specialty pharmacy services and or release to preferred pharmacy. MAX 10 TABLETS PER MONTH

## 2024-02-15 NOTE — PROGRESS NOTES
"[facility]  MULTIPLE SCLEROSIS & NEUROIMMUNOLOGY  FOLLOW-UP VISIT    DISEASE SUMMARY:  Principal neurologic diagnosis: MS  Diagnosis of MS: 3/2019  Disease History:  - 2/24/2019: onset of left-sided headache and left monocular visual \"grayness\" with pain with eye movements  - 3/2019: onset of visual disturbance in both eyes; admitted to hospital and treated with high-dose steroids w/ gradual improvement to baseline  - 5/2019: started Tecfidera  - 5/1, 5/15/2021: started Ocrevus  Disease course at onset: relapsing  Current disease course: relapsing  Previous disease therapies:  - Tecfidera  Current disease therapies:  - Ocrevus (most recent dose: 11/11/2023)  Symptomatic therapies:  - amantadine: possibly helpful for fatigue  - modafinil: more helpful for fatigue than amantadine  CSF:  - OCBs: 6  Other Testing:  - JCV Ab: positive  - anti-MOG Ab (7/8/2019): negative  MRI head:  - 7/27/2023: \"stable... without evidence of new lesions or enhancing lesions...\" (compared to 12/6/2021 and 11/5/2020)  - 12/8/2021: stable  - 11/5/2020: 1 enhancing lesion in the left temporal lobe  - 11/22/2019:   - 6/13/2019:   - 3/19/2019: typical lesions w/ enhancement  MRI cervical spine:  - 3/20/2019: no visible lesions  MRI thoracic spine:  - 4/11/2019: enhancing lesion at T10    CC: multiple sclerosis    INTERVAL HISTORY:  Daksha Senior is a 48 y.o. woman with multiple sclerosis.  I last saw her in the clinic on 4/26/2023.  At that time we planned to continue ocrelizumab and modafinil.  Today, she was unaccompanied, and she provided the following interval history:    Daksha continues on ocrelizumab.  Her most recent dose was administered on 11/11/2022.  She tolerated this well without any side effects.    There have not been any new or worsened neurologic symptoms since last visit.  She does not suspect that she has had a relapse.    The following is a summary of headache symptoms, presented in my standard format:    Family " "History:   Age at onset (years): childhood  Location: one side of head, left retro-orbital  Radiation:   Frequency: 1-2/week  Duration: can last \"a couple of days\"  Headache Days/Month: 6/10  Quality: \"pressure\"  Intensity: 3-5/10  Aura: none  Photophobia/Phonophobia/Nausea/Vomiting: yes/yes/no/no  Provoked by Physical Activity?:   Triggers: work-related stressors, co-workers  Associated Symptoms: clumsiness (the \"dropsies\")  Autonomic Signs (such as ptosis, miosis, conjunctival injection, rhinorrhea, increased lacrimation):   Head Trauma: none  Association with Menses:   ED Visits:   Hospitalizations:   Missed Work Days ():   Sleep (hours/night): 7-8, good quality  Caffeine Intake: 2-3 cups/day  Hydration: well-hydrated  Nutrition: likes to bake  Exercise: walking pad for standing desk (hard to type and walk), hikes on the weekends, pickleball  Analgesic Overuse:     Current Medication Regimen:  -     Medications Tried: Response  Preventive:  - amitriptyline: ?  - nortriptyline: ?    Rescue:  - acetaminophen: sometimes doesn't touch the pan  - ibuprofen:     Medications Not Tried:  -       MEDICATIONS:  Current Outpatient Medications   Medication Sig    rizatriptan (MAXALT-MLT) 10 MG disintegrating tablet Take 10 mg at the onset of aura/HA; may re-dose x1 after 2 hrs if HA persists; MDD: 20 mg; do not use >2 days/week.    modafinil (PROVIGIL) 100 MG Tab Take 1 Tablet by mouth every day for 120 days.    pantoprazole (PROTONIX) 40 MG Tablet Delayed Response Take 1 Tablet by mouth every day.    Ocrelizumab (OCREVUS IV) Infuse 600 mg into a venous catheter every 6 months.    Probiotic Product (PROBIOTIC-10 PO) Take  by mouth.    vitamin D (CHOLECALCIFEROL) 1000 Unit (25 mcg) Tab Take 1,000 Units by mouth every day.    therapeutic multivitamin-minerals (THERAGRAN-M) Tab Take 1 Tab by mouth every day.    ibuprofen (MOTRIN) 200 MG Tab Take 200-600 mg by mouth every 8 hours as needed.     MEDICAL, SOCIAL, AND FAMILY " HISTORY:  There is no change in the patient's ROS or PFSH from their previous visit on 4/26/2023.    REVIEW OF SYSTEMS:  A ROS was completed.  Pertinent positives and negatives were included in the HPI, above.  All other systems were reviewed and are negative.    PHYSICAL EXAM:  General/Medical:  - NAD  - hair, skin, nails, and joints were normal     Neuro:  MENTAL STATUS: awake and alert; no deficits of speech or language; oriented to person, place, and time; affect was appropriate to situation; pleasant, cooperative     CRANIAL NERVES:    II: acuity was: NT, fields: NT, pupils: NT, discs: NT    III/IV/VI: versions grossly intact    V: facial sensation: NT    VII: facial expression: NT    VIII: hearing: intact to voice    IX/X: palate: NT    XI: shoulder shrug: symmetric    XII: tongue midline     MOTOR:  - bulk: normal  - tone: NT  Upper Extremity Strength  (R/L)    NT   Elbow flexion NT   Elbow extension NT   Shoulder abduction NT      Lower Extremity Strength  (R/L)   Hip flexion NT   Knee extension NT   Knee flexion NT   Ankle plantarflexion NT   Ankle dorsiflexion NT     - no abnormal movements    SENSATION:  - light touch: grossly intact  - vibration (R/L, seconds): NT at the great toes  - pinprick: NT  - proprioception: NT  - Romberg: NT    COORDINATION:  - finger to nose was: NT  - finger tapping was: NT    REFLEXES:  Reflex Right Left   BR NT NT   Biceps NT NT   Triceps NT NT   Patellae NT NT   Achilles NT NT   Toes NT NT      GAIT:  - narrow base and normal     QUANTITATIVE SCORES:  Timed 25-foot walk (sec): NT today (3.4 on 4/26/2023, 3.3 on 1/28/2022, 3.5 on 12/11/2020)  Assistive device: none    REVIEW OF IMAGING STUDIES:  I have summarized interval data above.    REVIEW OF LABORATORY STUDIES:  No additional data since the last visit.    ASSESSMENT:  Daksha Senior is a 48 y.o. woman with MS and likely episodic migraine w/o aura.  She remains clinically stable on Ocrevus, which she tolerates  well.  We will continue this regimen for now.  Plans/recommendations as follows:    PLAN:  Multiple Sclerosis:  - continue Ocrevus  - continue modafinil  - immunoglobulin levels  - lymphocyte subsets    Episodic Migraine w/o Aura:  Prevention:  - try supplementing:  - magnesium: 400-600 mg once or 200-300 mg twice daily  - riboflavin (vitamin B2): 400 mg once daily  - coenzyme Q10: 300 mg daily  - get 7-9 hours of sleep per night; can try supplementing melatonin 2-10 mg, 2-3 hours before bedtime  - drink plenty of fluids (urine should be nearly clear)  - avoid excessive caffeine intake (no more than 2 servings per day and nothing in the afternoon)  - eat regular meals (don't skip meals)  - get moderate exercise (even just a 20 minute walk daily)    Rescue:  - rizatriptan 10 mg: take this at the onset of aura or headache pain; may re-dose x1 after 2 hours if headache persists; do not use more than 2 days/week  - do not use analgesics (e.g., ibuprofen, acetaminophen) more than 2 days per week in order to avoid analgesic rebound headaches    - keep a headache log    Follow-Up:  - Return in about 3 months (around 5/15/2024).    Signed: Fernando Flor M.D.

## 2024-02-16 ENCOUNTER — TELEPHONE (OUTPATIENT)
Dept: PHARMACY | Facility: MEDICAL CENTER | Age: 49
End: 2024-02-16

## 2024-02-16 NOTE — TELEPHONE ENCOUNTER
Back to back call Attempts to contact patient at [phone] to discuss [facility] and services/benefits offered. No answer, left voicemail.    Ernestina Hyman  Rx Coordinator   [phone]

## 2024-02-16 NOTE — TELEPHONE ENCOUNTER
Spoke with patient and offered services-she declined and would like medications filled with current pharmacy.    Ernestina Hyman  Rx Coordinator   [phone]

## 2024-04-02 DIAGNOSIS — G35 MULTIPLE SCLEROSIS (HCC): ICD-10-CM

## 2024-04-02 RX ORDER — NORTRIPTYLINE HYDROCHLORIDE 50 MG/1
50 CAPSULE ORAL EVERY EVENING
Qty: 90 CAPSULE | Refills: 3 | Status: SHIPPED | OUTPATIENT
Start: 2024-04-02

## 2024-04-02 NOTE — TELEPHONE ENCOUNTER
Received request via: Pharmacy    Medication Name/Dosage  nortriptyline (PAMELOR) 50 MG capsule     When was medication last prescribed 3/20/22     How many refills were previously provided 3    How many Refills does he patient have left from last prescription 0    Was the patient seen in the last year in this department? Yes   Date of last office visit 2/15/24     Per last Neurology Office Visit, when was the date of next follow up visit set for?                            Date of office visit follow up request 5/15/24    Does the patient have an upcoming appointment? No   If yes, when              If no, schedule appointment     Does the patient have [facility] and need 100 day supply (blood pressure, diabetes and cholesterol meds only)? Medication is not for cholesterol, blood pressure or diabetes

## 2024-05-03 DIAGNOSIS — G35 MULTIPLE SCLEROSIS (HCC): ICD-10-CM

## 2024-05-03 NOTE — TELEPHONE ENCOUNTER
Received request via: Pharmacy    Medication Name/Dosage  MODAFINIL  100MG  TAB     When was medication last prescribed 11/20/23    How many refills were previously provided 3    How many Refills does he patient have left from last prescription 0    Was the patient seen in the last year in this department? Yes   Date of last office visit 2/15/24     Per last Neurology Office Visit, when was the date of next follow up visit set for?                            Date of office visit follow up request 2/15/24    Does the patient have an upcoming appointment? No   If yes, when              If no, schedule appointment     Does the patient have [facility] and need 100 day supply (blood pressure, diabetes and cholesterol meds only)? Medication is not for cholesterol, blood pressure or diabetes

## 2024-05-06 ENCOUNTER — TELEPHONE (OUTPATIENT)
Dept: PHARMACY | Facility: MEDICAL CENTER | Age: 49
End: 2024-05-06

## 2024-05-06 RX ORDER — MODAFINIL 100 MG/1
100 TABLET ORAL DAILY
Qty: 30 TABLET | Refills: 5 | Status: SHIPPED | OUTPATIENT
Start: 2024-05-06 | End: 2024-11-02

## 2024-05-06 NOTE — TELEPHONE ENCOUNTER
modafinil (PROVIGIL) 100 MG Tab    Received Renewal PA request via [facility]  for Provigil. (Quantity:30, Day Supply:30)     Insurance: [employer]  Member ID:  [id number]  BIN: [id number]  PCN: [id number]  Group: [id number]     Ran Test claim via [place] [facility] medication Pays for a $17.07 copay. Will outreach to patient to offer specialty pharmacy services and or release to preferred pharmacy

## 2025-03-13 ENCOUNTER — TELEPHONE (OUTPATIENT)
Dept: NEUROLOGY | Facility: MEDICAL CENTER | Age: 50
End: 2025-03-13

## 2025-04-03 ENCOUNTER — OFFICE VISIT (OUTPATIENT)
Dept: NEUROLOGY | Facility: MEDICAL CENTER | Age: 50
End: 2025-04-03
Attending: PSYCHIATRY & NEUROLOGY

## 2025-04-03 VITALS
SYSTOLIC BLOOD PRESSURE: 112 MMHG | DIASTOLIC BLOOD PRESSURE: 68 MMHG | RESPIRATION RATE: 16 BRPM | HEART RATE: 105 BPM | HEIGHT: 68 IN | BODY MASS INDEX: 26.22 KG/M2 | TEMPERATURE: 97.7 F | WEIGHT: 173 LBS | OXYGEN SATURATION: 98 %

## 2025-04-03 DIAGNOSIS — G35 MULTIPLE SCLEROSIS (HCC): ICD-10-CM

## 2025-04-03 DIAGNOSIS — G43.109 MIGRAINE WITH AURA AND WITHOUT STATUS MIGRAINOSUS, NOT INTRACTABLE: ICD-10-CM

## 2025-04-03 PROCEDURE — 99212 OFFICE O/P EST SF 10 MIN: CPT | Performed by: PSYCHIATRY & NEUROLOGY

## 2025-04-03 PROCEDURE — 3074F SYST BP LT 130 MM HG: CPT | Performed by: PSYCHIATRY & NEUROLOGY

## 2025-04-03 PROCEDURE — 99214 OFFICE O/P EST MOD 30 MIN: CPT | Performed by: PSYCHIATRY & NEUROLOGY

## 2025-04-03 PROCEDURE — 3078F DIAST BP <80 MM HG: CPT | Performed by: PSYCHIATRY & NEUROLOGY

## 2025-04-03 RX ORDER — COVID-19 ANTIGEN TEST
KIT MISCELLANEOUS
COMMUNITY

## 2025-04-03 RX ORDER — NORTRIPTYLINE HYDROCHLORIDE 10 MG/1
CAPSULE ORAL
Qty: 147 CAPSULE | Refills: 0 | Status: SHIPPED | OUTPATIENT
Start: 2025-04-03 | End: 2025-05-15

## 2025-04-03 RX ORDER — FREMANEZUMAB-VFRM 225 MG/1.5ML
1.5 INJECTION SUBCUTANEOUS
Qty: 1.68 ML | Refills: 11 | Status: SHIPPED | OUTPATIENT
Start: 2025-04-03 | End: 2026-04-03

## 2025-04-03 ASSESSMENT — PATIENT HEALTH QUESTIONNAIRE - PHQ9
SUM OF ALL RESPONSES TO PHQ QUESTIONS 1-9: 2
CLINICAL INTERPRETATION OF PHQ2 SCORE: 1
5. POOR APPETITE OR OVEREATING: 0 - NOT AT ALL

## 2025-04-03 NOTE — PROGRESS NOTES
"[facility]  MULTIPLE SCLEROSIS & NEUROIMMUNOLOGY  FOLLOW-UP VISIT    DISEASE SUMMARY:  Principal neurologic diagnosis: MS  Diagnosis of MS: 3/2019  Disease History:  - 2/24/2019: onset of left-sided headache and left monocular visual \"grayness\" with pain with eye movements  - 3/2019: onset of visual disturbance in both eyes; admitted to hospital and treated with high-dose steroids w/ gradual improvement to baseline  - 5/2019: started Tecfidera  - 5/1, 5/15/2021: started Ocrevus  Disease course at onset: relapsing  Current disease course: relapsing  Previous disease therapies:  - Tecfidera  Current disease therapies:  - Ocrevus  Symptomatic therapies:  - amantadine: possibly helpful for fatigue  - modafinil: more helpful for fatigue than amantadine  CSF:  - OCBs: 6  Other Testing:  - JCV Ab: positive  - anti-MOG Ab (7/8/2019): negative  MRI head:  - 7/27/2023: \"stable... without evidence of new lesions or enhancing lesions...\" (compared to 12/6/2021 and 11/5/2020)  - 12/8/2021: stable  - 11/5/2020: 1 enhancing lesion in the left temporal lobe  - 11/22/2019:   - 6/13/2019:   - 3/19/2019: typical lesions w/ enhancement  MRI cervical spine:  - 3/20/2019: no visible lesions  MRI thoracic spine:  - 4/11/2019: enhancing lesion at T10    CC: multiple sclerosis    INTERVAL HISTORY:  Daksha Senior is a 49 y.o. woman with multiple sclerosis.  I last saw her in the clinic on 2/15/2024.  At that time we planned to continue ocrelizumab and modafinil.  Today, she was unaccompanied, and she provided the following interval history:    Daksha continues on ocrelizumab.  She tolerated this well without any side effects.    There have not been any new or worsened neurologic symptoms since last visit.  She does not suspect that she has had a relapse.    The following is a summary of headache symptoms, presented in my standard format:    Family History: sisters w/ migraines  Age at onset (years): childhood  Location: one side of " "head, left retro-orbital  Radiation: none  Frequency: baseline: 1-2/week, lately: 2/week  Duration: baseline: can last \"a couple of days\", lately: hours  Headache Days/Month: baseline: 6-10, lately: 7-14  Quality: \"pressure\"  Intensity: baseline: 3-5/10, lately: 3-4/10  Aura: none  Photophobia/Phonophobia/Nausea/Vomiting: yes/yes/yes/no  Provoked by Physical Activity?: none  Triggers: work-related stressors, co-workers, the news  Associated Symptoms: clumsiness (the \"dropsies\")  Autonomic Signs (such as ptosis, miosis, conjunctival injection, rhinorrhea, increased lacrimation):   Head Trauma: none  Association with Menses:   ED Visits: none  Hospitalizations: none  Missed Work Days (): none, though she would like to go home  Sleep (hours/night): 7-8, good quality  Caffeine Intake: 2-3 cups/day  Hydration: well-hydrated  Nutrition: likes to bake  Exercise: walking pad for standing desk (hard to type and walk), hikes on the weekends, pickleball  Analgesic Overuse:     Current Medication Regimen:  - Aleve: helpful  - nortriptyline: recently increased to 75 mg nightly is ineffective  - rizatriptan: inconsistently effective    Medications Tried: Response  Preventive:  - amitriptyline: ineffective  - topiramate: prescribed for weight loss    Rescue:  - acetaminophen: sometimes doesn't touch the pan  - ibuprofen:     Medications Not Tried:  -       MEDICATIONS:  Current Outpatient Medications   Medication Sig    nortriptyline (PAMELOR) 50 MG capsule TAKE 1 CAPSULE BY MOUTH EVERY EVENING    rizatriptan (MAXALT-MLT) 10 MG disintegrating tablet Take 10 mg at the onset of aura/HA; may re-dose x1 after 2 hrs if HA persists; MDD: 20 mg; do not use >2 days/week.    pantoprazole (PROTONIX) 40 MG Tablet Delayed Response Take 1 Tablet by mouth every day.    Ocrelizumab (OCREVUS IV) Infuse 600 mg into a venous catheter every 6 months.    Probiotic Product (PROBIOTIC-10 PO) Take  by mouth.    vitamin D (CHOLECALCIFEROL) 1000 Unit (25 " mcg) Tab Take 1,000 Units by mouth every day.    therapeutic multivitamin-minerals (THERAGRAN-M) Tab Take 1 Tab by mouth every day.    ibuprofen (MOTRIN) 200 MG Tab Take 200-600 mg by mouth every 8 hours as needed.     MEDICAL, SOCIAL, AND FAMILY HISTORY:  There is no change in the patient's ROS or PFSH from their previous visit on 2/15/2024.    REVIEW OF SYSTEMS:  A ROS was completed.  Pertinent positives and negatives were included in the HPI, above.  All other systems were reviewed and are negative.    PHYSICAL EXAM:  General/Medical:  - NAD  - hair, skin, nails, and joints were normal     Neuro:  MENTAL STATUS: awake and alert; no deficits of speech or language; oriented to person, place, and time; affect was appropriate to situation; pleasant, cooperative     CRANIAL NERVES:    II: acuity was: NT, fields: NT, pupils: NT, discs: NT    III/IV/VI: versions grossly intact    V: facial sensation: NT    VII: facial expression: NT    VIII: hearing: intact to voice    IX/X: palate: NT    XI: shoulder shrug: symmetric    XII: tongue midline     MOTOR:  - bulk: normal  - tone: NT  Upper Extremity Strength  (R/L)    NT   Elbow flexion NT   Elbow extension NT   Shoulder abduction NT      Lower Extremity Strength  (R/L)   Hip flexion NT   Knee extension NT   Knee flexion NT   Ankle plantarflexion NT   Ankle dorsiflexion NT     - no abnormal movements    SENSATION:  - light touch: grossly intact  - vibration (R/L, seconds): NT at the great toes  - pinprick: NT  - proprioception: NT  - Romberg: NT    COORDINATION:  - finger to nose was: NT  - finger tapping was: NT    REFLEXES:  Reflex Right Left   BR NT NT   Biceps NT NT   Triceps NT NT   Patellae NT NT   Achilles NT NT   Toes NT NT      GAIT:  - narrow base and normal     QUANTITATIVE SCORES:  Timed 25-foot walk (sec): NT today (3.4 on 4/26/2023, 3.3 on 1/28/2022, 3.5 on 12/11/2020)  Assistive device: none    REVIEW OF IMAGING STUDIES:  I have summarized interval data  above.    REVIEW OF LABORATORY STUDIES:  No additional data since the last visit.    ASSESSMENT:  Daksha Senior is a 49 y.o. woman with MS and likely episodic migraine w/o aura.  She remains clinically stable on Ocrevus, which she tolerates well.  We will continue this regimen for now.  Plans/recommendations as follows:    PLAN:  Multiple Sclerosis:  - continue Ocrevus for now  - could use [employer] as an exit strategy  - immunoglobulin levels  - lymphocyte subsets    Episodic Migraine w/o Aura:  Prevention:  - start an injectable anti-CGRP MAb  - get 7-9 hours of sleep per night; can try supplementing melatonin 2-10 mg, 2-3 hours before bedtime  - drink plenty of fluids (urine should be nearly clear)  - avoid excessive caffeine intake (no more than 2 servings per day and nothing in the afternoon)  - eat regular meals (don't skip meals)  - get moderate exercise (even just a 20 minute walk daily)    Rescue:  - rizatriptan 10 mg: take this at the onset of aura or headache pain; may re-dose x1 after 2 hours if headache persists; do not use more than 2 days/week  - do not use analgesics (e.g., ibuprofen, acetaminophen) more than 2 days per week in order to avoid analgesic rebound headaches    - keep a headache log    Follow-Up:  - Return in about 3 months (around 7/3/2025).    Signed: Fernando Flor M.D.

## 2025-04-04 ENCOUNTER — TELEPHONE (OUTPATIENT)
Dept: PHARMACY | Facility: MEDICAL CENTER | Age: 50
End: 2025-04-04

## 2025-04-04 NOTE — TELEPHONE ENCOUNTER
Received New Start PA request via [facility]  for (AJOVY) 225 MG/1.5ML Solution Auto-injector. (Quantity:1.5, Day Supply:30)     Insurance: [employer]   Member ID:  [id number]  BIN: [id number]  PCN: [id number]  Group: [id number]     Ran Test claim via [place] [facility] medication Rejects stating prior authorization is required.

## 2025-04-09 NOTE — TELEPHONE ENCOUNTER
Prior Authorization for     (AJOVY) 225 MG/1.5ML Solution Auto-injector    (Quantity: 1.5, Days: 30) has been submitted via Cover My Meds: Key ([id number]) sent manually.    Insurance: [employer]    Will follow up in 24-48 business hours.

## 2025-07-02 ENCOUNTER — APPOINTMENT (OUTPATIENT)
Dept: NEUROLOGY | Facility: MEDICAL CENTER | Age: 50
End: 2025-07-02
Attending: PSYCHIATRY & NEUROLOGY